# Patient Record
Sex: FEMALE | Race: WHITE | NOT HISPANIC OR LATINO | Employment: UNEMPLOYED | URBAN - METROPOLITAN AREA
[De-identification: names, ages, dates, MRNs, and addresses within clinical notes are randomized per-mention and may not be internally consistent; named-entity substitution may affect disease eponyms.]

---

## 2024-05-22 ENCOUNTER — APPOINTMENT (OUTPATIENT)
Dept: GENERAL RADIOLOGY | Facility: HOSPITAL | Age: 54
End: 2024-05-22
Payer: COMMERCIAL

## 2024-05-22 ENCOUNTER — APPOINTMENT (OUTPATIENT)
Dept: CT IMAGING | Facility: HOSPITAL | Age: 54
End: 2024-05-22
Payer: COMMERCIAL

## 2024-05-22 ENCOUNTER — HOSPITAL ENCOUNTER (INPATIENT)
Facility: HOSPITAL | Age: 54
LOS: 2 days | Discharge: HOME | End: 2024-05-24
Attending: STUDENT IN AN ORGANIZED HEALTH CARE EDUCATION/TRAINING PROGRAM | Admitting: INTERNAL MEDICINE
Payer: COMMERCIAL

## 2024-05-22 ENCOUNTER — APPOINTMENT (OUTPATIENT)
Dept: MRI IMAGING | Facility: HOSPITAL | Age: 54
End: 2024-05-22
Payer: COMMERCIAL

## 2024-05-22 ENCOUNTER — APPOINTMENT (OUTPATIENT)
Dept: CARDIOLOGY | Facility: HOSPITAL | Age: 54
End: 2024-05-22
Payer: COMMERCIAL

## 2024-05-22 DIAGNOSIS — R29.90 STROKE-LIKE SYMPTOMS: Primary | ICD-10-CM

## 2024-05-22 PROBLEM — G45.9 TIA (TRANSIENT ISCHEMIC ATTACK): Status: RESOLVED | Noted: 2024-05-22 | Resolved: 2024-05-22

## 2024-05-22 PROBLEM — I63.9 ACUTE CVA (CEREBROVASCULAR ACCIDENT): Status: ACTIVE | Noted: 2024-05-22

## 2024-05-22 PROBLEM — G45.9 TIA (TRANSIENT ISCHEMIC ATTACK): Status: ACTIVE | Noted: 2024-05-22

## 2024-05-22 PROBLEM — I63.9 CVA (CEREBRAL VASCULAR ACCIDENT): Status: ACTIVE | Noted: 2024-05-22

## 2024-05-22 LAB
ABO GROUP BLD: NORMAL
ALBUMIN SERPL-MCNC: 4.8 G/DL (ref 3.5–5.2)
ALBUMIN/GLOB SERPL: 1.5 G/DL
ALP SERPL-CCNC: 97 U/L (ref 39–117)
ALT SERPL W P-5'-P-CCNC: 27 U/L (ref 1–33)
ANION GAP SERPL CALCULATED.3IONS-SCNC: 13.1 MMOL/L (ref 5–15)
AORTIC DIMENSIONLESS INDEX: 0.8 (DI)
APTT PPP: 31.2 SECONDS (ref 22.7–35.4)
ASCENDING AORTA: 2.7 CM
AST SERPL-CCNC: 22 U/L (ref 1–32)
BASOPHILS # BLD AUTO: 0.04 10*3/MM3 (ref 0–0.2)
BASOPHILS NFR BLD AUTO: 0.6 % (ref 0–1.5)
BH CV ECHO MEAS - ACS: 2.08 CM
BH CV ECHO MEAS - AO MAX PG: 10 MMHG
BH CV ECHO MEAS - AO MEAN PG: 4.5 MMHG
BH CV ECHO MEAS - AO ROOT DIAM: 3.2 CM
BH CV ECHO MEAS - AO V2 MAX: 158 CM/SEC
BH CV ECHO MEAS - AO V2 VTI: 27.4 CM
BH CV ECHO MEAS - AVA(I,D): 2 CM2
BH CV ECHO MEAS - EDV(CUBED): 108.8 ML
BH CV ECHO MEAS - EDV(MOD-SP2): 58 ML
BH CV ECHO MEAS - EDV(MOD-SP4): 57 ML
BH CV ECHO MEAS - EF(MOD-BP): 67.7 %
BH CV ECHO MEAS - EF(MOD-SP2): 62.1 %
BH CV ECHO MEAS - EF(MOD-SP4): 70.2 %
BH CV ECHO MEAS - ESV(CUBED): 26 ML
BH CV ECHO MEAS - ESV(MOD-SP2): 22 ML
BH CV ECHO MEAS - ESV(MOD-SP4): 17 ML
BH CV ECHO MEAS - FS: 38 %
BH CV ECHO MEAS - IVS/LVPW: 1.07 CM
BH CV ECHO MEAS - IVSD: 0.88 CM
BH CV ECHO MEAS - LAT PEAK E' VEL: 8.3 CM/SEC
BH CV ECHO MEAS - LV MASS(C)D: 136.9 GRAMS
BH CV ECHO MEAS - LV MAX PG: 6.9 MMHG
BH CV ECHO MEAS - LV MEAN PG: 3.4 MMHG
BH CV ECHO MEAS - LV V1 MAX: 131.2 CM/SEC
BH CV ECHO MEAS - LV V1 VTI: 22.8 CM
BH CV ECHO MEAS - LVIDD: 4.8 CM
BH CV ECHO MEAS - LVIDS: 3 CM
BH CV ECHO MEAS - LVOT AREA: 2.4 CM2
BH CV ECHO MEAS - LVOT DIAM: 1.75 CM
BH CV ECHO MEAS - LVPWD: 0.83 CM
BH CV ECHO MEAS - MED PEAK E' VEL: 7.6 CM/SEC
BH CV ECHO MEAS - MV A DUR: 0.1 SEC
BH CV ECHO MEAS - MV A MAX VEL: 97.3 CM/SEC
BH CV ECHO MEAS - MV DEC SLOPE: 322.2 CM/SEC2
BH CV ECHO MEAS - MV DEC TIME: 0.3 SEC
BH CV ECHO MEAS - MV E MAX VEL: 82.3 CM/SEC
BH CV ECHO MEAS - MV E/A: 0.85
BH CV ECHO MEAS - MV MAX PG: 4.4 MMHG
BH CV ECHO MEAS - MV MEAN PG: 1.91 MMHG
BH CV ECHO MEAS - MV P1/2T: 89.9 MSEC
BH CV ECHO MEAS - MV V2 VTI: 27.8 CM
BH CV ECHO MEAS - MVA(P1/2T): 2.45 CM2
BH CV ECHO MEAS - MVA(VTI): 1.97 CM2
BH CV ECHO MEAS - PA V2 MAX: 95 CM/SEC
BH CV ECHO MEAS - PULM A REVS DUR: 0.13 SEC
BH CV ECHO MEAS - PULM A REVS VEL: 29.4 CM/SEC
BH CV ECHO MEAS - PULM DIAS VEL: 41.8 CM/SEC
BH CV ECHO MEAS - PULM S/D: 1
BH CV ECHO MEAS - PULM SYS VEL: 41.8 CM/SEC
BH CV ECHO MEAS - RAP SYSTOLE: 3 MMHG
BH CV ECHO MEAS - RVSP: 17 MMHG
BH CV ECHO MEAS - SV(LVOT): 54.7 ML
BH CV ECHO MEAS - SV(MOD-SP2): 36 ML
BH CV ECHO MEAS - SV(MOD-SP4): 40 ML
BH CV ECHO MEAS - TAPSE (>1.6): 2.7 CM
BH CV ECHO MEAS - TR MAX PG: 14.2 MMHG
BH CV ECHO MEAS - TR MAX VEL: 188.7 CM/SEC
BH CV ECHO MEASUREMENTS AVERAGE E/E' RATIO: 10.35
BH CV XLRA - RV BASE: 2.5 CM
BH CV XLRA - RV LENGTH: 7.1 CM
BH CV XLRA - RV MID: 2.6 CM
BH CV XLRA - TDI S': 13.8 CM/SEC
BILIRUB SERPL-MCNC: 0.2 MG/DL (ref 0–1.2)
BLD GP AB SCN SERPL QL: NEGATIVE
BUN SERPL-MCNC: 19 MG/DL (ref 6–20)
BUN/CREAT SERPL: 21.3 (ref 7–25)
CALCIUM SPEC-SCNC: 9.5 MG/DL (ref 8.6–10.5)
CHLORIDE SERPL-SCNC: 103 MMOL/L (ref 98–107)
CHOLEST SERPL-MCNC: 250 MG/DL (ref 0–200)
CO2 SERPL-SCNC: 22.9 MMOL/L (ref 22–29)
CREAT SERPL-MCNC: 0.89 MG/DL (ref 0.57–1)
DEPRECATED RDW RBC AUTO: 40.4 FL (ref 37–54)
EGFRCR SERPLBLD CKD-EPI 2021: 77.6 ML/MIN/1.73
EOSINOPHIL # BLD AUTO: 0.1 10*3/MM3 (ref 0–0.4)
EOSINOPHIL NFR BLD AUTO: 1.5 % (ref 0.3–6.2)
ERYTHROCYTE [DISTWIDTH] IN BLOOD BY AUTOMATED COUNT: 13.2 % (ref 12.3–15.4)
FOLATE SERPL-MCNC: 7.63 NG/ML (ref 4.78–24.2)
GLOBULIN UR ELPH-MCNC: 3.2 GM/DL
GLUCOSE SERPL-MCNC: 101 MG/DL (ref 65–99)
HBA1C MFR BLD: 5.8 % (ref 4.8–5.6)
HCT VFR BLD AUTO: 40.5 % (ref 34–46.6)
HDLC SERPL-MCNC: 60 MG/DL (ref 40–60)
HGB BLD-MCNC: 13.5 G/DL (ref 12–15.9)
HOLD SPECIMEN: NORMAL
HOLD SPECIMEN: NORMAL
IMM GRANULOCYTES # BLD AUTO: 0.02 10*3/MM3 (ref 0–0.05)
IMM GRANULOCYTES NFR BLD AUTO: 0.3 % (ref 0–0.5)
INR PPP: 0.88 (ref 0.9–1.1)
LDLC SERPL CALC-MCNC: 177 MG/DL (ref 0–100)
LDLC/HDLC SERPL: 2.9 {RATIO}
LEFT ATRIUM VOLUME INDEX: 9.2 ML/M2
LYMPHOCYTES # BLD AUTO: 1.79 10*3/MM3 (ref 0.7–3.1)
LYMPHOCYTES NFR BLD AUTO: 26.3 % (ref 19.6–45.3)
MCH RBC QN AUTO: 27.8 PG (ref 26.6–33)
MCHC RBC AUTO-ENTMCNC: 33.3 G/DL (ref 31.5–35.7)
MCV RBC AUTO: 83.5 FL (ref 79–97)
MONOCYTES # BLD AUTO: 0.55 10*3/MM3 (ref 0.1–0.9)
MONOCYTES NFR BLD AUTO: 8.1 % (ref 5–12)
NEUTROPHILS NFR BLD AUTO: 4.3 10*3/MM3 (ref 1.7–7)
NEUTROPHILS NFR BLD AUTO: 63.2 % (ref 42.7–76)
NRBC BLD AUTO-RTO: 0 /100 WBC (ref 0–0.2)
PLATELET # BLD AUTO: 300 10*3/MM3 (ref 140–450)
PMV BLD AUTO: 9.2 FL (ref 6–12)
POTASSIUM SERPL-SCNC: 4.2 MMOL/L (ref 3.5–5.2)
PROT SERPL-MCNC: 8 G/DL (ref 6–8.5)
PROTHROMBIN TIME: 12.2 SECONDS (ref 11.7–14.2)
QT INTERVAL: 388 MS
QTC INTERVAL: 419 MS
RBC # BLD AUTO: 4.85 10*6/MM3 (ref 3.77–5.28)
RH BLD: POSITIVE
SINUS: 3 CM
SODIUM SERPL-SCNC: 139 MMOL/L (ref 136–145)
STJ: 2.7 CM
T&S EXPIRATION DATE: NORMAL
TRIGL SERPL-MCNC: 79 MG/DL (ref 0–150)
TROPONIN T SERPL HS-MCNC: <6 NG/L
TSH SERPL DL<=0.05 MIU/L-ACNC: 4.1 UIU/ML (ref 0.27–4.2)
VIT B12 BLD-MCNC: 445 PG/ML (ref 211–946)
VLDLC SERPL-MCNC: 13 MG/DL (ref 5–40)
WBC NRBC COR # BLD AUTO: 6.8 10*3/MM3 (ref 3.4–10.8)
WHOLE BLOOD HOLD COAG: NORMAL
WHOLE BLOOD HOLD SPECIMEN: NORMAL

## 2024-05-22 PROCEDURE — 99222 1ST HOSP IP/OBS MODERATE 55: CPT

## 2024-05-22 PROCEDURE — 71045 X-RAY EXAM CHEST 1 VIEW: CPT

## 2024-05-22 PROCEDURE — 70553 MRI BRAIN STEM W/O & W/DYE: CPT

## 2024-05-22 PROCEDURE — 93306 TTE W/DOPPLER COMPLETE: CPT | Performed by: INTERNAL MEDICINE

## 2024-05-22 PROCEDURE — 85730 THROMBOPLASTIN TIME PARTIAL: CPT | Performed by: STUDENT IN AN ORGANIZED HEALTH CARE EDUCATION/TRAINING PROGRAM

## 2024-05-22 PROCEDURE — 25010000002 MIDAZOLAM PER 1 MG: Performed by: STUDENT IN AN ORGANIZED HEALTH CARE EDUCATION/TRAINING PROGRAM

## 2024-05-22 PROCEDURE — 83036 HEMOGLOBIN GLYCOSYLATED A1C: CPT | Performed by: EMERGENCY MEDICINE

## 2024-05-22 PROCEDURE — 0 GADOBENATE DIMEGLUMINE 529 MG/ML SOLUTION: Performed by: EMERGENCY MEDICINE

## 2024-05-22 PROCEDURE — 25510000001 IOPAMIDOL PER 1 ML: Performed by: EMERGENCY MEDICINE

## 2024-05-22 PROCEDURE — A9577 INJ MULTIHANCE: HCPCS | Performed by: EMERGENCY MEDICINE

## 2024-05-22 PROCEDURE — 93306 TTE W/DOPPLER COMPLETE: CPT

## 2024-05-22 PROCEDURE — 85610 PROTHROMBIN TIME: CPT | Performed by: STUDENT IN AN ORGANIZED HEALTH CARE EDUCATION/TRAINING PROGRAM

## 2024-05-22 PROCEDURE — G0378 HOSPITAL OBSERVATION PER HR: HCPCS

## 2024-05-22 PROCEDURE — 70496 CT ANGIOGRAPHY HEAD: CPT

## 2024-05-22 PROCEDURE — 99291 CRITICAL CARE FIRST HOUR: CPT

## 2024-05-22 PROCEDURE — 96374 THER/PROPH/DIAG INJ IV PUSH: CPT

## 2024-05-22 PROCEDURE — 86900 BLOOD TYPING SEROLOGIC ABO: CPT | Performed by: STUDENT IN AN ORGANIZED HEALTH CARE EDUCATION/TRAINING PROGRAM

## 2024-05-22 PROCEDURE — 84484 ASSAY OF TROPONIN QUANT: CPT | Performed by: STUDENT IN AN ORGANIZED HEALTH CARE EDUCATION/TRAINING PROGRAM

## 2024-05-22 PROCEDURE — 80061 LIPID PANEL: CPT | Performed by: EMERGENCY MEDICINE

## 2024-05-22 PROCEDURE — 72156 MRI NECK SPINE W/O & W/DYE: CPT

## 2024-05-22 PROCEDURE — 80050 GENERAL HEALTH PANEL: CPT | Performed by: STUDENT IN AN ORGANIZED HEALTH CARE EDUCATION/TRAINING PROGRAM

## 2024-05-22 PROCEDURE — 86850 RBC ANTIBODY SCREEN: CPT | Performed by: STUDENT IN AN ORGANIZED HEALTH CARE EDUCATION/TRAINING PROGRAM

## 2024-05-22 PROCEDURE — 70498 CT ANGIOGRAPHY NECK: CPT

## 2024-05-22 PROCEDURE — 93010 ELECTROCARDIOGRAM REPORT: CPT | Performed by: INTERNAL MEDICINE

## 2024-05-22 PROCEDURE — 93005 ELECTROCARDIOGRAM TRACING: CPT | Performed by: STUDENT IN AN ORGANIZED HEALTH CARE EDUCATION/TRAINING PROGRAM

## 2024-05-22 PROCEDURE — 70450 CT HEAD/BRAIN W/O DYE: CPT

## 2024-05-22 PROCEDURE — 82746 ASSAY OF FOLIC ACID SERUM: CPT

## 2024-05-22 PROCEDURE — 82607 VITAMIN B-12: CPT

## 2024-05-22 PROCEDURE — 86901 BLOOD TYPING SEROLOGIC RH(D): CPT | Performed by: STUDENT IN AN ORGANIZED HEALTH CARE EDUCATION/TRAINING PROGRAM

## 2024-05-22 RX ORDER — ASPIRIN 300 MG/1
300 SUPPOSITORY RECTAL DAILY
Status: DISCONTINUED | OUTPATIENT
Start: 2024-05-22 | End: 2024-05-24

## 2024-05-22 RX ORDER — SODIUM CHLORIDE 0.9 % (FLUSH) 0.9 %
10 SYRINGE (ML) INJECTION AS NEEDED
Status: DISCONTINUED | OUTPATIENT
Start: 2024-05-22 | End: 2024-05-24 | Stop reason: HOSPADM

## 2024-05-22 RX ORDER — ASPIRIN 325 MG
325 TABLET ORAL DAILY
Status: DISCONTINUED | OUTPATIENT
Start: 2024-05-22 | End: 2024-05-24

## 2024-05-22 RX ORDER — MIDAZOLAM HYDROCHLORIDE 1 MG/ML
1 INJECTION INTRAMUSCULAR; INTRAVENOUS EVERY 4 HOURS PRN
Status: DISCONTINUED | OUTPATIENT
Start: 2024-05-22 | End: 2024-05-24 | Stop reason: HOSPADM

## 2024-05-22 RX ORDER — SODIUM CHLORIDE 9 MG/ML
40 INJECTION, SOLUTION INTRAVENOUS AS NEEDED
Status: DISCONTINUED | OUTPATIENT
Start: 2024-05-22 | End: 2024-05-24 | Stop reason: HOSPADM

## 2024-05-22 RX ORDER — ATORVASTATIN CALCIUM 80 MG/1
80 TABLET, FILM COATED ORAL NIGHTLY
Status: DISCONTINUED | OUTPATIENT
Start: 2024-05-22 | End: 2024-05-24 | Stop reason: HOSPADM

## 2024-05-22 RX ORDER — ONDANSETRON 2 MG/ML
4 INJECTION INTRAMUSCULAR; INTRAVENOUS EVERY 6 HOURS PRN
Status: DISCONTINUED | OUTPATIENT
Start: 2024-05-22 | End: 2024-05-24 | Stop reason: HOSPADM

## 2024-05-22 RX ORDER — SODIUM CHLORIDE 0.9 % (FLUSH) 0.9 %
10 SYRINGE (ML) INJECTION EVERY 12 HOURS SCHEDULED
Status: DISCONTINUED | OUTPATIENT
Start: 2024-05-22 | End: 2024-05-24 | Stop reason: HOSPADM

## 2024-05-22 RX ORDER — ATORVASTATIN CALCIUM 20 MG/1
40 TABLET, FILM COATED ORAL NIGHTLY
Status: DISCONTINUED | OUTPATIENT
Start: 2024-05-22 | End: 2024-05-22

## 2024-05-22 RX ADMIN — IOPAMIDOL 95 ML: 755 INJECTION, SOLUTION INTRAVENOUS at 16:45

## 2024-05-22 RX ADMIN — MIDAZOLAM 1 MG: 1 INJECTION INTRAMUSCULAR; INTRAVENOUS at 10:18

## 2024-05-22 RX ADMIN — Medication 10 ML: at 16:51

## 2024-05-22 RX ADMIN — ASPIRIN 325 MG: 325 TABLET ORAL at 16:50

## 2024-05-22 RX ADMIN — Medication 10 ML: at 20:40

## 2024-05-22 RX ADMIN — ATORVASTATIN CALCIUM 80 MG: 80 TABLET, FILM COATED ORAL at 20:40

## 2024-05-22 RX ADMIN — GADOBENATE DIMEGLUMINE 20 ML: 529 INJECTION, SOLUTION INTRAVENOUS at 11:36

## 2024-05-22 NOTE — CONSULTS
Neurology Consult Note    Consult Date: 5/22/2024    Referring MD: Lex Garcia MD    Reason for Consult I have been asked to see the patient in neurological consultation to render advice and opinion regarding right hand and right foot weakness.    Linda Castano is a 53 y.o. female with no significant PMH and family history of father with stroke presents to the ED with right hand and right foot weakness since 6 am this morning. Patient states she was up washing her hands and she tried to step with her right foot towards the shower and said if felt heavy. She also noticed after showering that when she brushed her hair her right hand also felt heavy. Patient states that symptoms are gradually improving more so in her foot than her hand. She denies issues walking or imbalance. She did not appreciate any more proximal weakness. She denies numbness/tingling, speech or visual deficit. She denies dizziness. Patient admits to neck pain and states she has been told she has degenerative changes in her neck before. She also states she has intermittent numbness and tingling in both hands in more noticeable in her first 3 fingers. She does not have any shooting pain or numbness when turning head, coughing, sneezing or bearing down. She does not complain of urinary/bowel incontinence or saddle anesthesia. She denies recent head trauma or illness, or previous history of stroke or heart attack, or heart arrhythmia. BP on arrival was 175/93 mmHg and pulse was 78. Blood glucose was 101. LDL is 177. She is on no medications on arrival and states she has not seen a PCP since 2020.    Past Medical/Surgical Hx:  History reviewed. No pertinent past medical history.  History reviewed. No pertinent surgical history.    Medications On Admission  (Not in a hospital admission)      Allergies:  Allergies   Allergen Reactions    Sulfa Antibiotics Hives       Social Hx:  Social History     Socioeconomic History    Marital status:   "  Tobacco Use    Smoking status: Never    Smokeless tobacco: Never   Vaping Use    Vaping status: Never Used   Substance and Sexual Activity    Alcohol use: Yes     Comment: occasionally, 1x a month    Drug use: Not Currently    Sexual activity: Defer       Family Hx:  Family History   Problem Relation Age of Onset    Hypertension Mother     Stroke Father     Hypertension Father     Hyperlipidemia Father        Exam    BP (!) 157/111   Pulse 66   Temp 97.4 °F (36.3 °C)   Resp 18   Ht 157.5 cm (62\")   Wt 99.2 kg (218 lb 9.6 oz)   SpO2 99%   BMI 39.98 kg/m²   gen: NAD, vitals reviewed  MS: oriented x3, recent/remote memory intact, normal attention/concentration, language intact, no neglect, normal fund of knowledge  CN: visual acuity grossly normal, visual fields full, PERRL, EOMI, facial sensation equal, no facial droop, hearing symmetric, palate elevates symmetrically, shoulder shrug equal, tongue midline  Motor: 5/5 throughout upper and lower extremities, subjective weakness in right hand and foot, normal tone  Sensation: intact to cold temperature and vibration throughout, negative tinel's, positive phalen's on the right   Reflexes: 2+ bilateral brachioradialis, biceps, 0 triceps, 1+ patellar, 1+ ankle, plantars flat in right and downgoing in left, child's slightly positive, no clonus  Coordination: no dysmetria with finger to nose bilaterally  Gait: no ataxia, normal station    DATA:    Lab Results   Component Value Date    GLUCOSE 101 (H) 05/22/2024    CALCIUM 9.5 05/22/2024     05/22/2024    K 4.2 05/22/2024    CO2 22.9 05/22/2024     05/22/2024    BUN 19 05/22/2024    CREATININE 0.89 05/22/2024    EGFRIFAFRI >60 12/15/2020    BCR 21.3 05/22/2024    ANIONGAP 13.1 05/22/2024     Lab Results   Component Value Date    WBC 6.80 05/22/2024    HGB 13.5 05/22/2024    HCT 40.5 05/22/2024    MCV 83.5 05/22/2024     05/22/2024     Lab Results   Component Value Date     (H) 05/22/2024 " "    No results found for: \"HGBA1C\"  Lab Results   Component Value Date    INR 0.88 (L) 05/22/2024    INR 1.0 02/08/2015    PROTIME 12.2 05/22/2024    PROTIME 11.4 02/08/2015       Lab review:   CMP: Glucose 101  Lipid panel: Total cholesterol 250, LDL: 177  CBC with diff: WNL    Imaging review:   CT head without contrast:  FINDINGS: There is a discrete ovoid area of low density extending from  the anterior body of the right caudate nucleus into the anterior limb of  the right internal capsule and measures 15 x 6 x 9 mm in anterior  posterior, medial lateral and craniocaudal dimension and is consistent  with a subacute or chronic lacunar infarct. The remainder of the brain  parenchyma is normal in attenuation. The ventricles are normal in size.  I see no focal mass effect. There is no midline shift. No extra-axial  fluid collections are identified. There is no evidence of acute  intracranial hemorrhage. The calvarium and skull base are normal in  appearance. The paranasal sinuses and the mastoid air cells and middle  ear cavities are clear. The orbits are normal in appearance.   IMPRESSION:  1. There is a discrete 15 x 6 x 9 mm ovoid area of low density extending  from the anterior body of the right caudate nucleus into the anterior  limb of the right internal capsule that most probably represents either  a late subacute or old lacunar infarct, I think it is unlikely to be  demyelinating in origin. The remainder of the head CT is within normal  limits. The etiology of the patient's acute onset right-sided weakness  and dizziness is not established on this exam. The results were  communicated to CLARISSE Hewitt, the Stroke Neurologist, while the patient  was on our scanner on 05/22/2024 at 9:15 a.m. He informed me that the  patient is going to get an MRI of the brain and will get a follow-up CT  angiogram of the head and neck. I also communicated the results to Dr. Garcia from the ER by telephone on 05/22/2024 at 9:30 " a.m.    CXR:  No focal consolidation. No pleural effusion or pneumothorax.   Normal size cardiomediastinal silhouette.  No focal osseous  abnormality.      EKG:  Sinus rhythm  Probable left atrial enlargement  Low voltage, precordial leads    Diagnoses:  Right hand and right foot weakness: CT head demonstrates a discrete ovoid area in right caudate nucleus extending in to the right internal capsule most likely a chronic to subacute infarct but would not account for patient's clinical symptoms. Demyelinating process felt to be unlikely. However, Brain with and without contrast ordered as well as cervical MRI with and without.   Intermittent numbness and tingling in bilateral hands more notable in first 3 fingers with positive phalen on right  Chronic neck pain: with no recent head or neck injury, no red flag signs  Elevated LDL: 177    NIHSS: 0  Pre-stroke mRS: 0    PLAN:   Brain MRI with and without  Cervical MRI with and without  TTE  Pt was not a candidate for tPA given due to mild non-disabling symptoms. Discussed with patient and she is in agreement.   Give aspirin now; if cannot swallow give IA  Lipitor 80 mg  Maintain permissive HTN for 24-48hrs, do not treat unless BP > 220/120 if no contraindication  Perform bedside swallow test  Telemetry to monitor for arrhythmia  VTE prophylaxis  Neuro checks, if change in exam call neuro oncall  PT/OT when appropriate   TSH, B12, folate    Recommendations discussed with Dr. Hewitt and he is in agreement with plan.

## 2024-05-22 NOTE — PLAN OF CARE
"Goal Outcome Evaluation:     Patient is alert and oriented x's 4, still has some residual decreased sensation and \"heaviness\" in the right foot and hand. After admission to obs unit, she was found to have an acute infract on MRI and will be changed to an inpatient admission. These are the only symptoms noted.  at bedside, awaiting bed and inpatient consult. Head CTA performed and pending.                                          "

## 2024-05-22 NOTE — ED PROVIDER NOTES
EMERGENCY DEPARTMENT ENCOUNTER  Room Number:  03/03  PCP: Provider, No Known  Independent Historians: Patient and Family      HPI:  Chief Complaint: had concerns including Extremity Weakness.     Context: Linda Castano is a 53 y.o. female with a medical history of cervical radiculopathy who presents to the ED c/o acute extremity weakness.  Patient states around 6:00 this morning she began noticing heaviness and weakness of her right foot.  Patient additionally tried to brush her hair and noticed her right hand did not seem to be working the way she expected.  Patient additionally reports some tingling in the right upper extremity.  Symptoms have gradually improved with only minimal weakness in the right lower extremity and minimal tingling sensation in the right upper extremity.  Patient with no previous CVA.  Patient has been diagnosed with cervical radiculopathy in the past.    Review of prior external notes (non-ED) -and- Review of prior external test results outside of this encounter: Extensive review of the EPIC system as well as [facility] reveals no prior visit notes and no prior diagnostic studies available for review.    PAST MEDICAL HISTORY  Active Ambulatory Problems     Diagnosis Date Noted    No Active Ambulatory Problems     Resolved Ambulatory Problems     Diagnosis Date Noted    No Resolved Ambulatory Problems     No Additional Past Medical History         PAST SURGICAL HISTORY  History reviewed. No pertinent surgical history.      FAMILY HISTORY  Family History   Problem Relation Age of Onset    Hypertension Mother     Stroke Father     Hypertension Father     Hyperlipidemia Father          SOCIAL HISTORY  Social History     Socioeconomic History    Marital status:    Tobacco Use    Smoking status: Never    Smokeless tobacco: Never   Vaping Use    Vaping status: Never Used   Substance and Sexual Activity    Alcohol use: Yes     Comment: occasionally, 1x a month    Drug use: Not  Currently    Sexual activity: Defer         ALLERGIES  Sulfa antibiotics      REVIEW OF SYSTEMS  Review of Systems  Included in HPI  All systems reviewed and negative except for those discussed in HPI.      PHYSICAL EXAM    I have reviewed the triage vital signs and nursing notes.    ED Triage Vitals [05/22/24 0800]   Temp Heart Rate Resp BP SpO2   97.4 °F (36.3 °C) 78 18 -- 97 %      Temp src Heart Rate Source Patient Position BP Location FiO2 (%)   -- -- -- -- --       Physical Exam  GENERAL: alert, no acute distress  SKIN: Warm, dry  HENT: Normocephalic, atraumatic  EYES: no scleral icterus  CV: regular rhythm, regular rate  RESPIRATORY: normal effort, lungs clear  ABDOMEN: soft, nontender, nondistended  MUSCULOSKELETAL: no deformity  NEURO: alert, moves all extremities, follows commands.  Mild weakness of the right lower extremity.  Mild sensory deficit of the right upper extremity.  No gross motor deficits.  No aphasia, no facial droop, no ataxia            LAB RESULTS  Recent Results (from the past 24 hour(s))   Green Top (Gel)    Collection Time: 05/22/24  9:05 AM   Result Value Ref Range    Extra Tube Hold for add-ons.    Lavender Top    Collection Time: 05/22/24  9:05 AM   Result Value Ref Range    Extra Tube hold for add-on    Gold Top - SST    Collection Time: 05/22/24  9:05 AM   Result Value Ref Range    Extra Tube Hold for add-ons.    Light Blue Top    Collection Time: 05/22/24  9:05 AM   Result Value Ref Range    Extra Tube Hold for add-ons.    aPTT    Collection Time: 05/22/24  9:05 AM    Specimen: Blood   Result Value Ref Range    PTT 31.2 22.7 - 35.4 seconds   Protime-INR    Collection Time: 05/22/24  9:05 AM    Specimen: Blood   Result Value Ref Range    Protime 12.2 11.7 - 14.2 Seconds    INR 0.88 (L) 0.90 - 1.10   CBC Auto Differential    Collection Time: 05/22/24  9:05 AM    Specimen: Blood   Result Value Ref Range    WBC 6.80 3.40 - 10.80 10*3/mm3    RBC 4.85 3.77 - 5.28 10*6/mm3    Hemoglobin  13.5 12.0 - 15.9 g/dL    Hematocrit 40.5 34.0 - 46.6 %    MCV 83.5 79.0 - 97.0 fL    MCH 27.8 26.6 - 33.0 pg    MCHC 33.3 31.5 - 35.7 g/dL    RDW 13.2 12.3 - 15.4 %    RDW-SD 40.4 37.0 - 54.0 fl    MPV 9.2 6.0 - 12.0 fL    Platelets 300 140 - 450 10*3/mm3    Neutrophil % 63.2 42.7 - 76.0 %    Lymphocyte % 26.3 19.6 - 45.3 %    Monocyte % 8.1 5.0 - 12.0 %    Eosinophil % 1.5 0.3 - 6.2 %    Basophil % 0.6 0.0 - 1.5 %    Immature Grans % 0.3 0.0 - 0.5 %    Neutrophils, Absolute 4.30 1.70 - 7.00 10*3/mm3    Lymphocytes, Absolute 1.79 0.70 - 3.10 10*3/mm3    Monocytes, Absolute 0.55 0.10 - 0.90 10*3/mm3    Eosinophils, Absolute 0.10 0.00 - 0.40 10*3/mm3    Basophils, Absolute 0.04 0.00 - 0.20 10*3/mm3    Immature Grans, Absolute 0.02 0.00 - 0.05 10*3/mm3    nRBC 0.0 0.0 - 0.2 /100 WBC   Type & Screen    Collection Time: 05/22/24  9:06 AM    Specimen: Blood   Result Value Ref Range    ABO Type O     RH type Positive     Antibody Screen Negative     T&S Expiration Date 5/25/2024 11:59:59 PM    ECG 12 Lead Stroke Evaluation    Collection Time: 05/22/24  9:20 AM   Result Value Ref Range    QT Interval 388 ms    QTC Interval 419 ms         RADIOLOGY  CT Head Without Contrast Stroke Protocol    Result Date: 5/22/2024  EMERGENCY CT SCAN OF THE HEAD WITHOUT CONTRAST ON 05/22/2024  CLINICAL HISTORY: Acute stroke suspected, patient has right-sided weakness and dizziness.  TECHNIQUE: Spiral CT images were obtained from the base of the skull to the vertex without intravenous contrast. The images were reformatted and are submitted in 3 mm thick axial, sagittal and coronal CT sections with brain algorithm.  COMPARISON: There are no prior CTs for comparison.  FINDINGS: There is a discrete ovoid area of low density extending from the anterior body of the right caudate nucleus into the anterior limb of the right internal capsule and measures 15 x 6 x 9 mm in anterior posterior, medial lateral and craniocaudal dimension and is consistent  with a subacute or chronic lacunar infarct. The remainder of the brain parenchyma is normal in attenuation. The ventricles are normal in size. I see no focal mass effect. There is no midline shift. No extra-axial fluid collections are identified. There is no evidence of acute intracranial hemorrhage. The calvarium and skull base are normal in appearance. The paranasal sinuses and the mastoid air cells and middle ear cavities are clear. The orbits are normal in appearance.      1. There is a discrete 15 x 6 x 9 mm ovoid area of low density extending from the anterior body of the right caudate nucleus into the anterior limb of the right internal capsule that most probably represents either a late subacute or old lacunar infarct, I think it is unlikely to be demyelinating in origin. The remainder of the head CT is within normal limits. The etiology of the patient's acute onset right-sided weakness and dizziness is not established on this exam. The results were communicated to CLARISSE Hewitt, the Stroke Neurologist, while the patient was on our scanner on 05/22/2024 at 9:15 a.m. He informed me that the patient is going to get an MRI of the brain and will get a follow-up CT angiogram of the head and neck. I also communicated the results to Dr. Garcia from the ER by telephone on 05/22/2024 at 9:30 a.m.  Radiation dose reduction techniques were utilized, including automated exposure control and exposure modulation based on body size.          MEDICATIONS GIVEN IN ER  Medications   sodium chloride 0.9 % flush 10 mL (has no administration in time range)   midazolam (VERSED) injection 1 mg (1 mg Intravenous Given 5/22/24 1018)   sodium chloride 0.9 % flush 10 mL (has no administration in time range)   sodium chloride 0.9 % flush 10 mL (has no administration in time range)   sodium chloride 0.9 % infusion 40 mL (has no administration in time range)   atorvastatin (LIPITOR) tablet 40 mg (has no administration in time range)    ondansetron (ZOFRAN) injection 4 mg (has no administration in time range)   aspirin tablet 325 mg (has no administration in time range)     Or   aspirin suppository 300 mg (has no administration in time range)         ORDERS PLACED DURING THIS VISIT:  Orders Placed This Encounter   Procedures    CT Head Without Contrast Stroke Protocol    XR Chest 1 View    MRI Brain With & Without Contrast    MRI Cervical Spine With & Without Contrast    [place] Draw    aPTT    Comprehensive Metabolic Panel    Protime-INR    Single High Sensitivity Troponin T    CBC Auto Differential    Hemoglobin A1c    Lipid Panel    aPTT    Protime-INR    CBC (No Diff)    Comprehensive Metabolic Panel    NPO Diet NPO Type: Strict NPO    Initiate Department's Acute Stroke Process (Team D, Code 19, etc.)    Perform NIH Stroke Scale    Measure Actual Weight    Notify Provider    Notify Provider for SBP Greater Than 140 for Hemorrhagic Stroke Patient    Head of Bed 30 Degrees or Less    Undress and Gown    Vital Signs    Neuro Checks    No Hypotonic Fluids    Nursing Dysphagia Screening (Complete Prior to Giving anything PO)    RN to Place Order SLP Consult (IF swallow screen failed) - Eval & Treat Choosing Reason of RN Dysphagia Screen Failed    Vital Signs    Continuous Pulse Oximetry    Telemetry - Place Orders & Notify Provider of Results When Patient Experiences Acute Chest Pain, Dysrhythmia or Respiratory Distress    Notify Provider    Nursing Dysphagia Screening (Complete Prior to Giving Anything By Mouth)    RN to Place Order SLP Consult - Eval & Treat Choosing Reason of RN Dysphagia Screen Failed    Notify Provider if Patient Fails Dysphagia Screening    Intake and Output    Neuro Checks    NIHSS Assessment    Order CT Head Without Contrast for Neurological Decline    Provide Stroke Education Material    Saline Lock & Maintain IV Access    Activity As Tolerated    Place Sequential Compression Device    Maintain Sequential Compression  Device    Code Status and Medical Interventions:    Inpatient Neurology Consult Stroke    Inpatient Neurology Consult Stroke    Inpatient Neurology Consult Stroke    OT Consult: Eval & Treat    PT Consult: Eval & Treat As Tolerated    Oxygen Therapy- Nasal Cannula; 2 LPM    POC Glucose Once    POC Glucose Q6H    ECG 12 Lead Stroke Evaluation    Adult Transthoracic Echo Complete W/ Cont if Necessary Per Protocol (With Agitated Saline)    Type & Screen    Insert Large-Bore Peripheral IV - RIGHT AC Preferred    Insert Peripheral IV    Initiate ED Observation Status    Fall Precautions    Green Top (Gel)    Lavender Top    Gold Top - SST    Light Blue Top    CBC & Differential         OUTPATIENT MEDICATION MANAGEMENT:  Current Facility-Administered Medications Ordered in Epic   Medication Dose Route Frequency Provider Last Rate Last Admin    aspirin tablet 325 mg  325 mg Oral Daily Jr Mason MD        Or    aspirin suppository 300 mg  300 mg Rectal Daily Jr Mason MD        atorvastatin (LIPITOR) tablet 40 mg  40 mg Oral Nightly Jr Mason MD        midazolam (VERSED) injection 1 mg  1 mg Intravenous Q4H PRN Lex Garcia MD   1 mg at 05/22/24 1018    ondansetron (ZOFRAN) injection 4 mg  4 mg Intravenous Q6H PRN Jr Mason MD        sodium chloride 0.9 % flush 10 mL  10 mL Intravenous PRN Lex Garcia MD        sodium chloride 0.9 % flush 10 mL  10 mL Intravenous Q12H Jr Mason MD        sodium chloride 0.9 % flush 10 mL  10 mL Intravenous PRN Jr Mason MD        sodium chloride 0.9 % infusion 40 mL  40 mL Intravenous PRN Jr Mason MD         No current [facility]-ordered outpatient medications on file.         PROCEDURES  Procedures      Critical care provider statement:    Critical care time (minutes): 33.   Critical care time was exclusive of:  Separately billable procedures and treating other patients   Critical care was necessary to treat or prevent imminent or  life-threatening deterioration of the following conditions:  CNS Failure   Critical care was time spent personally by me on the following activities:  Development of treatment plan with patient or surrogate, discussions with consultants, evaluation of patient's response to treatment, examination of patient, obtaining history from patient or surrogate, ordering and performing treatments and interventions, ordering and review of laboratory studies, ordering and review of radiographic studies, pulse oximetry, re-evaluation of patient's condition and review of old charts. Critical Care indicators: Stroke       PROGRESS, DATA ANALYSIS, CONSULTS, AND MEDICAL DECISION MAKING  All labs have been independently interpreted by me.  All radiology studies have been reviewed by me. All EKG's have been independently viewed and interpreted by me.  Discussion below represents my analysis of pertinent findings related to patient's condition, differential diagnosis, treatment plan and final disposition.    Differential diagnosis includes but is not limited to CVA, TIA, MS, electrolyte abnormalities.    Clinical Scores:            Total (NIH Stroke Scale): 1      ED Course as of 05/22/24 1022   Wed May 22, 2024   0906 Discussed with Dr. Rodney who recommends plain head CT and stat MRI brain.  He states no need for team D evaluation. [MW]   0906 Last known well of 0600, initial NIH of 1 [MW]   0927 CT head interpreted by me demonstrates no evidence of intracranial hemorrhage [MW]   0942 EKG interpreted by me demonstrates sinus rhythm, rate of 70, no MS/QT prolongation, no ST elevation [MW]   1018 Laboratory evaluation is overall unremarkable.  Radiological evaluation does demonstrate no evidence of acute intracranial abnormality however there is a small area of irregularity on the CT head that could correlate to a subacute versus chronic appearing infarct versus demyelinating process.  Will further characterize with MRI brain with and  without contrast.  Will proceed with admission to observation unit for further evaluation and management. [MW]   1019 Patient was not a TNK candidate due to nondisabling symptoms and unclear etiology of symptoms. [MW]   1019 Discussed with Jany LUIS with [facility] who agrees to admit [MW]      ED Course User Index  [MW] Lex Garcia MD             AS OF 10:22 EDT VITALS:    BP - (!) 157/111  HR - 66  TEMP - 97.4 °F (36.3 °C)  O2 SATS - 99%    COMPLEXITY OF CARE  The patient requires admission.      DIAGNOSIS  Final diagnoses:   Stroke-like symptoms         DISPOSITION  ED Disposition       ED Disposition   Decision to Admit    Condition   --    Comment   --                Please note that portions of this document were completed with a voice recognition program.    Note Disclaimer: At [facility], we believe that sharing information builds trust and better relationships. You are receiving this note because you recently visited [facility]. It is possible you will see health information before a provider has talked with you about it. This kind of information can be easy to misunderstand. To help you fully understand what it means for your health, we urge you to discuss this note with your provider.         Lex Garcia MD  05/22/24 1022

## 2024-05-22 NOTE — ED TRIAGE NOTES
Patient to ed from home via PV with complaint of right hand and right foot weakness that started around 6am. States some improvement in weakness

## 2024-05-22 NOTE — H&P
[facility]   HISTORY AND PHYSICAL    Patient Name: Linda Castano  : 1970  MRN: [id number]  Primary Care Physician:  Provider, No Known  Date of admission: 2024    Subjective   Subjective     Chief Complaint:   Chief Complaint   Patient presents with    Extremity Weakness         HPI:    Linda Castano is a 53 y.o. female with no significant past medical history who is being admitted for acute CVA.  Patient reports that at she woke up around 620 this morning and walked to the bathroom while in the bathroom she noticed that her right foot seemed heavier and like it was dropping and then she went to brush her hair and noticed that her right hand she did not have as much control and it felt heavier and she felt like this started at 625.  Patient denies any difficulty speaking, slurred speech, facial droop, chest pain, lightheadedness, headache, or dyspnea.  Patient denies ever having symptoms like this prior.  Patient states that her right hand and right foot feel mildly better but still not at baseline.  Patient denies any recent trauma.  Patient states that she was treated for hypertension in the past but she lost a bunch of weight and she did not need blood pressure medicine anymore and her primary care took her off of it, but states that that was about 10 years ago and she has not really followed up with anybody since.    Review of Systems   All systems were reviewed and negative except for: All pertinent positives are listed in the above HPI    Personal History     History reviewed. No pertinent past medical history.    History reviewed. No pertinent surgical history.    Family History: family history includes Hyperlipidemia in her father; Hypertension in her father and mother; Stroke in her father. Otherwise pertinent FHx was reviewed and not pertinent to current issue.    Social History:  reports that she has never smoked. She has never used smokeless tobacco. She reports current alcohol  use. She reports that she does not use drugs.    Home Medications:       Allergies:  Allergies   Allergen Reactions    Sulfa Antibiotics Hives       Objective   Objective     Vitals:   Temp:  [97.4 °F (36.3 °C)-98.1 °F (36.7 °C)] 98.1 °F (36.7 °C)  Heart Rate:  [66-80] 75  Resp:  [18] 18  BP: (140-175)/() 140/75  Physical Exam    Constitutional: Awake, alert, well-groomed healthy appearing female   Eyes: PERRL, sclerae anicteric, no conjunctival injection, EOMI   HENT: NCAT, mucous membranes moist, normal hearing   Neck: Supple, nontender, trachea midline   Respiratory: Clear to auscultation bilaterally, nonlabored respirations on room air   Cardiovascular: RRR, no murmurs, rubs, or gallops, palpable pedal pulses bilaterally   Gastrointestinal: Positive bowel sounds, soft, nontender, nondistended   Musculoskeletal: No bilateral ankle edema, no clubbing or cyanosis to extremities   Psychiatric: Appropriate affect, cooperative   Neurologic: Oriented x 3, strength symmetric in all extremities, Cranial Nerves grossly intact to confrontation, speech clear; NIH 2 for very mild drift of right hand and mild paresthesias of right arm   Skin: No rashes     Result Review    Result Review:  I have personally reviewed the results from the time of this admission to 5/22/2024 15:08 EDT and agree with these findings:  [x]  Laboratory list / accordion  []  Microbiology  [x]  Radiology  [x]  EKG/Telemetry   []  Cardiology/Vascular   []  Pathology  []  Old records  []  Other:  Most notable findings include: Potassium within normal limits, serum creatinine within normal limits, A1c 5.8, THC within normal limits, vitamin B12 and folate within normal limits, total cholesterol elevated at 250 and LDL elevated at 177, INR 0.88 and normal WBC.  CT head without showed a discrete 15 x 6 x 9 mm ovoid area of low density extending from the anterior body of the right caudate nucleus into the anterior limb of the right internal capsule that  most probably represents either a late subacute or old lacunar infarct; remainder of the head CT within normal limits.  MRI brain with and without obtained and shows a 15 x 8 x 8 mm old lacunar infarct extending from the body of the right caudate nucleus into the mid right corona radiata region and superior aspect of the genu of the right internal capsule, there is a triangular area of acute infarction measuring 10 x 11 mm in size extending from the superior left frontal parietal subcortical white matter into the posterior left corona radiata region.  MRI of the cervical spine shows mild cervical spondylosis, some disc space narrowing and degenerative endplate changes from C3-C7 and posterior spurs minimally narrows the canal at C3-4.  Posterior spurs indent the ventral thecal sac and abuts the ventral cord mildly narrowing the canal at C4-5 and posterior spurs minimally indents the ventral thecal sac resulting in minimal if any canal narrowing at C5-6 and C6-7, no cervical disc herniation is identified and no additional cervical canal narrowing is identified, there is some uncovertebral joint spurring into the neural foramen on resulting in minimal foraminal narrowing at C3-4 up to mild to moderate bilateral bony foraminal narrowing at C4-5 and mild right and mild to moderate left bony foraminal narrowing at C5-6.  EKG shows sinus rhythm without any acute ST changes.      Assessment & Plan   Assessment / Plan     Brief Patient Summary:  Linda Castano is a 53 y.o. female who is being admitted for acute CVA    Active Hospital Problems:  Active Hospital Problems    Diagnosis     **Acute CVA (cerebrovascular accident)      Plan:   Acute CVA:  -  CT head without showed a discrete 15 x 6 x 9 mm ovoid area of low density extending from the anterior body of the right caudate nucleus into the anterior limb of the right internal capsule that most probably represents either a late subacute or old lacunar infarct; remainder  of the head CT within normal limits.  -  MRI brain with and without obtained and shows a 15 x 8 x 8 mm old lacunar infarct extending from the body of the right caudate nucleus into the mid right corona radiata region and superior aspect of the genu of the right internal capsule, there is a triangular area of acute infarction measuring 10 x 11 mm in size extending from the superior left frontal parietal subcortical white matter into the posterior left corona radiata region.  -Aspirin and statin started  -Neurology saw and evaluated the patient in ER and recommends permissive hypertension for 24 to 48 hours, do not treat unless BP is greater than 220/120  -Continuous cardiac monitor  -Neurochecks  -Echo pending    Obesity:  -BMI 40.13, diet and exercise encouraged      DVT prophylaxis:  Mechanical DVT prophylaxis orders are present.        CODE STATUS:    Level Of Support Discussed With: Patient  Code Status (Patient has no pulse and is not breathing): CPR (Attempt to Resuscitate)  Medical Interventions (Patient has pulse or is breathing): Full Support    Admission Status:  I believe this patient meets observation status.    Electronically signed by Jany Rodriguez PA-C, 05/22/24, 3:08 PM EDT.        75 minutes has been spent by [facility] providers in the care of this patient while under observation status      I have worn appropriate PPE during this patient encounter, sanitized my hands both with entering and exiting patient's room.

## 2024-05-23 LAB
ALBUMIN SERPL-MCNC: 4 G/DL (ref 3.5–5.2)
ALBUMIN/GLOB SERPL: 1.6 G/DL
ALP SERPL-CCNC: 86 U/L (ref 39–117)
ALT SERPL W P-5'-P-CCNC: 25 U/L (ref 1–33)
ANION GAP SERPL CALCULATED.3IONS-SCNC: 8.2 MMOL/L (ref 5–15)
AST SERPL-CCNC: 17 U/L (ref 1–32)
BILIRUB SERPL-MCNC: <0.2 MG/DL (ref 0–1.2)
BUN SERPL-MCNC: 18 MG/DL (ref 6–20)
BUN/CREAT SERPL: 22 (ref 7–25)
CALCIUM SPEC-SCNC: 9.2 MG/DL (ref 8.6–10.5)
CHLORIDE SERPL-SCNC: 106 MMOL/L (ref 98–107)
CO2 SERPL-SCNC: 23.8 MMOL/L (ref 22–29)
CREAT SERPL-MCNC: 0.82 MG/DL (ref 0.57–1)
DEPRECATED RDW RBC AUTO: 41.8 FL (ref 37–54)
EGFRCR SERPLBLD CKD-EPI 2021: 85.7 ML/MIN/1.73
ERYTHROCYTE [DISTWIDTH] IN BLOOD BY AUTOMATED COUNT: 13.6 % (ref 12.3–15.4)
GLOBULIN UR ELPH-MCNC: 2.5 GM/DL
GLUCOSE BLDC GLUCOMTR-MCNC: 100 MG/DL (ref 70–130)
GLUCOSE SERPL-MCNC: 97 MG/DL (ref 65–99)
HCT VFR BLD AUTO: 36.3 % (ref 34–46.6)
HGB BLD-MCNC: 11.8 G/DL (ref 12–15.9)
MCH RBC QN AUTO: 27.6 PG (ref 26.6–33)
MCHC RBC AUTO-ENTMCNC: 32.5 G/DL (ref 31.5–35.7)
MCV RBC AUTO: 84.8 FL (ref 79–97)
PLATELET # BLD AUTO: 275 10*3/MM3 (ref 140–450)
PMV BLD AUTO: 9.5 FL (ref 6–12)
POTASSIUM SERPL-SCNC: 4.1 MMOL/L (ref 3.5–5.2)
PROT SERPL-MCNC: 6.5 G/DL (ref 6–8.5)
RBC # BLD AUTO: 4.28 10*6/MM3 (ref 3.77–5.28)
SODIUM SERPL-SCNC: 138 MMOL/L (ref 136–145)
WBC NRBC COR # BLD AUTO: 7.15 10*3/MM3 (ref 3.4–10.8)

## 2024-05-23 PROCEDURE — 80053 COMPREHEN METABOLIC PANEL: CPT | Performed by: EMERGENCY MEDICINE

## 2024-05-23 PROCEDURE — 97165 OT EVAL LOW COMPLEX 30 MIN: CPT

## 2024-05-23 PROCEDURE — 97116 GAIT TRAINING THERAPY: CPT

## 2024-05-23 PROCEDURE — 82948 REAGENT STRIP/BLOOD GLUCOSE: CPT

## 2024-05-23 PROCEDURE — G0378 HOSPITAL OBSERVATION PER HR: HCPCS

## 2024-05-23 PROCEDURE — 97162 PT EVAL MOD COMPLEX 30 MIN: CPT

## 2024-05-23 PROCEDURE — 99204 OFFICE O/P NEW MOD 45 MIN: CPT | Performed by: INTERNAL MEDICINE

## 2024-05-23 PROCEDURE — 99233 SBSQ HOSP IP/OBS HIGH 50: CPT | Performed by: NURSE PRACTITIONER

## 2024-05-23 PROCEDURE — 36415 COLL VENOUS BLD VENIPUNCTURE: CPT | Performed by: EMERGENCY MEDICINE

## 2024-05-23 PROCEDURE — 85027 COMPLETE CBC AUTOMATED: CPT | Performed by: EMERGENCY MEDICINE

## 2024-05-23 RX ADMIN — ASPIRIN 325 MG: 325 TABLET ORAL at 08:47

## 2024-05-23 RX ADMIN — Medication 10 ML: at 20:26

## 2024-05-23 RX ADMIN — Medication 10 ML: at 08:47

## 2024-05-23 RX ADMIN — ATORVASTATIN CALCIUM 80 MG: 80 TABLET, FILM COATED ORAL at 20:26

## 2024-05-23 NOTE — PLAN OF CARE
Problem: Adult Inpatient Plan of Care  Goal: Plan of Care Review  Outcome: Ongoing, Progressing  Goal: Patient-Specific Goal (Individualized)  Outcome: Ongoing, Progressing  Goal: Absence of Hospital-Acquired Illness or Injury  Outcome: Ongoing, Progressing  Intervention: Identify and Manage Fall Risk  Recent Flowsheet Documentation  Taken 5/23/2024 1752 by Zena Hood RN  Safety Promotion/Fall Prevention: activity supervised  Taken 5/23/2024 1614 by Zena Hood RN  Safety Promotion/Fall Prevention: activity supervised  Intervention: Prevent and Manage VTE (Venous Thromboembolism) Risk  Recent Flowsheet Documentation  Taken 5/23/2024 1752 by Zena Hood RN  Activity Management: up in chair  Taken 5/23/2024 1614 by Zena Hood RN  Activity Management: up ad taylor  Intervention: Prevent Infection  Recent Flowsheet Documentation  Taken 5/23/2024 1752 by Zena Hood RN  Infection Prevention:   environmental surveillance performed   cohorting utilized  Taken 5/23/2024 1614 by Zena Hood RN  Infection Prevention:   environmental surveillance performed   cohorting utilized  Goal: Optimal Comfort and Wellbeing  Outcome: Ongoing, Progressing  Goal: Readiness for Transition of Care  Outcome: Ongoing, Progressing   Goal Outcome Evaluation:

## 2024-05-23 NOTE — PLAN OF CARE
Goal Outcome Evaluation:            Pt. Came to [facility] from Observation unit aprox. 2130. NIHSS score is a 1 due to mild decreased sensation to right arm and right leg. Otherwise no deficits noted. Educated on plan of care, risk factors for stroke, stroke book given. Patient passed dysphagia screening and was given water.  to stay at bedside. No changes throughout night. Patients risk factors include HTN, High Cholesterol.

## 2024-05-23 NOTE — PLAN OF CARE
Goal Outcome Evaluation:  Plan of Care Reviewed With: (P) patient, spouse        Progress: (P) no change  Outcome Evaluation: (P) Pt is a 53 year old F admitted with acute R extremity weakness but since has resolved. Pt lives at home with her  and reports prior level of function to be (I) in all ADLs and functional mobility. Pt seen this date for OT treatment. Pt agreeable to treatment and eager to return home. Pt reports no pain or any outstanding symptoms. Pt demo'd (I) and safety in bed mobility, all transfers, balance, and functional mobility. Pt has no concerns and all needs have been met at this time. Pt is safe to return home. OT will sign off      Anticipated Discharge Disposition (OT): (P) home

## 2024-05-23 NOTE — THERAPY EVALUATION
Patient Name: Linda Castano  : 1970    MRN: [id number]                              Today's Date: 2024       Admit Date: 2024    Visit Dx:     ICD-10-CM ICD-9-CM   1. Stroke-like symptoms  R29.90 781.99     Patient Active Problem List   Diagnosis    Acute CVA (cerebrovascular accident)    CVA (cerebral vascular accident)     History reviewed. No pertinent past medical history.  History reviewed. No pertinent surgical history.   General Information       Row Name 24          OT Time and Intention    Document Type evaluation (P)   -     Mode of Treatment individual therapy;occupational therapy (P)   -       Row Name 24          General Information    Patient Profile Reviewed yes (P)   -MF     Prior Level of Function independent:;ADL's (P)   -     Existing Precautions/Restrictions no known precautions/restrictions (P)   -     Barriers to Rehab none identified (P)   -       Row Name 24          Living Environment    People in Home spouse (P)   -     Name(s) of People in Home  (P)   -       Row Name 24          Cognition    Orientation Status (Cognition) oriented x 4 (P)   -               User Key  (r) = Recorded By, (t) = Taken By, (c) = Cosigned By      Initials Name Provider Type    Hellen Boggs OT Student OT Student                     Mobility/ADL's       Row Name 24          Bed Mobility    Bed Mobility supine-sit;sit-supine (P)   -     Supine-Sit [place] (Bed Mobility) independent (P)   -MF     Sit-Supine [place] (Bed Mobility) independent (P)   -     Assistive Device (Bed Mobility) bed rails;head of bed elevated (P)   -       Row Name 24          Transfers    Transfers sit-stand transfer;stand-sit transfer (P)   -       Row Name 24          Sit-Stand Transfer    Sit-Stand [place] (Transfers) independent (P)   -       Row Name 24          Stand-Sit  Transfer    Stand-Sit [place] (Transfers) independent (P)   -MF       Row Name 05/23/24 0922          Functional Mobility    Functional Mobility- Ind. Level independent (P)   -     Patient was able to Ambulate yes (P)   -MF       Row Name 05/23/24 0922          Activities of Daily Living    BADL Assessment/Intervention lower body dressing (P)   -MF       Row Name 05/23/24 0922          Lower Body Dressing Assessment/Training    [place] Level (Lower Body Dressing) lower body dressing skills;don;doff;shoes/slippers (P)   -     Position (Lower Body Dressing) edge of bed sitting (P)   -               User Key  (r) = Recorded By, (t) = Taken By, (c) = Cosigned By      Initials Name Provider Type    Hellen Boggs OT Student OT Student                   Obj/Interventions       Row Name 05/23/24 0923          Sensory Assessment (Somatosensory)    Sensory Assessment (Somatosensory) sensation intact (P)   -MF       Row Name 05/23/24 0923          Vision Assessment/Intervention    Visual Impairment/Limitations WNL (P)   -MF       Row Name 05/23/24 0923          Range of Motion Comprehensive    General Range of Motion no range of motion deficits identified (P)   -MF       Row Name 05/23/24 0923          Strength Comprehensive (MMT)    General Manual Muscle Testing (MMT) Assessment no strength deficits identified (P)   -MF       Row Name 05/23/24 0923          Motor Skills    Motor Skills coordination;functional endurance (P)   -     Coordination WNL (P)   -     Functional Endurance good (P)   -MF       Row Name 05/23/24 0923          Balance    Balance Assessment sitting static balance;sitting dynamic balance;sit to stand dynamic balance;standing static balance;standing dynamic balance (P)   -     Static Sitting Balance independent (P)   -     Dynamic Sitting Balance independent (P)   -     Position, Sitting Balance unsupported (P)   -     Sit to Stand Dynamic Balance independent (P)   -      Static Standing Balance independent (P)   -MF     Dynamic Standing Balance independent (P)   -MF     Position/Device Used, Standing Balance unsupported (P)   -MF     Balance Interventions sitting;standing;sit to stand;supported;static;dynamic;minimal challenge (P)   -MF               User Key  (r) = Recorded By, (t) = Taken By, (c) = Cosigned By      Initials Name Provider Type    JERE HalllakeshaHellen george OT Student OT Student                   Goals/Plan       Row Name 05/23/24 0929          Transfer Goal 1 (OT)    Activity/Assistive Device (Transfer Goal 1, OT) transfers, all (P)   -MF     [place] Level/Cues Needed (Transfer Goal 1, OT) independent (P)   -MF     Time Frame (Transfer Goal 1, OT) short term goal (STG);1 day (P)   -MF     Progress/Outcome (Transfer Goal 1, OT) goal met (P)   -MF               User Key  (r) = Recorded By, (t) = Taken By, (c) = Cosigned By      Initials Name Provider Type    JERE RicharddesireHellen miles OT Student OT Student                   Clinical Impression       Row Name 05/23/24 0924          Pain Assessment    Pretreatment Pain Rating 0/10 - no pain (P)   -MF     Posttreatment Pain Rating 0/10 - no pain (P)   -MF       Row Name 05/23/24 0924          Plan of Care Review    Plan of Care Reviewed With patient;spouse (P)   -MF     Progress no change (P)   -MF     Outcome Evaluation Pt is a 53 year old F admitted with acute R extremity weakness but since has resolved. Pt lives at home with her  and reports prior level of function to be (I) in all ADLs and functional mobility. Pt seen this date for OT treatment. Pt agreeable to treatment and eager to return home. Pt reports no pain or any outstanding symptoms. Pt demo'd (I) and safety in bed mobility, all transfers, balance, and functional mobility. Pt has no concerns and all needs have been met at this time. Pt is safe to return home. OT will sign off (P)   -MF       Row Name 05/23/24 0924          Therapy  Assessment/Plan (OT)    Rehab Potential (OT) other (see comments) (P)   -     Criteria for Skilled Therapeutic Interventions Met (OT) no (P)   -MF     Therapy Frequency (OT) evaluation only (P)   -       Row Name 05/23/24 0924          Therapy Plan Review/Discharge Plan (OT)    Anticipated Discharge Disposition (OT) home (P)   -       Row Name 05/23/24 0924          Vital Signs    O2 Delivery Pre Treatment room air (P)   -MF     O2 Delivery Intra Treatment room air (P)   -MF     O2 Delivery Post Treatment room air (P)   -       Row Name 05/23/24 0924          Positioning and Restraints    Pre-Treatment Position in bed (P)   -MF     Post Treatment Position bed (P)   -MF     In Bed notified nsg;fowlers;call light within reach;exit alarm on;with family/caregiver (P)   -               User Key  (r) = Recorded By, (t) = Taken By, (c) = Cosigned By      Initials Name Provider Type    Hellen Boggs, OT Student OT Student                   Outcome Measures       Row Name 05/23/24 0929          How much help from another is currently needed...    Putting on and taking off regular lower body clothing? 4 (P)   -MF     Bathing (including washing, rinsing, and drying) 4 (P)   -MF     Toileting (which includes using toilet bed pan or urinal) 4 (P)   -MF     Putting on and taking off regular upper body clothing 4 (P)   -MF     Taking care of personal grooming (such as brushing teeth) 4 (P)   -MF     Eating meals 4 (P)   -     AM-PAC 6 Clicks Score (OT) 24 (P)   -       Row Name 05/23/24 1041 05/23/24 0847       How much help from another person do you currently need...    Turning from your back to your side while in flat bed without using bedrails? 4  -DJ 4  -LP    Moving from lying on back to sitting on the side of a flat bed without bedrails? 4  -DJ 4  -LP    Moving to and from a bed to a chair (including a wheelchair)? 4  -DJ 4  -LP    Standing up from a chair using your arms (e.g., wheelchair, bedside  chair)? 4  -DJ 4  -LP    Climbing 3-5 steps with a railing? 4  -DJ 4  -LP    To walk in hospital room? 4  -DJ 4  -LP    AM-PAC 6 Clicks Score (PT) 24  -DJ 24  -LP    Highest Level of Mobility Goal 8 --> Walked 250 feet or more  -DJ 8 --> Walked 250 feet or more  -LP      Row Name 05/23/24 1042 05/23/24 0929       Modified [place] Scale    Modified [place] Scale 0 - No Symptoms at all.  -DJ 0 - No Symptoms at all. (P)   -      Row Name 05/23/24 1042 05/23/24 1041       Functional Assessment    Outcome Measure Options Modified [place]  -DJ AM-PAC 6 Clicks Basic Mobility (PT)  -      Row Name 05/23/24 0929          Functional Assessment    Outcome Measure Options AM-PAC 6 Clicks Daily Activity (OT);Modified [place] (P)   -               User Key  (r) = Recorded By, (t) = Taken By, (c) = Cosigned By      Initials Name Provider Type     Arabella Rees, RN Registered Nurse    Elma Ingram, PT Physical Therapist    Hellen Boggs, OT Student OT Student                    Occupational Therapy Education       Title: PT OT SLP Therapies (In Progress)       Topic: Occupational Therapy (Done)       Point: ADL training (Done)       Description:   Instruct learner(s) on proper safety adaptation and remediation techniques during self care or transfers.   Instruct in proper use of assistive devices.                  Learning Progress Summary             Patient REE Ryan DU by  at 5/23/2024 0930   Significant Other REE Ryan DU by  at 5/23/2024 0930                         Point: Home exercise program (Done)       Description:   Instruct learner(s) on appropriate technique for monitoring, assisting and/or progressing therapeutic exercises/activities.                  Learning Progress Summary             Patient REE Ryan DU by  at 5/23/2024 0930   Significant Other REE Ryan DU by  at 5/23/2024 0930                         Point: Precautions (Done)       Description:   Instruct learner(s) on prescribed  precautions during self-care and functional transfers.                  Learning Progress Summary             Patient REE Ryan, DU by  at 5/23/2024 0930   Significant Other REE Ryan, REBEKAH by  at 5/23/2024 0930                         Point: Body mechanics (Done)       Description:   Instruct learner(s) on proper positioning and spine alignment during self-care, functional mobility activities and/or exercises.                  Learning Progress Summary             Patient REE Ryan, DU by  at 5/23/2024 0930   Significant Other REE Ryan, DU by  at 5/23/2024 0930                                         User Key       Initials Effective Dates Name Provider Type Discipline     04/30/24 -  Hellen Macdonald, OT Student OT Student OT                  OT Recommendation and Plan  Therapy Frequency (OT): (P) evaluation only  Plan of Care Review  Plan of Care Reviewed With: (P) patient, spouse  Progress: (P) no change  Outcome Evaluation: (P) Pt is a 53 year old F admitted with acute R extremity weakness but since has resolved. Pt lives at home with her  and reports prior level of function to be (I) in all ADLs and functional mobility. Pt seen this date for OT treatment. Pt agreeable to treatment and eager to return home. Pt reports no pain or any outstanding symptoms. Pt demo'd (I) and safety in bed mobility, all transfers, balance, and functional mobility. Pt has no concerns and all needs have been met at this time. Pt is safe to return home. OT will sign off     Time Calculation:   Evaluation Complexity (OT)  Review Occupational Profile/Medical/Therapy History Complexity: (P) brief/low complexity  Assessment, Occupational Performance/Identification of Deficit Complexity: (P) 1-3 performance deficits  Clinical Decision Making Complexity (OT): (P) problem focused assessment/low complexity  Overall Complexity of Evaluation (OT): (P) low complexity     Time Calculation- OT       Row Name 05/23/24 0930              Time Calculation- OT    OT Start Time 0846 (P)   -      OT Stop Time 0859 (P)   -      OT Time Calculation (min) 13 min (P)   -      OT Received On 05/23/24 (P)   -         Untimed Charges    OT Eval/Re-eval Minutes 13 (P)   -         Total Minutes    Untimed Charges Total Minutes 13 (P)   -       Total Minutes 13 (P)   -                User Key  (r) = Recorded By, (t) = Taken By, (c) = Cosigned By      Initials Name Provider Type     Hellen Macdonald OT Student OT Student                  Therapy Charges for Today       Code Description Service Date Service Provider Modifiers Qty    [id number] HC OT EVAL LOW COMPLEXITY 2 5/23/2024 Hellen Macdonald OT Student GO 1                 GASTON Hammond  5/23/2024

## 2024-05-23 NOTE — PLAN OF CARE
Goal Outcome Evaluation:  Plan of Care Reviewed With: patient        Progress: improving  Outcome Evaluation: A&OX4, UP AD MITA, WORKED C PT AND OT, CARDIO CONSULT FOR POSSIBLE [place], R HAND AND R FOOT SENSATION HAS IMPROVED THIS SHIFT PER PT, VSS, ADEQUATE PO INTAKE, NO C/O PAIN

## 2024-05-23 NOTE — CONSULTS
CONSULT NOTE    INTERNAL MEDICINE   [facility]       Patient Identification:  Name: Linda Castano  Age: 53 y.o.  Sex: female  :  1970  MRN: [id number]             Date of Consultation:  24          Primary Care Physician: Provider, No Known                               Requesting Physician: dr thakkar  Reason for Consultation:assuming care, admission    Chief Complaint:  53 year old female presented to the emergency room with weakness of her right hand and foot which started earlier today; she said her hand and foot felt heavy; she denies change in speech or vision; no focal numbness or tingling; she has a past history of cervical radiculopathy; no fever or chills; she was sent to the observation unit and imaging was positive for cva and admission was requested;     History of Present Illness:   As above      Past Medical History:  Htn  hyperglycemia  Past Surgical History:  History reviewed. No pertinent surgical history.   Home Meds:  No medications prior to admission.     Current Meds:     Current Facility-Administered Medications:     aspirin tablet 325 mg, 325 mg, Oral, Daily, 325 mg at 24 1650 **OR** aspirin suppository 300 mg, 300 mg, Rectal, Daily, Jr Mason MD    atorvastatin (LIPITOR) tablet 80 mg, 80 mg, Oral, Nightly, Jr Mason MD, 80 mg at 24 2040    midazolam (VERSED) injection 1 mg, 1 mg, Intravenous, Q4H PRN, Jr Mason MD, 1 mg at 24 1018    ondansetron (ZOFRAN) injection 4 mg, 4 mg, Intravenous, Q6H PRN, Jr Mason MD    sodium chloride 0.9 % flush 10 mL, 10 mL, Intravenous, PRN, Jr Mason MD    sodium chloride 0.9 % flush 10 mL, 10 mL, Intravenous, Q12H, Jr Mason MD, 10 mL at 24 2040    sodium chloride 0.9 % flush 10 mL, 10 mL, Intravenous, PRN, Jr Mason MD    sodium chloride 0.9 % infusion 40 mL, 40 mL, Intravenous, PRN, Jr Mason MD  Allergies:  Allergies   Allergen Reactions    Sulfa  "Antibiotics Hives     Social History:   Social History     Socioeconomic History    Marital status:    Tobacco Use    Smoking status: Never    Smokeless tobacco: Never   Vaping Use    Vaping status: Never Used   Substance and Sexual Activity    Alcohol use: Yes     Comment: occasionally, 1x a month    Drug use: Never    Sexual activity: Defer     Family History:  Family History   Problem Relation Age of Onset    Hypertension Mother     Stroke Father     Hypertension Father     Hyperlipidemia Father           Review of Systems  See history of present illness and past medical history.  Patient denies headache, dizziness, syncope, falls, trauma, change in vision, change in hearing, change in taste, changes in weight, changes in appetite; no, epistaxis, hemoptysis, nausea, vomiting,hematemesis, diarrhea, constipation or hematochezia. Denies cold or heat intolerance, polydipsia, polyuria, polyphagia. Denies hematuria, pyuria, dysuria, hesitancy, frequency or urgency. Denies consumption of raw and under cooked meats foods or change in water source.  Denies fever, chills, sweats, night sweats.  Denies missing any routine medications. Remainder of ROS is negative.      Vitals:   /86 (BP Location: Right arm, Patient Position: Lying)   Pulse 70   Temp 98.6 °F (37 °C) (Oral)   Resp 16   Ht 157.5 cm (62\")   Wt 99.5 kg (219 lb 6.4 oz)   SpO2 97%   BMI 40.13 kg/m²   I/O:   Intake/Output Summary (Last 24 hours) at 5/22/2024 2304  Last data filed at 5/22/2024 1426  Gross per 24 hour   Intake 240 ml   Output --   Net 240 ml     Exam:  General Appearance:    Alert, cooperative, no distress, appears stated age   Head:    Normocephalic, without obvious abnormality, atraumatic   Eyes:    PERRL, conjunctivae/corneas clear, EOM's intact, both eyes   Ears:    Normal external ear canals, both ears   Nose:   Nares normal, septum midline, mucosa normal, no drainage    or sinus tenderness   Throat:   Lips, tongue, gums " normal; oral mucosa pink and moist   Neck:   Supple, symmetrical, trachea midline, no adenopathy;     thyroid:  no enlargement/tenderness/nodules; no carotid    bruit or JVD   Back:     Symmetric, no curvature, ROM normal, no CVA tenderness   Lungs:     Clear to auscultation bilaterally, respirations unlabored   Chest Wall:    No tenderness or deformity    Heart:    Regular rate and rhythm, S1 and S2 normal, no murmur, rub   or gallop   Abdomen:     Soft, nontender, bowel sounds active all four quadrants,     no masses, no hepatomegaly, no splenomegaly   Extremities:   Extremities normal, atraumatic, no cyanosis or edema                Data Review:  Labs in chart were reviewed.  WBC   Date Value Ref Range Status   05/22/2024 6.80 3.40 - 10.80 10*3/mm3 Final     Hemoglobin   Date Value Ref Range Status   05/22/2024 13.5 12.0 - 15.9 g/dL Final     Hematocrit   Date Value Ref Range Status   05/22/2024 40.5 34.0 - 46.6 % Final     Platelets   Date Value Ref Range Status   05/22/2024 300 140 - 450 10*3/mm3 Final     Sodium   Date Value Ref Range Status   05/22/2024 139 136 - 145 mmol/L Final     Potassium   Date Value Ref Range Status   05/22/2024 4.2 3.5 - 5.2 mmol/L Final     Chloride   Date Value Ref Range Status   05/22/2024 103 98 - 107 mmol/L Final     CO2   Date Value Ref Range Status   05/22/2024 22.9 22.0 - 29.0 mmol/L Final     BUN   Date Value Ref Range Status   05/22/2024 19 6 - 20 mg/dL Final     Creatinine   Date Value Ref Range Status   05/22/2024 0.89 0.57 - 1.00 mg/dL Final     Glucose   Date Value Ref Range Status   05/22/2024 101 (H) 65 - 99 mg/dL Final     Calcium   Date Value Ref Range Status   05/22/2024 9.5 8.6 - 10.5 mg/dL Final     Results from last 7 days   Lab Units 05/22/24  0905   TSH uIU/mL 4.100     Results from last 7 days   Lab Units 05/22/24  0905   HEMOGLOBIN A1C % 5.80*       Imaging Results (Last 7 Days)       Procedure Component Value Units Date/Time    CT Head Without Contrast  Stroke Protocol [[id number]] Collected: 05/22/24 1013     Updated: 05/22/24 1715    Narrative:      EMERGENCY CT SCAN OF THE HEAD WITHOUT CONTRAST ON 05/22/2024     CLINICAL HISTORY: Acute stroke suspected, patient has right-sided  weakness and dizziness.     TECHNIQUE: Spiral CT images were obtained from the base of the skull to  the vertex without intravenous contrast. The images were reformatted and  are submitted in 3 mm thick axial, sagittal and coronal CT sections with  brain algorithm.     COMPARISON: There are no prior CTs for comparison.     FINDINGS: There is a discrete ovoid area of low density extending from  the anterior body of the right caudate nucleus into the anterior limb of  the right internal capsule and measures 15 x 6 x 9 mm in anterior  posterior, medial lateral and craniocaudal dimension and is consistent  with a subacute or chronic lacunar infarct. The remainder of the brain  parenchyma is normal in attenuation. The ventricles are normal in size.  I see no focal mass effect. There is no midline shift. No extra-axial  fluid collections are identified. There is no evidence of acute  intracranial hemorrhage. The calvarium and skull base are normal in  appearance. The paranasal sinuses and the mastoid air cells and middle  ear cavities are clear. The orbits are normal in appearance.        Impression:      1. There is a discrete 15 x 6 x 9 mm ovoid area of low density extending  from the anterior body of the right caudate nucleus into the anterior  limb of the right internal capsule that most probably represents either  a late subacute or an old lacunar infarct. The remainder of the head CT  is within normal limits. The etiology of the patient's acute onset  right-sided weakness and dizziness is not established on this exam. The  results were communicated to CLARISSE Hewitt, the Stroke Neurologist, while  the patient was on our scanner on 05/22/2024 at 9:15 a.m. He informed me  that the patient is  going to get an MRI of the brain and will get a  follow-up CT angiogram of the head and neck. I also communicated the  results to Dr. Garcia from the ER by telephone on 05/22/2024 at 9:30  a.m.     Radiation dose reduction techniques were utilized, including automated  exposure control and exposure modulation based on body size.        This report was finalized on 5/22/2024 5:11 PM by Dr. Carlos Starkey M.D  on Workstation: [id number]       CT Angiogram Head [[id number]] Resulted: 05/22/24 1645     Updated: 05/22/24 1646    CT Angiogram Neck [[id number]] Resulted: 05/22/24 1645     Updated: 05/22/24 1646    MRI Brain With & Without Contrast [[id number]] Collected: 05/22/24 1443     Updated: 05/22/24 1625    Narrative:      STAT MRI OF THE BRAIN WITH AND WITHOUT CONTRAST AND MRI OF THE CERVICAL  SPINE WITH AND WITHOUT CONTRAST ON 05/22/2024     CLINICAL HISTORY: Acute right hand and foot weakness that started around  6 a.m. this morning and has since resolved.     MRI OF THE BRAIN TECHNIQUE: Axial T1, FLAIR, fat-suppressed T2, axial  diffusion and gradient echo T2, sagittal T1 and postcontrast axial  fat-suppressed T1 and coronal T1-weighted images were obtained of the  entire head.     COMPARISON: There are no prior MRIs of the brain for comparison. This  study is correlated to a head CT earlier this morning on 05/22/2024 at  9:00 a.m.     FINDINGS: There is a discrete punched out area of encephalomalacia  extending from the body of the right caudate nucleus into the mid corona  radiata region and junction of the anterior limb and genu of the right  internal capsule compatible with an old lacunar infarct that maximally  measures 15 x 8 x 8 mm. There is a focal somewhat triangular-shaped area  of intense increased signal intensity on the diffusion weighted images  and low signal on the ADC maps within the superior left frontal parietal  subcortical white matter tracking inferiorly to near the posterior left  corona  radiata region that measures 11 x 10 mm in size and this is  consistent with an area of cytotoxic edema and a small acute infarct.  The remainder of the brain parenchyma is normal in signal intensity. The  ventricles are normal in size. I see no focal mass effect. There is no  midline shift. No extra-axial fluid collections are identified. No  abnormal areas of enhancement are seen in the head. The paranasal  sinuses and the mastoid air cells and the middle ear cavities are clear.  Good flow voids are demonstrated within the cerebral vessels and in the  dural venous sinuses. The calvarium and the skull base demonstrate  normal marrow signal intensity. The orbits are normal in appearance.       Impression:      1. There is a 15 x 8 x 8 mm old lacunar infarct extending from the body  of the right caudate nucleus into the mid right corona radiata region  and superior aspect of the genu of the right internal capsule.     2. There is a triangular area of acute infarction measuring 10 x 11 mm  in size extending from the superior left frontal parietal subcortical  white matter into the posterior left corona radiata region. The  remainder of the MRI of the brain is normal.     MRI OF THE CERVICAL SPINE TECHNIQUE: Sagittal T1, gradient echo T2 and  fat-suppressed fast spin-echo T2 and postcontrast sagittal  fat-suppressed T1-weighted images were obtained of the cervical spine.  In addition, axial gradient echo and fast spin echo T2 and postcontrast  axial T1-weighted images were obtained from the skull base to the T1  thoracic level.      FINDINGS: The craniocervical junction is normal in appearance. The  cervical cord and the upper thoracic spinal cord is normal in contour  and signal intensity.     At C2-3, the disc space, facets and uncovertebral joints are normal in  appearance with no canal or foraminal narrowing.     At C3-4, there is mild disc space narrowing, minimal degenerative  endplate changes, minimal posterior  spurring with minimal if any canal  narrowing. There is some uncovertebral joint hypertrophy, the facets are  normal and there is essentially no foraminal narrowing.     At C4-5, there is some disc space narrowing, degenerative endplate  changes, mild diffuse posterior disc osteophyte complex abuts the  ventral surface of the cord mildly narrowing the canal. The facets are  normal and there is some bilateral uncovertebral joint hypertrophy and  there is mild to moderate bilateral bony foraminal narrowing.     At C5-6, there is some disc space narrowing, degenerative endplate  changes, mild diffuse posterior disc osteophyte complex, minimal canal  narrowing. The facets are normal. There is some uncovertebral joint  hypertrophy and there is mild right and mild to moderate left bony  foraminal narrowing.     At C6-7, there is mild disc space narrowing, degenerative endplate  changes, minimal posterior spurring but no canal narrowing. The facets  are normal. There is mild uncovertebral joint hypertrophy, there is  essentially no foraminal narrowing.     At C7-T1, the posterior disc margin and facets are normal with no canal  or foraminal narrowing.     IMPRESSION:  1. There is mild cervical spondylosis as described. There is some disc  space narrowing and degenerative endplate changes from C3 to C7 and  posterior spurs minimally narrow the canal at C3-4. Posterior spurs  indent the ventral thecal sac and abut the ventral cord mildly narrowing  the canal at C4-5 and posterior spurs minimally indent the ventral  thecal sac resulting in minimal if any canal narrowing at C5-6 and C6-7.  No cervical disc herniation is identified and no additional cervical  canal narrowing is identified. There is some uncovertebral joint  spurring into the neural foramen on resulting in minimal foraminal  narrowing at C3-4 up to mild to moderate bilateral bony foraminal  narrowing at C4-5 and mild right and mild to moderate left  bony  foraminal narrowing at C5-6. The remainder of the cervical spine MRI is  unremarkable. The results of this study were communicated to Dr. Hewitt from Stroke Neurology by telephone on 05/22/2024 at 2:00 p.m.     This report was finalized on 5/22/2024 4:22 PM by Dr. Carlos Starkey M.D  on Workstation: [id number]       MRI Cervical Spine With & Without Contrast [[id number]] Collected: 05/22/24 1443     Updated: 05/22/24 1625    Narrative:      STAT MRI OF THE BRAIN WITH AND WITHOUT CONTRAST AND MRI OF THE CERVICAL  SPINE WITH AND WITHOUT CONTRAST ON 05/22/2024     CLINICAL HISTORY: Acute right hand and foot weakness that started around  6 a.m. this morning and has since resolved.     MRI OF THE BRAIN TECHNIQUE: Axial T1, FLAIR, fat-suppressed T2, axial  diffusion and gradient echo T2, sagittal T1 and postcontrast axial  fat-suppressed T1 and coronal T1-weighted images were obtained of the  entire head.     COMPARISON: There are no prior MRIs of the brain for comparison. This  study is correlated to a head CT earlier this morning on 05/22/2024 at  9:00 a.m.     FINDINGS: There is a discrete punched out area of encephalomalacia  extending from the body of the right caudate nucleus into the mid corona  radiata region and junction of the anterior limb and genu of the right  internal capsule compatible with an old lacunar infarct that maximally  measures 15 x 8 x 8 mm. There is a focal somewhat triangular-shaped area  of intense increased signal intensity on the diffusion weighted images  and low signal on the ADC maps within the superior left frontal parietal  subcortical white matter tracking inferiorly to near the posterior left  corona radiata region that measures 11 x 10 mm in size and this is  consistent with an area of cytotoxic edema and a small acute infarct.  The remainder of the brain parenchyma is normal in signal intensity. The  ventricles are normal in size. I see no focal mass effect. There is  no  midline shift. No extra-axial fluid collections are identified. No  abnormal areas of enhancement are seen in the head. The paranasal  sinuses and the mastoid air cells and the middle ear cavities are clear.  Good flow voids are demonstrated within the cerebral vessels and in the  dural venous sinuses. The calvarium and the skull base demonstrate  normal marrow signal intensity. The orbits are normal in appearance.       Impression:      1. There is a 15 x 8 x 8 mm old lacunar infarct extending from the body  of the right caudate nucleus into the mid right corona radiata region  and superior aspect of the genu of the right internal capsule.     2. There is a triangular area of acute infarction measuring 10 x 11 mm  in size extending from the superior left frontal parietal subcortical  white matter into the posterior left corona radiata region. The  remainder of the MRI of the brain is normal.     MRI OF THE CERVICAL SPINE TECHNIQUE: Sagittal T1, gradient echo T2 and  fat-suppressed fast spin-echo T2 and postcontrast sagittal  fat-suppressed T1-weighted images were obtained of the cervical spine.  In addition, axial gradient echo and fast spin echo T2 and postcontrast  axial T1-weighted images were obtained from the skull base to the T1  thoracic level.      FINDINGS: The craniocervical junction is normal in appearance. The  cervical cord and the upper thoracic spinal cord is normal in contour  and signal intensity.     At C2-3, the disc space, facets and uncovertebral joints are normal in  appearance with no canal or foraminal narrowing.     At C3-4, there is mild disc space narrowing, minimal degenerative  endplate changes, minimal posterior spurring with minimal if any canal  narrowing. There is some uncovertebral joint hypertrophy, the facets are  normal and there is essentially no foraminal narrowing.     At C4-5, there is some disc space narrowing, degenerative endplate  changes, mild diffuse posterior disc  osteophyte complex abuts the  ventral surface of the cord mildly narrowing the canal. The facets are  normal and there is some bilateral uncovertebral joint hypertrophy and  there is mild to moderate bilateral bony foraminal narrowing.     At C5-6, there is some disc space narrowing, degenerative endplate  changes, mild diffuse posterior disc osteophyte complex, minimal canal  narrowing. The facets are normal. There is some uncovertebral joint  hypertrophy and there is mild right and mild to moderate left bony  foraminal narrowing.     At C6-7, there is mild disc space narrowing, degenerative endplate  changes, minimal posterior spurring but no canal narrowing. The facets  are normal. There is mild uncovertebral joint hypertrophy, there is  essentially no foraminal narrowing.     At C7-T1, the posterior disc margin and facets are normal with no canal  or foraminal narrowing.     IMPRESSION:  1. There is mild cervical spondylosis as described. There is some disc  space narrowing and degenerative endplate changes from C3 to C7 and  posterior spurs minimally narrow the canal at C3-4. Posterior spurs  indent the ventral thecal sac and abut the ventral cord mildly narrowing  the canal at C4-5 and posterior spurs minimally indent the ventral  thecal sac resulting in minimal if any canal narrowing at C5-6 and C6-7.  No cervical disc herniation is identified and no additional cervical  canal narrowing is identified. There is some uncovertebral joint  spurring into the neural foramen on resulting in minimal foraminal  narrowing at C3-4 up to mild to moderate bilateral bony foraminal  narrowing at C4-5 and mild right and mild to moderate left bony  foraminal narrowing at C5-6. The remainder of the cervical spine MRI is  unremarkable. The results of this study were communicated to Dr. Hewitt from Stroke Neurology by telephone on 05/22/2024 at 2:00 p.m.     This report was finalized on 5/22/2024 4:22 PM by Dr. Carlos Starkey  M.D  on Workstation: [id number]       XR Chest 1 View [[id number]] Collected: 05/22/24 1031     Updated: 05/22/24 1036    Narrative:      XR CHEST 1 VW-     INDICATION: Acute stroke protocol     COMPARISON: 2/8/2015       Impression:      No focal consolidation. No pleural effusion or pneumothorax.   Normal size cardiomediastinal silhouette.  No focal osseous  abnormality.       This report was finalized on 5/22/2024 10:33 AM by Dr. Lex James M.D on Workstation: [id number]             History reviewed. No pertinent past medical history.    Assessment:  Active Hospital Problems    Diagnosis  POA    **Acute CVA (cerebrovascular accident) [I63.9]  Unknown    CVA (cerebral vascular accident) [I63.9]  Yes      Resolved Hospital Problems    Diagnosis Date Resolved POA    TIA (transient ischemic attack) [G45.9] 05/22/2024 Yes   Hypertension  hyperglycemia    Plan:  Will continue stroke workup  Trend labs  Monitor on telemetry  Dw patient and provider from the observation unit    Juliana Holden MD   5/22/2024  23:04 EDT

## 2024-05-23 NOTE — PAYOR COMM NOTE
"Linda Castano (53 y.o. Female)          INPATIENT REQUEST FOR [id number]    CONTACT KARI BRAVO  P# [phone]  F# [phone]         Date of Birth   1970    Social Security Number       Address   [address] [place] [address]    Home Phone   [phone]    MRN   [id number]       [employer]    Marital Status                               Admission Date   5/22/24    Admission Type   Emergency    Admitting Provider   Juliana Holden MD    Attending Provider   Carlos Humphrey MD    Department, Room/Bed   [address], P580/1       Discharge Date       Discharge Disposition       Discharge Destination                                 Attending Provider: Carlos Humphrey MD    Allergies: Sulfa Antibiotics    Isolation: None   Infection: None   Code Status: CPR    Ht: 157.5 cm (62\")   Wt: 99.5 kg (219 lb 6.4 oz)    Admission Cmt: None   Principal Problem: Acute CVA (cerebrovascular accident) [I63.9]                   Active Insurance as of 5/22/2024       Primary Coverage       Payor Plan Insurance Group Employer/Plan Group    [employer]HE[employer] BLUE CROSS AN[employer]M BLUE CROSS BLUE SHIELD PPO [id number]       Payor Plan Address Payor Plan Phone Number Payor Plan Fax Number Effective Dates    [phone]  12/1/2021 - None Entered    [place] [address]         Subscriber Name Subscriber Birth Date Member ID       ADRIAN CASTANO 1970 [id number]                     Emergency Contacts        (Rel.) Home Phone Work Phone Mobile Phone    dex Rider (Spouse) [phone] -- --                 History & Physical        Jany Rodriguez PA-C at 05/22/24 1508       Attestation signed by Jr Mason MD at 05/22/24 2116    I agree with the midlevel provider's findings and plan.  I reviewed the midlevel providers note.                   [facility]   HISTORY AND PHYSICAL    Patient Name: Linda TEIXEIRA" Eyad  : 1970  MRN: [id number]  Primary Care Physician:  Provider, No Known  Date of admission: 2024    Subjective  Subjective     Chief Complaint:   Chief Complaint   Patient presents with    Extremity Weakness         HPI:    Linda Castano is a 53 y.o. female with no significant past medical history who is being admitted for acute CVA.  Patient reports that at she woke up around 620 this morning and walked to the bathroom while in the bathroom she noticed that her right foot seemed heavier and like it was dropping and then she went to brush her hair and noticed that her right hand she did not have as much control and it felt heavier and she felt like this started at 625.  Patient denies any difficulty speaking, slurred speech, facial droop, chest pain, lightheadedness, headache, or dyspnea.  Patient denies ever having symptoms like this prior.  Patient states that her right hand and right foot feel mildly better but still not at baseline.  Patient denies any recent trauma.  Patient states that she was treated for hypertension in the past but she lost a bunch of weight and she did not need blood pressure medicine anymore and her primary care took her off of it, but states that that was about 10 years ago and she has not really followed up with anybody since.    Review of Systems   All systems were reviewed and negative except for: All pertinent positives are listed in the above HPI    Personal History     History reviewed. No pertinent past medical history.    History reviewed. No pertinent surgical history.    Family History: family history includes Hyperlipidemia in her father; Hypertension in her father and mother; Stroke in her father. Otherwise pertinent FHx was reviewed and not pertinent to current issue.    Social History:  reports that she has never smoked. She has never used smokeless tobacco. She reports current alcohol use. She reports that she does not use drugs.    Home  Medications:       Allergies:  Allergies   Allergen Reactions    Sulfa Antibiotics Hives       Objective  Objective     Vitals:   Temp:  [97.4 °F (36.3 °C)-98.1 °F (36.7 °C)] 98.1 °F (36.7 °C)  Heart Rate:  [66-80] 75  Resp:  [18] 18  BP: (140-175)/() 140/75  Physical Exam    Constitutional: Awake, alert, well-groomed healthy appearing female   Eyes: PERRL, sclerae anicteric, no conjunctival injection, EOMI   HENT: NCAT, mucous membranes moist, normal hearing   Neck: Supple, nontender, trachea midline   Respiratory: Clear to auscultation bilaterally, nonlabored respirations on room air   Cardiovascular: RRR, no murmurs, rubs, or gallops, palpable pedal pulses bilaterally   Gastrointestinal: Positive bowel sounds, soft, nontender, nondistended   Musculoskeletal: No bilateral ankle edema, no clubbing or cyanosis to extremities   Psychiatric: Appropriate affect, cooperative   Neurologic: Oriented x 3, strength symmetric in all extremities, Cranial Nerves grossly intact to confrontation, speech clear; NIH 2 for very mild drift of right hand and mild paresthesias of right arm   Skin: No rashes     Result Review   Result Review:  I have personally reviewed the results from the time of this admission to 5/22/2024 15:08 EDT and agree with these findings:  [x]  Laboratory list / accordion  []  Microbiology  [x]  Radiology  [x]  EKG/Telemetry   []  Cardiology/Vascular   []  Pathology  []  Old records  []  Other:  Most notable findings include: Potassium within normal limits, serum creatinine within normal limits, A1c 5.8, THC within normal limits, vitamin B12 and folate within normal limits, total cholesterol elevated at 250 and LDL elevated at 177, INR 0.88 and normal WBC.  CT head without showed a discrete 15 x 6 x 9 mm ovoid area of low density extending from the anterior body of the right caudate nucleus into the anterior limb of the right internal capsule that most probably represents either a late subacute or old  lacunar infarct; remainder of the head CT within normal limits.  MRI brain with and without obtained and shows a 15 x 8 x 8 mm old lacunar infarct extending from the body of the right caudate nucleus into the mid right corona radiata region and superior aspect of the genu of the right internal capsule, there is a triangular area of acute infarction measuring 10 x 11 mm in size extending from the superior left frontal parietal subcortical white matter into the posterior left corona radiata region.  MRI of the cervical spine shows mild cervical spondylosis, some disc space narrowing and degenerative endplate changes from C3-C7 and posterior spurs minimally narrows the canal at C3-4.  Posterior spurs indent the ventral thecal sac and abuts the ventral cord mildly narrowing the canal at C4-5 and posterior spurs minimally indents the ventral thecal sac resulting in minimal if any canal narrowing at C5-6 and C6-7, no cervical disc herniation is identified and no additional cervical canal narrowing is identified, there is some uncovertebral joint spurring into the neural foramen on resulting in minimal foraminal narrowing at C3-4 up to mild to moderate bilateral bony foraminal narrowing at C4-5 and mild right and mild to moderate left bony foraminal narrowing at C5-6.  EKG shows sinus rhythm without any acute ST changes.      Assessment & Plan  Assessment / Plan     Brief Patient Summary:  Linda Castano is a 53 y.o. female who is being admitted for acute CVA    Active Hospital Problems:  Active Hospital Problems    Diagnosis     **Acute CVA (cerebrovascular accident)      Plan:   Acute CVA:  -  CT head without showed a discrete 15 x 6 x 9 mm ovoid area of low density extending from the anterior body of the right caudate nucleus into the anterior limb of the right internal capsule that most probably represents either a late subacute or old lacunar infarct; remainder of the head CT within normal limits.  -  MRI brain with  and without obtained and shows a 15 x 8 x 8 mm old lacunar infarct extending from the body of the right caudate nucleus into the mid right corona radiata region and superior aspect of the genu of the right internal capsule, there is a triangular area of acute infarction measuring 10 x 11 mm in size extending from the superior left frontal parietal subcortical white matter into the posterior left corona radiata region.  -Aspirin and statin started  -Neurology saw and evaluated the patient in ER and recommends permissive hypertension for 24 to 48 hours, do not treat unless BP is greater than 220/120  -Continuous cardiac monitor  -Neurochecks  -Echo pending    Obesity:  -BMI 40.13, diet and exercise encouraged      DVT prophylaxis:  Mechanical DVT prophylaxis orders are present.        CODE STATUS:    Level Of Support Discussed With: Patient  Code Status (Patient has no pulse and is not breathing): CPR (Attempt to Resuscitate)  Medical Interventions (Patient has pulse or is breathing): Full Support    Admission Status:  I believe this patient meets observation status.    Electronically signed by Jany Rodriguez PA-C, 05/22/24, 3:08 PM EDT.        75 minutes has been spent by [facility] providers in the care of this patient while under observation status      I have worn appropriate PPE during this patient encounter, sanitized my hands both with entering and exiting patient's room.    Electronically signed by Jr Mason MD at 05/22/24 2116          Emergency Department Notes        Britta Albarran RN at 05/22/24 0857          Patient needed to use restroom before assessment. In her room @ 0900      Electronically signed by Britta Albarran RN at 05/22/24 0846       Lex Garcia MD at 05/22/24 0855           EMERGENCY DEPARTMENT ENCOUNTER  Room Number:  03/03  PCP: Provider, No Known  Independent Historians: Patient and Family      HPI:  Chief Complaint: had concerns including  Extremity Weakness.     Context: Linda Castano is a 53 y.o. female with a medical history of cervical radiculopathy who presents to the ED c/o acute extremity weakness.  Patient states around 6:00 this morning she began noticing heaviness and weakness of her right foot.  Patient additionally tried to brush her hair and noticed her right hand did not seem to be working the way she expected.  Patient additionally reports some tingling in the right upper extremity.  Symptoms have gradually improved with only minimal weakness in the right lower extremity and minimal tingling sensation in the right upper extremity.  Patient with no previous CVA.  Patient has been diagnosed with cervical radiculopathy in the past.    Review of prior external notes (non-ED) -and- Review of prior external test results outside of this encounter: [facility] EPIC system [facility] no [facility] visit [facility] and no prior diagnostic studies available for review.    PAST MEDICAL HISTORY  Active Ambulatory Problems     Diagnosis Date Noted    No Active Ambulatory Problems     Resolved Ambulatory Problems     Diagnosis Date Noted    No Resolved Ambulatory Problems     No Additional Past Medical History         PAST SURGICAL HISTORY  History reviewed. No pertinent surgical history.      FAMILY HISTORY  Family History   Problem Relation Age of Onset    Hypertension Mother     Stroke Father     Hypertension Father     Hyperlipidemia Father          SOCIAL HISTORY  Social History     Socioeconomic History    Marital status:    Tobacco Use    Smoking status: Never    Smokeless tobacco: Never   Vaping Use    Vaping status: Never Used   Substance and Sexual Activity    Alcohol use: Yes     Comment: occasionally, 1x a month    Drug use: Not Currently    Sexual activity: Defer         ALLERGIES  Sulfa antibiotics      REVIEW OF SYSTEMS  Review of Systems  Included in HPI  All systems reviewed and negative except for  those discussed in HPI.      PHYSICAL EXAM    I have reviewed the triage vital signs and nursing notes.    ED Triage Vitals [05/22/24 0800]   Temp Heart Rate Resp BP SpO2   97.4 °F (36.3 °C) 78 18 -- 97 %      Temp src Heart Rate Source Patient Position BP Location FiO2 (%)   -- -- -- -- --       Physical Exam  GENERAL: alert, no acute distress  SKIN: Warm, dry  HENT: Normocephalic, atraumatic  EYES: no scleral icterus  CV: regular rhythm, regular rate  RESPIRATORY: normal effort, lungs clear  ABDOMEN: soft, nontender, nondistended  MUSCULOSKELETAL: no deformity  NEURO: alert, moves all extremities, follows commands.  Mild weakness of the right lower extremity.  Mild sensory deficit of the right upper extremity.  No gross motor deficits.  No aphasia, no facial droop, no ataxia            LAB RESULTS  Recent Results (from the past 24 hour(s))   Green Top (Gel)    Collection Time: 05/22/24  9:05 AM   Result Value Ref Range    Extra Tube Hold for add-ons.    Lavender Top    Collection Time: 05/22/24  9:05 AM   Result Value Ref Range    Extra Tube hold for add-on    Gold Top - SST    Collection Time: 05/22/24  9:05 AM   Result Value Ref Range    Extra Tube Hold for add-ons.    Light Blue Top    Collection Time: 05/22/24  9:05 AM   Result Value Ref Range    Extra Tube Hold for add-ons.    aPTT    Collection Time: 05/22/24  9:05 AM    Specimen: Blood   Result Value Ref Range    PTT 31.2 22.7 - 35.4 seconds   Protime-INR    Collection Time: 05/22/24  9:05 AM    Specimen: Blood   Result Value Ref Range    Protime 12.2 11.7 - 14.2 Seconds    INR 0.88 (L) 0.90 - 1.10   CBC Auto Differential    Collection Time: 05/22/24  9:05 AM    Specimen: Blood   Result Value Ref Range    WBC 6.80 3.40 - 10.80 10*3/mm3    RBC 4.85 3.77 - 5.28 10*6/mm3    Hemoglobin 13.5 12.0 - 15.9 g/dL    Hematocrit 40.5 34.0 - 46.6 %    MCV 83.5 79.0 - 97.0 fL    MCH 27.8 26.6 - 33.0 pg    MCHC 33.3 31.5 - 35.7 g/dL    RDW 13.2 12.3 - 15.4 %    RDW-SD  40.4 37.0 - 54.0 fl    MPV 9.2 6.0 - 12.0 fL    Platelets 300 140 - 450 10*3/mm3    Neutrophil % 63.2 42.7 - 76.0 %    Lymphocyte % 26.3 19.6 - 45.3 %    Monocyte % 8.1 5.0 - 12.0 %    Eosinophil % 1.5 0.3 - 6.2 %    Basophil % 0.6 0.0 - 1.5 %    Immature Grans % 0.3 0.0 - 0.5 %    Neutrophils, Absolute 4.30 1.70 - 7.00 10*3/mm3    Lymphocytes, Absolute 1.79 0.70 - 3.10 10*3/mm3    Monocytes, Absolute 0.55 0.10 - 0.90 10*3/mm3    Eosinophils, Absolute 0.10 0.00 - 0.40 10*3/mm3    Basophils, Absolute 0.04 0.00 - 0.20 10*3/mm3    Immature Grans, Absolute 0.02 0.00 - 0.05 10*3/mm3    nRBC 0.0 0.0 - 0.2 /100 WBC   Type & Screen    Collection Time: 05/22/24  9:06 AM    Specimen: Blood   Result Value Ref Range    ABO Type O     RH type Positive     Antibody Screen Negative     T&S Expiration Date 5/25/2024 11:59:59 PM    ECG 12 Lead Stroke Evaluation    Collection Time: 05/22/24  9:20 AM   Result Value Ref Range    QT Interval 388 ms    QTC Interval 419 ms         RADIOLOGY  CT Head Without Contrast Stroke Protocol    Result Date: 5/22/2024  EMERGENCY CT SCAN OF THE HEAD WITHOUT CONTRAST ON 05/22/2024  CLINICAL HISTORY: Acute stroke suspected, patient has right-sided weakness and dizziness.  TECHNIQUE: Spiral CT images were obtained from the base of the skull to the vertex without intravenous contrast. The images were reformatted and are submitted in 3 mm thick axial, sagittal and coronal CT sections with brain algorithm.  COMPARISON: There are no prior CTs for comparison.  FINDINGS: There is a discrete ovoid area of low density extending from the anterior body of the right caudate nucleus into the anterior limb of the right internal capsule and measures 15 x 6 x 9 mm in anterior posterior, medial lateral and craniocaudal dimension and is consistent with a subacute or chronic lacunar infarct. The remainder of the brain parenchyma is normal in attenuation. The ventricles are normal in size. I see no focal mass effect.  There is no midline shift. No extra-axial fluid collections are identified. There is no evidence of acute intracranial hemorrhage. The calvarium and skull base are normal in appearance. The paranasal sinuses and the mastoid air cells and middle ear cavities are clear. The orbits are normal in appearance.      1. There is a discrete 15 x 6 x 9 mm ovoid area of low density extending from the anterior body of the right caudate nucleus into the anterior limb of the right internal capsule that most probably represents either a late subacute or old lacunar infarct, I think it is unlikely to be demyelinating in origin. The remainder of the head CT is within normal limits. The etiology of the patient's acute onset right-sided weakness and dizziness is not established on this exam. The results were communicated to CLARISSE Hewitt, the Stroke Neurologist, while the patient was on our scanner on 05/22/2024 at 9:15 a.m. He informed me that the patient is going to get an MRI of the brain and will get a follow-up CT angiogram of the head and neck. I also communicated the results to Dr. Garcia from the ER by telephone on 05/22/2024 at 9:30 a.m.  Radiation dose reduction techniques were utilized, including automated exposure control and exposure modulation based on body size.          MEDICATIONS GIVEN IN ER  Medications   sodium chloride 0.9 % flush 10 mL (has no administration in time range)   midazolam (VERSED) injection 1 mg (1 mg Intravenous Given 5/22/24 1018)   sodium chloride 0.9 % flush 10 mL (has no administration in time range)   sodium chloride 0.9 % flush 10 mL (has no administration in time range)   sodium chloride 0.9 % infusion 40 mL (has no administration in time range)   atorvastatin (LIPITOR) tablet 40 mg (has no administration in time range)   ondansetron (ZOFRAN) injection 4 mg (has no administration in time range)   aspirin tablet 325 mg (has no administration in time range)     Or   aspirin suppository 300 mg  (has no administration in time range)         ORDERS PLACED DURING THIS VISIT:  Orders Placed This Encounter   Procedures    CT Head Without Contrast Stroke Protocol    XR Chest 1 View    MRI Brain With & Without Contrast    MRI Cervical Spine With & Without Contrast    [place] Draw    aPTT    Comprehensive Metabolic Panel    Protime-INR    Single High Sensitivity Troponin T    CBC Auto Differential    Hemoglobin A1c    Lipid Panel    aPTT    Protime-INR    CBC (No Diff)    Comprehensive Metabolic Panel    NPO Diet NPO Type: Strict NPO    Initiate Department's Acute Stroke Process (Team D, Code 19, etc.)    Perform NIH Stroke Scale    Measure Actual Weight    Notify Provider    Notify Provider for SBP Greater Than 140 for Hemorrhagic Stroke Patient    Head of Bed 30 Degrees or Less    Undress and Gown    Vital Signs    Neuro Checks    No Hypotonic Fluids    Nursing Dysphagia Screening (Complete Prior to Giving anything PO)    RN to Place Order SLP Consult (IF swallow screen failed) - Eval & Treat Choosing Reason of RN Dysphagia Screen Failed    Vital Signs    Continuous Pulse Oximetry    Telemetry - Place Orders & Notify Provider of Results When Patient Experiences Acute Chest Pain, Dysrhythmia or Respiratory Distress    Notify Provider    Nursing Dysphagia Screening (Complete Prior to Giving Anything By Mouth)    RN to Place Order SLP Consult - Eval & Treat Choosing Reason of RN Dysphagia Screen Failed    Notify Provider if Patient Fails Dysphagia Screening    Intake and Output    Neuro Checks    NIHSS Assessment    Order CT Head Without Contrast for Neurological Decline    Provide Stroke Education Material    Saline Lock & Maintain IV Access    Activity As Tolerated    Place Sequential Compression Device    Maintain Sequential Compression Device    Code Status and Medical Interventions:    Inpatient Neurology Consult Stroke    Inpatient Neurology Consult Stroke    Inpatient Neurology Consult Stroke    OT Consult:  Eval & Treat    PT Consult: Eval & Treat As Tolerated    Oxygen Therapy- Nasal Cannula; 2 LPM    POC Glucose Once    POC Glucose Q6H    ECG 12 Lead Stroke Evaluation    Adult Transthoracic Echo Complete W/ Cont if Necessary Per Protocol (With Agitated Saline)    Type & Screen    Insert Large-Bore Peripheral IV - RIGHT AC Preferred    Insert Peripheral IV    Initiate ED Observation Status    Fall Precautions    Green Top (Gel)    Lavender Top    Gold Top - SST    Light Blue Top    CBC & Differential         OUTPATIENT MEDICATION MANAGEMENT:  Current Facility-Administered Medications Ordered in Epic   Medication Dose Route Frequency Provider Last Rate Last Admin    aspirin tablet 325 mg  325 mg Oral Daily Jr Mason MD        Or    aspirin suppository 300 mg  300 mg Rectal Daily Jr Mason MD        atorvastatin (LIPITOR) tablet 40 mg  40 mg Oral Nightly Jr Mason MD        midazolam (VERSED) injection 1 mg  1 mg Intravenous Q4H PRN Lex Garcia MD   1 mg at 05/22/24 1018    ondansetron (ZOFRAN) injection 4 mg  4 mg Intravenous Q6H PRN Jr Mason MD        sodium chloride 0.9 % flush 10 mL  10 mL Intravenous PRN Lex Garcia MD        sodium chloride 0.9 % flush 10 mL  10 mL Intravenous Q12H Jr Mason MD        sodium chloride 0.9 % flush 10 mL  10 mL Intravenous PRN Jr Mason MD        sodium chloride 0.9 % infusion 40 mL  40 mL Intravenous PRN Jr Mason MD         No current [facility]-ordered outpatient medications on file.         PROCEDURES  Procedures      Critical care provider statement:    Critical care time (minutes): 33.   Critical care time was exclusive of:  Separately billable procedures and treating other patients   Critical care was necessary to treat or prevent imminent or life-threatening deterioration of the following conditions:  CNS Failure   Critical care was time spent personally by me on the following activities:  Development of treatment plan with  patient or surrogate, discussions with consultants, evaluation of patient's response to treatment, examination of patient, obtaining history from patient or surrogate, ordering and performing treatments and interventions, ordering and review of laboratory studies, ordering and review of radiographic studies, pulse oximetry, re-evaluation of patient's condition and review of old charts. Critical Care indicators: Stroke       PROGRESS, DATA ANALYSIS, CONSULTS, AND MEDICAL DECISION MAKING  All labs have been independently interpreted by me.  All radiology studies have been reviewed by me. All EKG's have been independently viewed and interpreted by me.  Discussion below represents my analysis of pertinent findings related to patient's condition, differential diagnosis, treatment plan and final disposition.    Differential diagnosis includes but is not limited to CVA, TIA, MS, electrolyte abnormalities.    Clinical Scores:            Total (NIH Stroke Scale): 1      ED Course as of 05/22/24 1022   Wed May 22, 2024   0906 Discussed with Dr. Rodney who recommends plain head CT and stat MRI brain.  He states no need for team D evaluation. [MW]   0906 Last known well of 0600, initial NIH of 1 [MW]   0927 CT head interpreted by me demonstrates no evidence of intracranial hemorrhage [MW]   0942 EKG interpreted by me demonstrates sinus rhythm, rate of 70, no MS/QT prolongation, no ST elevation [MW]   1018 Laboratory evaluation is overall unremarkable.  Radiological evaluation does demonstrate no evidence of acute intracranial abnormality however there is a small area of irregularity on the CT head that could correlate to a subacute versus chronic appearing infarct versus demyelinating process.  Will further characterize with MRI brain with and without contrast.  Will proceed with admission to observation unit for further evaluation and management. [MW]   1019 Patient was not a TNK candidate due to nondisabling symptoms and  unclear etiology of symptoms. [MW]   1019 Discussed with Jany LUIS with [facility] who agrees to admit [MW]      ED Course User Index  [MW] Lex Garcia MD             AS OF 10:22 EDT VITALS:    BP - (!) 157/111  HR - 66  TEMP - 97.4 °F (36.3 °C)  O2 SATS - 99%    COMPLEXITY OF CARE  The patient requires admission.      DIAGNOSIS  Final diagnoses:   Stroke-like symptoms         DISPOSITION  ED Disposition       ED Disposition   Decision to Admit    Condition   --    Comment   --                Please note that portions of this document were completed with a voice recognition program.    Note Disclaimer: At [facility], we believe that sharing information builds trust and better relationships. You are receiving this note because you recently visited [facility]. It is possible you will see health information before a provider has talked with you about it. This kind of information can be easy to misunderstand. To help you fully understand what it means for your health, we urge you to discuss this note with your provider.         Lex Garcia MD  24 1022      Electronically signed by Lex Garcia MD at 24 1022       Fifi Beltrán RN at 24 0849          Patient to ed from home via PV with complaint of right hand and right foot weakness that started around 6am. States some improvement in weakness     Electronically signed by Fifi Beltrán RN at 24 0850          Physician Progress Notes (last 48 hours)        Rossana Pryor APRN at 24 1023          DOS: 2024  NAME: Linda Castano   : 1970  PCP: Provider, No Known  Chief Complaint   Patient presents with    Extremity Weakness     Stroke    Subjective: Right-sided symptoms have improved,  Thinks she notices a little bit of drift in her right upper extremity.  No changes in speech or vision.  Walking okay.   at bedside. Pt seen in follow up today, however the problem is new to the  "examiner.      Objective:  Vital signs: /75 (BP Location: Right arm, Patient Position: Lying)   Pulse 57   Temp 98.1 °F (36.7 °C) (Oral)   Resp 16   Ht 157.5 cm (62\")   Wt 99.5 kg (219 lb 6.4 oz)   SpO2 97%   BMI 40.13 kg/m²       General appearance: Well developed, well nourished, well groomed, alert and cooperative.   HEENT: Normocephalic.   Neck: Supple.   Cardiac: Regular rate and rhythm. No murmurs.   Chest Exam: Clear to auscultation bilaterally, no wheezes, no rhonchi.  Extremities: Normal, no edema.   Skin: No rashes or birthmarks.     Higher integrative function: Oriented to time, place, person, intact recent and remote memory, attention span, concentration and language. Spontaneous speech, fund of vocabulary are normal.   CN II: Normal visual fields.   CN III IV VI: Extraocular movements are full without nystagmus. Pupils are equal, round, and reactive to light.   CN V: Normal facial sensation.  CN VII: Facial movements are symmetric, no weakness.   CN VIII: Auditory acuity is normal.   CN IX & X: Symmetric palatal movement.   CN XI: Sternocleidomastoid and trapezius are normal. No weakness.   CN XII: The tongue is midline. No atrophy or fasciculations.   Motor: Normal muscle strength, bulk, and tone in upper and lower extremities except mild RUE drift.. No fasciculations, rigidity, spasticity or abnormal movements.   Sensation: Normal light touch in all extremities.   Station and gait: Deferred.  Muscle stretch reflexes: Reflexes are normal and symmetric in the upper and lower extremities.   Plantar reflexes are flexor bilaterally.   Coordination: Finger to nose test showed no dysmetria. Rapid alternating movements were normal. Heel to shin normal.     Scheduled Meds:aspirin, 325 mg, Oral, Daily   Or  aspirin, 300 mg, Rectal, Daily  atorvastatin, 80 mg, Oral, Nightly  sodium chloride, 10 mL, Intravenous, Q12H      Continuous Infusions:   PRN Meds:.  midazolam    ondansetron    sodium " chloride    sodium chloride    sodium chloride    Laboratory results:  Lab Results   Component Value Date    GLUCOSE 97 05/23/2024    CALCIUM 9.2 05/23/2024     05/23/2024    K 4.1 05/23/2024    CO2 23.8 05/23/2024     05/23/2024    BUN 18 05/23/2024    CREATININE 0.82 05/23/2024    EGFRIFAFRI >60 12/15/2020    BCR 22.0 05/23/2024    ANIONGAP 8.2 05/23/2024     Lab Results   Component Value Date    WBC 7.15 05/23/2024    HGB 11.8 (L) 05/23/2024    HCT 36.3 05/23/2024    MCV 84.8 05/23/2024     05/23/2024     Lab Results   Component Value Date    CHOL 250 (H) 05/22/2024     Lab Results   Component Value Date    HDL 60 05/22/2024     Lab Results   Component Value Date     (H) 05/22/2024     Lab Results   Component Value Date    TRIG 79 05/22/2024         Lab 05/22/24  0905   HEMOGLOBIN A1C 5.80*   ECG 5/22 tracings viewed by me, shows normal sinus rhythm  Review and interpretation of imaging:    Impression:  53-year-old female with history of hypertension following which she lost weight and was taken off of medication over 10 years ago who presented with complaints of acute onset right hand and right foot weakness.  In the ED /93.  TNK was not given due to minor and improving symptoms.  She was on no medications prior to arrival and has not seen a PCP since 2020.  She does not smoke or use illicit drugs and only drinks alcohol occasionally.    Workup:  MRI brain with and without contrast: Old right caudate/corona radiata/internal capsule stroke.  Acute left frontal parietal subcortical stroke extending into the corona radiata.  MRI brain images viewed by me, shows left subcortical stroke.    MRI cervical spine with and without:Mild cervical spondylosis noted with some degenerative changes, see report.  CTA head/neck: No significant stenosis, no occlusion, or aneurysm.  2D echo: EF 67.7%, normal left atrial size and volume, no valvular source of stroke.  Saline study done but no mention  "of results.  Labs: B12 445, TSH 4.1, XML775, hemoglobin A1c 5.80%, folate 7.63.    Diagnosis:   Left frontoparietal stroke, embolic appearing  History of hypertension  Hyperlipidemia    Plan:  Cardiology consulted for consideration of CLAYTON, if unremarkable would proceed with hypercoagulable testing and consideration of sleep study   mg daily started this admission  Lipitor 80 mg daily started this admission  Neurochecks  BP control-okay to normalize, continue to monitor  Stroke Education  GIANNA/SCDs  PT/OT/ST  D/W Dr Hewitt today. Will follow.       Electronically signed by Rossana Pryor APRN at 24 0749       Carlos Humphrey MD at 24 8911           LOS: 1 day     Name: Linda Castano  Age: 53 y.o.  Sex: female  :  1970  MRN: [id number]         Primary Care Physician: Provider, No Known    Subjective   Subjective  Reports complete resolution of numbness in her right foot.  This is also resolving in the right hand.  Asking if she can go home today.  No acute overnight events.    Objective   Vital Signs  Temp:  [97.7 °F (36.5 °C)-98.6 °F (37 °C)] 98.1 °F (36.7 °C)  Heart Rate:  [57-75] 57  Resp:  [16-18] 16  BP: (109-157)/() 134/75  Body mass index is 40.13 kg/m².    Objective:  General Appearance:  Comfortable and in no acute distress.    Vital signs: (most recent): Blood pressure 134/75, pulse 57, temperature 98.1 °F (36.7 °C), temperature source Oral, resp. rate 16, height 157.5 cm (62\"), weight 99.5 kg (219 lb 6.4 oz), SpO2 97%.    Lungs:  Normal effort and normal respiratory rate.  She is not in respiratory distress.  There are decreased breath sounds.    Heart: Normal rate.  Regular rhythm.    Abdomen: Abdomen is soft.  Bowel sounds are normal.   There is no abdominal tenderness.     Extremities: There is no dependent edema or local swelling.    Neurological: Patient is alert and oriented to person, place and time.    Skin:  Warm and dry.  "               Results Review:       I reviewed the patient's new clinical results.    Results from last 7 days   Lab Units 24  0536 24  0905   WBC 10*3/mm3 7.15 6.80   HEMOGLOBIN g/dL 11.8* 13.5   PLATELETS 10*3/mm3 275 300     Results from last 7 days   Lab Units 24  0536 24  0905   SODIUM mmol/L 138 139   POTASSIUM mmol/L 4.1 4.2   CHLORIDE mmol/L 106 103   CO2 mmol/L 23.8 22.9   BUN mg/dL 18 19   CREATININE mg/dL 0.82 0.89   CALCIUM mg/dL 9.2 9.5   GLUCOSE mg/dL 97 101*     Results from last 7 days   Lab Units 24  0905   INR  0.88*             Scheduled Meds:   aspirin, 325 mg, Oral, Daily   Or  aspirin, 300 mg, Rectal, Daily  atorvastatin, 80 mg, Oral, Nightly  sodium chloride, 10 mL, Intravenous, Q12H      PRN Meds:     midazolam    ondansetron    sodium chloride    sodium chloride    sodium chloride  Continuous Infusions:       Assessment & Plan   Active Hospital Problems    Diagnosis  POA    **Acute CVA (cerebrovascular accident) [I63.9]  Unknown    CVA (cerebral vascular accident) [I63.9]  Yes      Resolved Hospital Problems    Diagnosis Date Resolved POA    TIA (transient ischemic attack) [G45.9] 2024 Yes       Assessment & Plan    -She reports improvement of her symptoms.  CTAs negative.  Appears neurology has placed a consult for cardiology for consideration of CLAYTON  -On aspirin and high-dose statin  -Blood pressure well-controlled  -Working with therapy services    Dispo  To be determined    Expected discharge date/ time has not been documented.     Carlos Humphrey MD  [facility]  24  09:39 EDT      Electronically signed by Carlos Humphrey MD at 24 0941          Consult Notes (last 48 hours)        Juliana Holden MD at 24 2304          CONSULT NOTE    INTERNAL MEDICINE   [facility]       Patient Identification:  Name: Linda Castano  Age: 53 y.o.  Sex: female  :  1970  MRN:  [id number]             Date of Consultation:  05/22/24          Primary Care Physician: Provider, No Known                               Requesting Physician: dr thakkar  Reason for Consultation:assuming care, admission    Chief Complaint:  53 year old female presented to the emergency room with weakness of her right hand and foot which started earlier today; she said her hand and foot felt heavy; she denies change in speech or vision; no focal numbness or tingling; she has a past history of cervical radiculopathy; no fever or chills; she was sent to the observation unit and imaging was positive for cva and admission was requested;     History of Present Illness:   As above      Past Medical History:  Htn  hyperglycemia  Past Surgical History:  History reviewed. No pertinent surgical history.   Home Meds:  No medications prior to admission.     Current Meds:     Current Facility-Administered Medications:     aspirin tablet 325 mg, 325 mg, Oral, Daily, 325 mg at 05/22/24 1650 **OR** aspirin suppository 300 mg, 300 mg, Rectal, Daily, Jr Mason MD    atorvastatin (LIPITOR) tablet 80 mg, 80 mg, Oral, Nightly, Jr Mason MD, 80 mg at 05/22/24 2040    midazolam (VERSED) injection 1 mg, 1 mg, Intravenous, Q4H PRN, Jr Mason MD, 1 mg at 05/22/24 1018    ondansetron (ZOFRAN) injection 4 mg, 4 mg, Intravenous, Q6H PRN, Jr Mason MD    sodium chloride 0.9 % flush 10 mL, 10 mL, Intravenous, PRN, Jr Mason MD    sodium chloride 0.9 % flush 10 mL, 10 mL, Intravenous, Q12H, Jr Mason MD, 10 mL at 05/22/24 2040    sodium chloride 0.9 % flush 10 mL, 10 mL, Intravenous, PRN, Jr Mason MD    sodium chloride 0.9 % infusion 40 mL, 40 mL, Intravenous, PRN, Jr Mason MD  Allergies:  Allergies   Allergen Reactions    Sulfa Antibiotics Hives     Social History:   Social History     Socioeconomic History    Marital status:    Tobacco Use    Smoking status: Never    Smokeless tobacco:  "Never   Vaping Use    Vaping status: Never Used   Substance and Sexual Activity    Alcohol use: Yes     Comment: occasionally, 1x a month    Drug use: Never    Sexual activity: Defer     Family History:  Family History   Problem Relation Age of Onset    Hypertension Mother     Stroke Father     Hypertension Father     Hyperlipidemia Father           Review of Systems  See history of present illness and past medical history.  Patient denies headache, dizziness, syncope, falls, trauma, change in vision, change in hearing, change in taste, changes in weight, changes in appetite; no, epistaxis, hemoptysis, nausea, vomiting,hematemesis, diarrhea, constipation or hematochezia. Denies cold or heat intolerance, polydipsia, polyuria, polyphagia. Denies hematuria, pyuria, dysuria, hesitancy, frequency or urgency. Denies consumption of raw and under cooked meats foods or change in water source.  Denies fever, chills, sweats, night sweats.  Denies missing any routine medications. Remainder of ROS is negative.      Vitals:   /86 (BP Location: Right arm, Patient Position: Lying)   Pulse 70   Temp 98.6 °F (37 °C) (Oral)   Resp 16   Ht 157.5 cm (62\")   Wt 99.5 kg (219 lb 6.4 oz)   SpO2 97%   BMI 40.13 kg/m²   I/O:   Intake/Output Summary (Last 24 hours) at 5/22/2024 2304  Last data filed at 5/22/2024 1426  Gross per 24 hour   Intake 240 ml   Output --   Net 240 ml     Exam:  General Appearance:    Alert, cooperative, no distress, appears stated age   Head:    Normocephalic, without obvious abnormality, atraumatic   Eyes:    PERRL, conjunctivae/corneas clear, EOM's intact, both eyes   Ears:    Normal external ear canals, both ears   Nose:   Nares normal, septum midline, mucosa normal, no drainage    or sinus tenderness   Throat:   Lips, tongue, gums normal; oral mucosa pink and moist   Neck:   Supple, symmetrical, trachea midline, no adenopathy;     thyroid:  no enlargement/tenderness/nodules; no carotid    bruit or JVD "   Back:     Symmetric, no curvature, ROM normal, no CVA tenderness   Lungs:     Clear to auscultation bilaterally, respirations unlabored   Chest Wall:    No tenderness or deformity    Heart:    Regular rate and rhythm, S1 and S2 normal, no murmur, rub   or gallop   Abdomen:     Soft, nontender, bowel sounds active all four quadrants,     no masses, no hepatomegaly, no splenomegaly   Extremities:   Extremities normal, atraumatic, no cyanosis or edema                Data Review:  Labs in chart were reviewed.  WBC   Date Value Ref Range Status   05/22/2024 6.80 3.40 - 10.80 10*3/mm3 Final     Hemoglobin   Date Value Ref Range Status   05/22/2024 13.5 12.0 - 15.9 g/dL Final     Hematocrit   Date Value Ref Range Status   05/22/2024 40.5 34.0 - 46.6 % Final     Platelets   Date Value Ref Range Status   05/22/2024 300 140 - 450 10*3/mm3 Final     Sodium   Date Value Ref Range Status   05/22/2024 139 136 - 145 mmol/L Final     Potassium   Date Value Ref Range Status   05/22/2024 4.2 3.5 - 5.2 mmol/L Final     Chloride   Date Value Ref Range Status   05/22/2024 103 98 - 107 mmol/L Final     CO2   Date Value Ref Range Status   05/22/2024 22.9 22.0 - 29.0 mmol/L Final     BUN   Date Value Ref Range Status   05/22/2024 19 6 - 20 mg/dL Final     Creatinine   Date Value Ref Range Status   05/22/2024 0.89 0.57 - 1.00 mg/dL Final     Glucose   Date Value Ref Range Status   05/22/2024 101 (H) 65 - 99 mg/dL Final     Calcium   Date Value Ref Range Status   05/22/2024 9.5 8.6 - 10.5 mg/dL Final     Results from last 7 days   Lab Units 05/22/24  0905   TSH uIU/mL 4.100     Results from last 7 days   Lab Units 05/22/24  0905   HEMOGLOBIN A1C % 5.80*       Imaging Results (Last 7 Days)       Procedure Component Value Units Date/Time    CT Head Without Contrast Stroke Protocol [[id number]] Collected: 05/22/24 1013     Updated: 05/22/24 1715    Narrative:      EMERGENCY CT SCAN OF THE HEAD WITHOUT CONTRAST ON 05/22/2024     CLINICAL  HISTORY: Acute stroke suspected, patient has right-sided  weakness and dizziness.     TECHNIQUE: Spiral CT images were obtained from the base of the skull to  the vertex without intravenous contrast. The images were reformatted and  are submitted in 3 mm thick axial, sagittal and coronal CT sections with  brain algorithm.     COMPARISON: There are no prior CTs for comparison.     FINDINGS: There is a discrete ovoid area of low density extending from  the anterior body of the right caudate nucleus into the anterior limb of  the right internal capsule and measures 15 x 6 x 9 mm in anterior  posterior, medial lateral and craniocaudal dimension and is consistent  with a subacute or chronic lacunar infarct. The remainder of the brain  parenchyma is normal in attenuation. The ventricles are normal in size.  I see no focal mass effect. There is no midline shift. No extra-axial  fluid collections are identified. There is no evidence of acute  intracranial hemorrhage. The calvarium and skull base are normal in  appearance. The paranasal sinuses and the mastoid air cells and middle  ear cavities are clear. The orbits are normal in appearance.        Impression:      1. There is a discrete 15 x 6 x 9 mm ovoid area of low density extending  from the anterior body of the right caudate nucleus into the anterior  limb of the right internal capsule that most probably represents either  a late subacute or an old lacunar infarct. The remainder of the head CT  is within normal limits. The etiology of the patient's acute onset  right-sided weakness and dizziness is not established on this exam. The  results were communicated to CLARISSE Hewitt, the Stroke Neurologist, while  the patient was on our scanner on 05/22/2024 at 9:15 a.m. He informed me  that the patient is going to get an MRI of the brain and will get a  follow-up CT angiogram of the head and neck. I also communicated the  results to Dr. Garcia from the ER by telephone on  05/22/2024 at 9:30  a.m.     Radiation dose reduction techniques were utilized, including automated  exposure control and exposure modulation based on body size.        This report was finalized on 5/22/2024 5:11 PM by Dr. Carlos Starkey M.D  on Workstation: [id number]       CT Angiogram Head [[id number]] Resulted: 05/22/24 1645     Updated: 05/22/24 1646    CT Angiogram Neck [[id number]] Resulted: 05/22/24 1645     Updated: 05/22/24 1646    MRI Brain With & Without Contrast [[id number]] Collected: 05/22/24 1443     Updated: 05/22/24 1625    Narrative:      STAT MRI OF THE BRAIN WITH AND WITHOUT CONTRAST AND MRI OF THE CERVICAL  SPINE WITH AND WITHOUT CONTRAST ON 05/22/2024     CLINICAL HISTORY: Acute right hand and foot weakness that started around  6 a.m. this morning and has since resolved.     MRI OF THE BRAIN TECHNIQUE: Axial T1, FLAIR, fat-suppressed T2, axial  diffusion and gradient echo T2, sagittal T1 and postcontrast axial  fat-suppressed T1 and coronal T1-weighted images were obtained of the  entire head.     COMPARISON: There are no prior MRIs of the brain for comparison. This  study is correlated to a head CT earlier this morning on 05/22/2024 at  9:00 a.m.     FINDINGS: There is a discrete punched out area of encephalomalacia  extending from the body of the right caudate nucleus into the mid corona  radiata region and junction of the anterior limb and genu of the right  internal capsule compatible with an old lacunar infarct that maximally  measures 15 x 8 x 8 mm. There is a focal somewhat triangular-shaped area  of intense increased signal intensity on the diffusion weighted images  and low signal on the ADC maps within the superior left frontal parietal  subcortical white matter tracking inferiorly to near the posterior left  corona radiata region that measures 11 x 10 mm in size and this is  consistent with an area of cytotoxic edema and a small acute infarct.  The remainder of the brain parenchyma is  normal in signal intensity. The  ventricles are normal in size. I see no focal mass effect. There is no  midline shift. No extra-axial fluid collections are identified. No  abnormal areas of enhancement are seen in the head. The paranasal  sinuses and the mastoid air cells and the middle ear cavities are clear.  Good flow voids are demonstrated within the cerebral vessels and in the  dural venous sinuses. The calvarium and the skull base demonstrate  normal marrow signal intensity. The orbits are normal in appearance.       Impression:      1. There is a 15 x 8 x 8 mm old lacunar infarct extending from the body  of the right caudate nucleus into the mid right corona radiata region  and superior aspect of the genu of the right internal capsule.     2. There is a triangular area of acute infarction measuring 10 x 11 mm  in size extending from the superior left frontal parietal subcortical  white matter into the posterior left corona radiata region. The  remainder of the MRI of the brain is normal.     MRI OF THE CERVICAL SPINE TECHNIQUE: Sagittal T1, gradient echo T2 and  fat-suppressed fast spin-echo T2 and postcontrast sagittal  fat-suppressed T1-weighted images were obtained of the cervical spine.  In addition, axial gradient echo and fast spin echo T2 and postcontrast  axial T1-weighted images were obtained from the skull base to the T1  thoracic level.      FINDINGS: The craniocervical junction is normal in appearance. The  cervical cord and the upper thoracic spinal cord is normal in contour  and signal intensity.     At C2-3, the disc space, facets and uncovertebral joints are normal in  appearance with no canal or foraminal narrowing.     At C3-4, there is mild disc space narrowing, minimal degenerative  endplate changes, minimal posterior spurring with minimal if any canal  narrowing. There is some uncovertebral joint hypertrophy, the facets are  normal and there is essentially no foraminal narrowing.     At  C4-5, there is some disc space narrowing, degenerative endplate  changes, mild diffuse posterior disc osteophyte complex abuts the  ventral surface of the cord mildly narrowing the canal. The facets are  normal and there is some bilateral uncovertebral joint hypertrophy and  there is mild to moderate bilateral bony foraminal narrowing.     At C5-6, there is some disc space narrowing, degenerative endplate  changes, mild diffuse posterior disc osteophyte complex, minimal canal  narrowing. The facets are normal. There is some uncovertebral joint  hypertrophy and there is mild right and mild to moderate left bony  foraminal narrowing.     At C6-7, there is mild disc space narrowing, degenerative endplate  changes, minimal posterior spurring but no canal narrowing. The facets  are normal. There is mild uncovertebral joint hypertrophy, there is  essentially no foraminal narrowing.     At C7-T1, the posterior disc margin and facets are normal with no canal  or foraminal narrowing.     IMPRESSION:  1. There is mild cervical spondylosis as described. There is some disc  space narrowing and degenerative endplate changes from C3 to C7 and  posterior spurs minimally narrow the canal at C3-4. Posterior spurs  indent the ventral thecal sac and abut the ventral cord mildly narrowing  the canal at C4-5 and posterior spurs minimally indent the ventral  thecal sac resulting in minimal if any canal narrowing at C5-6 and C6-7.  No cervical disc herniation is identified and no additional cervical  canal narrowing is identified. There is some uncovertebral joint  spurring into the neural foramen on resulting in minimal foraminal  narrowing at C3-4 up to mild to moderate bilateral bony foraminal  narrowing at C4-5 and mild right and mild to moderate left bony  foraminal narrowing at C5-6. The remainder of the cervical spine MRI is  unremarkable. The results of this study were communicated to Dr. Hewitt from Stroke Neurology by  telephone on 05/22/2024 at 2:00 p.m.     This report was finalized on 5/22/2024 4:22 PM by Dr. Carlos Starkey M.D  on Workstation: [id number]       MRI Cervical Spine With & Without Contrast [[id number]] Collected: 05/22/24 1443     Updated: 05/22/24 1625    Narrative:      STAT MRI OF THE BRAIN WITH AND WITHOUT CONTRAST AND MRI OF THE CERVICAL  SPINE WITH AND WITHOUT CONTRAST ON 05/22/2024     CLINICAL HISTORY: Acute right hand and foot weakness that started around  6 a.m. this morning and has since resolved.     MRI OF THE BRAIN TECHNIQUE: Axial T1, FLAIR, fat-suppressed T2, axial  diffusion and gradient echo T2, sagittal T1 and postcontrast axial  fat-suppressed T1 and coronal T1-weighted images were obtained of the  entire head.     COMPARISON: There are no prior MRIs of the brain for comparison. This  study is correlated to a head CT earlier this morning on 05/22/2024 at  9:00 a.m.     FINDINGS: There is a discrete punched out area of encephalomalacia  extending from the body of the right caudate nucleus into the mid corona  radiata region and junction of the anterior limb and genu of the right  internal capsule compatible with an old lacunar infarct that maximally  measures 15 x 8 x 8 mm. There is a focal somewhat triangular-shaped area  of intense increased signal intensity on the diffusion weighted images  and low signal on the ADC maps within the superior left frontal parietal  subcortical white matter tracking inferiorly to near the posterior left  corona radiata region that measures 11 x 10 mm in size and this is  consistent with an area of cytotoxic edema and a small acute infarct.  The remainder of the brain parenchyma is normal in signal intensity. The  ventricles are normal in size. I see no focal mass effect. There is no  midline shift. No extra-axial fluid collections are identified. No  abnormal areas of enhancement are seen in the head. The paranasal  sinuses and the mastoid air cells and the  middle ear cavities are clear.  Good flow voids are demonstrated within the cerebral vessels and in the  dural venous sinuses. The calvarium and the skull base demonstrate  normal marrow signal intensity. The orbits are normal in appearance.       Impression:      1. There is a 15 x 8 x 8 mm old lacunar infarct extending from the body  of the right caudate nucleus into the mid right corona radiata region  and superior aspect of the genu of the right internal capsule.     2. There is a triangular area of acute infarction measuring 10 x 11 mm  in size extending from the superior left frontal parietal subcortical  white matter into the posterior left corona radiata region. The  remainder of the MRI of the brain is normal.     MRI OF THE CERVICAL SPINE TECHNIQUE: Sagittal T1, gradient echo T2 and  fat-suppressed fast spin-echo T2 and postcontrast sagittal  fat-suppressed T1-weighted images were obtained of the cervical spine.  In addition, axial gradient echo and fast spin echo T2 and postcontrast  axial T1-weighted images were obtained from the skull base to the T1  thoracic level.      FINDINGS: The craniocervical junction is normal in appearance. The  cervical cord and the upper thoracic spinal cord is normal in contour  and signal intensity.     At C2-3, the disc space, facets and uncovertebral joints are normal in  appearance with no canal or foraminal narrowing.     At C3-4, there is mild disc space narrowing, minimal degenerative  endplate changes, minimal posterior spurring with minimal if any canal  narrowing. There is some uncovertebral joint hypertrophy, the facets are  normal and there is essentially no foraminal narrowing.     At C4-5, there is some disc space narrowing, degenerative endplate  changes, mild diffuse posterior disc osteophyte complex abuts the  ventral surface of the cord mildly narrowing the canal. The facets are  normal and there is some bilateral uncovertebral joint hypertrophy and  there  is mild to moderate bilateral bony foraminal narrowing.     At C5-6, there is some disc space narrowing, degenerative endplate  changes, mild diffuse posterior disc osteophyte complex, minimal canal  narrowing. The facets are normal. There is some uncovertebral joint  hypertrophy and there is mild right and mild to moderate left bony  foraminal narrowing.     At C6-7, there is mild disc space narrowing, degenerative endplate  changes, minimal posterior spurring but no canal narrowing. The facets  are normal. There is mild uncovertebral joint hypertrophy, there is  essentially no foraminal narrowing.     At C7-T1, the posterior disc margin and facets are normal with no canal  or foraminal narrowing.     IMPRESSION:  1. There is mild cervical spondylosis as described. There is some disc  space narrowing and degenerative endplate changes from C3 to C7 and  posterior spurs minimally narrow the canal at C3-4. Posterior spurs  indent the ventral thecal sac and abut the ventral cord mildly narrowing  the canal at C4-5 and posterior spurs minimally indent the ventral  thecal sac resulting in minimal if any canal narrowing at C5-6 and C6-7.  No cervical disc herniation is identified and no additional cervical  canal narrowing is identified. There is some uncovertebral joint  spurring into the neural foramen on resulting in minimal foraminal  narrowing at C3-4 up to mild to moderate bilateral bony foraminal  narrowing at C4-5 and mild right and mild to moderate left bony  foraminal narrowing at C5-6. The remainder of the cervical spine MRI is  unremarkable. The results of this study were communicated to Dr. Hewitt from Stroke Neurology by telephone on 05/22/2024 at 2:00 p.m.     This report was finalized on 5/22/2024 4:22 PM by Dr. Carlos Starkey M.D  on Workstation: [id number]       XR Chest 1 View [[id number]] Collected: 05/22/24 1031     Updated: 05/22/24 1036    Narrative:      XR CHEST 1 VW-     INDICATION: Acute  stroke protocol     COMPARISON: 2/8/2015       Impression:      No focal consolidation. No pleural effusion or pneumothorax.   Normal size cardiomediastinal silhouette.  No focal osseous  abnormality.       This report was finalized on 5/22/2024 10:33 AM by Dr. Lex James M.D on Workstation: [id number]             History reviewed. No pertinent past medical history.    Assessment:  Active Hospital Problems    Diagnosis  POA    **Acute CVA (cerebrovascular accident) [I63.9]  Unknown    CVA (cerebral vascular accident) [I63.9]  Yes      Resolved Hospital Problems    Diagnosis Date Resolved POA    TIA (transient ischemic attack) [G45.9] 05/22/2024 Yes   Hypertension  hyperglycemia    Plan:  Will continue stroke workup  Trend labs  Monitor on telemetry  Dw patient and provider from the observation unit    Juliana Holden MD   5/22/2024  23:04 EDT      Electronically signed by Juliana Holden MD at 05/22/24 2309       Eliz Mcgrath PA-C at 05/22/24 1040        Consult Orders    1. Inpatient Neurology Consult Stroke [[id number]] ordered by Jr Mason MD at 05/22/24 1021    2. Inpatient Neurology Consult Stroke [[id number]] ordered by Lex Garcia MD at 05/22/24 0906    3. Inpatient Neurology Consult Stroke [[id number]] ordered by Lex Garcia MD at 05/22/24 0906              Attestation signed by Reuben Hewitt MD at 05/22/24 1548    I have reviewed this documentation and agree with the following additions. CTA head and neck added, continue to monitor on telemetry.                  Neurology Consult Note    Consult Date: 5/22/2024    Referring MD: Lex Garcia MD    Reason for Consult I have been asked to see the patient in neurological consultation to render advice and opinion regarding right hand and right foot weakness.    Linda Castano is a 53 y.o. female with no significant PMH and family history of father with stroke presents to the ED with right hand and  right foot weakness since 6 am this morning. Patient states she was up washing her hands and she tried to step with her right foot towards the shower and said if felt heavy. She also noticed after showering that when she brushed her hair her right hand also felt heavy. Patient states that symptoms are gradually improving more so in her foot than her hand. She denies issues walking or imbalance. She did not appreciate any more proximal weakness. She denies numbness/tingling, speech or visual deficit. She denies dizziness. Patient admits to neck pain and states she has been told she has degenerative changes in her neck before. She also states she has intermittent numbness and tingling in both hands in more noticeable in her first 3 fingers. She does not have any shooting pain or numbness when turning head, coughing, sneezing or bearing down. She does not complain of urinary/bowel incontinence or saddle anesthesia. She denies recent head trauma or illness, or previous history of stroke or heart attack, or heart arrhythmia. BP on arrival was 175/93 mmHg and pulse was 78. Blood glucose was 101. LDL is 177. She is on no medications on arrival and states she has not seen a PCP since 2020.    Past Medical/Surgical Hx:  History reviewed. No pertinent past medical history.  History reviewed. No pertinent surgical history.    Medications On Admission  (Not in a hospital admission)      Allergies:  Allergies   Allergen Reactions    Sulfa Antibiotics Hives       Social Hx:  Social History     Socioeconomic History    Marital status:    Tobacco Use    Smoking status: Never    Smokeless tobacco: Never   Vaping Use    Vaping status: Never Used   Substance and Sexual Activity    Alcohol use: Yes     Comment: occasionally, 1x a month    Drug use: Not Currently    Sexual activity: Defer       Family Hx:  Family History   Problem Relation Age of Onset    Hypertension Mother     Stroke Father     Hypertension Father      "Hyperlipidemia Father        Exam    BP (!) 157/111   Pulse 66   Temp 97.4 °F (36.3 °C)   Resp 18   Ht 157.5 cm (62\")   Wt 99.2 kg (218 lb 9.6 oz)   SpO2 99%   BMI 39.98 kg/m²   gen: NAD, vitals reviewed  MS: oriented x3, recent/remote memory intact, normal attention/concentration, language intact, no neglect, normal fund of knowledge  CN: visual acuity grossly normal, visual fields full, PERRL, EOMI, facial sensation equal, no facial droop, hearing symmetric, palate elevates symmetrically, shoulder shrug equal, tongue midline  Motor: 5/5 throughout upper and lower extremities, subjective weakness in right hand and foot, normal tone  Sensation: intact to cold temperature and vibration throughout, negative tinel's, positive phalen's on the right   Reflexes: 2+ bilateral brachioradialis, biceps, 0 triceps, 1+ patellar, 1+ ankle, plantars flat in right and downgoing in left, child's slightly positive, no clonus  Coordination: no dysmetria with finger to nose bilaterally  Gait: no ataxia, normal station    DATA:    Lab Results   Component Value Date    GLUCOSE 101 (H) 05/22/2024    CALCIUM 9.5 05/22/2024     05/22/2024    K 4.2 05/22/2024    CO2 22.9 05/22/2024     05/22/2024    BUN 19 05/22/2024    CREATININE 0.89 05/22/2024    EGFRIFAFRI >60 12/15/2020    BCR 21.3 05/22/2024    ANIONGAP 13.1 05/22/2024     Lab Results   Component Value Date    WBC 6.80 05/22/2024    HGB 13.5 05/22/2024    HCT 40.5 05/22/2024    MCV 83.5 05/22/2024     05/22/2024     Lab Results   Component Value Date     (H) 05/22/2024     No results found for: \"HGBA1C\"  Lab Results   Component Value Date    INR 0.88 (L) 05/22/2024    INR 1.0 02/08/2015    PROTIME 12.2 05/22/2024    PROTIME 11.4 02/08/2015       Lab review:   CMP: Glucose 101  Lipid panel: Total cholesterol 250, LDL: 177  CBC with diff: WNL    Imaging review:   CT head without contrast:  FINDINGS: There is a discrete ovoid area of low density " extending from  the anterior body of the right caudate nucleus into the anterior limb of  the right internal capsule and measures 15 x 6 x 9 mm in anterior  posterior, medial lateral and craniocaudal dimension and is consistent  with a subacute or chronic lacunar infarct. The remainder of the brain  parenchyma is normal in attenuation. The ventricles are normal in size.  I see no focal mass effect. There is no midline shift. No extra-axial  fluid collections are identified. There is no evidence of acute  intracranial hemorrhage. The calvarium and skull base are normal in  appearance. The paranasal sinuses and the mastoid air cells and middle  ear cavities are clear. The orbits are normal in appearance.   IMPRESSION:  1. There is a discrete 15 x 6 x 9 mm ovoid area of low density extending  from the anterior body of the right caudate nucleus into the anterior  limb of the right internal capsule that most probably represents either  a late subacute or old lacunar infarct, I think it is unlikely to be  demyelinating in origin. The remainder of the head CT is within normal  limits. The etiology of the patient's acute onset right-sided weakness  and dizziness is not established on this exam. The results were  communicated to CLARISSE Hewitt, the Stroke Neurologist, while the patient  was on our scanner on 05/22/2024 at 9:15 a.m. He informed me that the  patient is going to get an MRI of the brain and will get a follow-up CT  angiogram of the head and neck. I also communicated the results to Dr. Garcia from the ER by telephone on 05/22/2024 at 9:30 a.m.    CXR:  No focal consolidation. No pleural effusion or pneumothorax.   Normal size cardiomediastinal silhouette.  No focal osseous  abnormality.      EKG:  Sinus rhythm  Probable left atrial enlargement  Low voltage, precordial leads    Diagnoses:  Right hand and right foot weakness: CT head demonstrates a discrete ovoid area in right caudate nucleus extending in to the  right internal capsule most likely a chronic to subacute infarct but would not account for patient's clinical symptoms. Demyelinating process felt to be unlikely. However, Brain with and without contrast ordered as well as cervical MRI with and without.   Intermittent numbness and tingling in bilateral hands more notable in first 3 fingers with positive phalen on right  Chronic neck pain: with no recent head or neck injury, no red flag signs  Elevated LDL: 177    NIHSS: 0  Pre-stroke mRS: 0    PLAN:   Brain MRI with and without  Cervical MRI with and without  TTE  Pt was not a candidate for tPA given due to mild non-disabling symptoms. Discussed with patient and she is in agreement.   Give aspirin now; if cannot swallow give CO  Lipitor 80 mg  Maintain permissive HTN for 24-48hrs, do not treat unless BP > 220/120 if no contraindication  Perform bedside swallow test  Telemetry to monitor for arrhythmia  VTE prophylaxis  Neuro checks, if change in exam call neuro oncall  PT/OT when appropriate   TSH, B12, folate    Recommendations discussed with Dr. Hewitt and he is in agreement with plan.    Electronically signed by Reuben Hewitt MD at 05/22/24 5721

## 2024-05-23 NOTE — PROGRESS NOTES
"DOS: 2024  NAME: Linda Castano   : 1970  PCP: Provider, No Known  Chief Complaint   Patient presents with    Extremity Weakness     Stroke    Subjective: Right-sided symptoms have improved,  Thinks she notices a little bit of drift in her right upper extremity.  No changes in speech or vision.  Walking okay.   at bedside. Pt seen in follow up today, however the problem is new to the examiner.      Objective:  Vital signs: /75 (BP Location: Right arm, Patient Position: Lying)   Pulse 57   Temp 98.1 °F (36.7 °C) (Oral)   Resp 16   Ht 157.5 cm (62\")   Wt 99.5 kg (219 lb 6.4 oz)   SpO2 97%   BMI 40.13 kg/m²       General appearance: Well developed, well nourished, well groomed, alert and cooperative.   HEENT: Normocephalic.   Neck: Supple.   Cardiac: Regular rate and rhythm. No murmurs.   Chest Exam: Clear to auscultation bilaterally, no wheezes, no rhonchi.  Extremities: Normal, no edema.   Skin: No rashes or birthmarks.     Higher integrative function: Oriented to time, place, person, intact recent and remote memory, attention span, concentration and language. Spontaneous speech, fund of vocabulary are normal.   CN II: Normal visual fields.   CN III IV VI: Extraocular movements are full without nystagmus. Pupils are equal, round, and reactive to light.   CN V: Normal facial sensation.  CN VII: Facial movements are symmetric, no weakness.   CN VIII: Auditory acuity is normal.   CN IX & X: Symmetric palatal movement.   CN XI: Sternocleidomastoid and trapezius are normal. No weakness.   CN XII: The tongue is midline. No atrophy or fasciculations.   Motor: Normal muscle strength, bulk, and tone in upper and lower extremities except mild RUE drift.. No fasciculations, rigidity, spasticity or abnormal movements.   Sensation: Normal light touch in all extremities.   Station and gait: Deferred.  Muscle stretch reflexes: Reflexes are normal and symmetric in the upper and lower extremities. "   Plantar reflexes are flexor bilaterally.   Coordination: Finger to nose test showed no dysmetria. Rapid alternating movements were normal. Heel to shin normal.     Scheduled Meds:aspirin, 325 mg, Oral, Daily   Or  aspirin, 300 mg, Rectal, Daily  atorvastatin, 80 mg, Oral, Nightly  sodium chloride, 10 mL, Intravenous, Q12H      Continuous Infusions:   PRN Meds:.  midazolam    ondansetron    sodium chloride    sodium chloride    sodium chloride    Laboratory results:  Lab Results   Component Value Date    GLUCOSE 97 05/23/2024    CALCIUM 9.2 05/23/2024     05/23/2024    K 4.1 05/23/2024    CO2 23.8 05/23/2024     05/23/2024    BUN 18 05/23/2024    CREATININE 0.82 05/23/2024    EGFRIFAFRI >60 12/15/2020    BCR 22.0 05/23/2024    ANIONGAP 8.2 05/23/2024     Lab Results   Component Value Date    WBC 7.15 05/23/2024    HGB 11.8 (L) 05/23/2024    HCT 36.3 05/23/2024    MCV 84.8 05/23/2024     05/23/2024     Lab Results   Component Value Date    CHOL 250 (H) 05/22/2024     Lab Results   Component Value Date    HDL 60 05/22/2024     Lab Results   Component Value Date     (H) 05/22/2024     Lab Results   Component Value Date    TRIG 79 05/22/2024         Lab 05/22/24  0905   HEMOGLOBIN A1C 5.80*   ECG 5/22 tracings viewed by me, shows normal sinus rhythm  Review and interpretation of imaging:    Impression:  53-year-old female with history of hypertension following which she lost weight and was taken off of medication over 10 years ago who presented with complaints of acute onset right hand and right foot weakness.  In the ED /93.  TNK was not given due to minor and improving symptoms.  She was on no medications prior to arrival and has not seen a PCP since 2020.  She does not smoke or use illicit drugs and only drinks alcohol occasionally.    Workup:  MRI brain with and without contrast: Old right caudate/corona radiata/internal capsule stroke.  Acute left frontal parietal subcortical stroke  extending into the corona radiata.  MRI brain images viewed by me, shows left subcortical stroke.    MRI cervical spine with and without:Mild cervical spondylosis noted with some degenerative changes, see report.  CTA head/neck: No significant stenosis, no occlusion, or aneurysm.  2D echo: EF 67.7%, normal left atrial size and volume, no valvular source of stroke.  Saline study done but no mention of results.  Labs: B12 445, TSH 4.1, BNC040, hemoglobin A1c 5.80%, folate 7.63.    Diagnosis:   Left frontoparietal stroke, embolic appearing  History of hypertension  Hyperlipidemia    Plan:  Cardiology consulted for consideration of CLAYTON, if unremarkable would proceed with hypercoagulable testing and consideration of sleep study   mg daily started this admission  Lipitor 80 mg daily started this admission  Neurochecks  BP control-okay to normalize, continue to monitor  Stroke Education  GIANNA/SCDs  PT/OT/ST  D/W Dr Hewitt today. Will follow.

## 2024-05-23 NOTE — PROGRESS NOTES
" LOS: 1 day     Name: Linda Castano  Age: 53 y.o.  Sex: female  :  1970  MRN: [id number]         Primary Care Physician: Provider, No Known    Subjective   Subjective  Reports complete resolution of numbness in her right foot.  This is also resolving in the right hand.  Asking if she can go home today.  No acute overnight events.    Objective   Vital Signs  Temp:  [97.7 °F (36.5 °C)-98.6 °F (37 °C)] 98.1 °F (36.7 °C)  Heart Rate:  [57-75] 57  Resp:  [16-18] 16  BP: (109-157)/() 134/75  Body mass index is 40.13 kg/m².    Objective:  General Appearance:  Comfortable and in no acute distress.    Vital signs: (most recent): Blood pressure 134/75, pulse 57, temperature 98.1 °F (36.7 °C), temperature source Oral, resp. rate 16, height 157.5 cm (62\"), weight 99.5 kg (219 lb 6.4 oz), SpO2 97%.    Lungs:  Normal effort and normal respiratory rate.  She is not in respiratory distress.  There are decreased breath sounds.    Heart: Normal rate.  Regular rhythm.    Abdomen: Abdomen is soft.  Bowel sounds are normal.   There is no abdominal tenderness.     Extremities: There is no dependent edema or local swelling.    Neurological: Patient is alert and oriented to person, place and time.    Skin:  Warm and dry.                Results Review:       I reviewed the patient's new clinical results.    Results from last 7 days   Lab Units 24  0536 24  0905   WBC 10*3/mm3 7.15 6.80   HEMOGLOBIN g/dL 11.8* 13.5   PLATELETS 10*3/mm3 275 300     Results from last 7 days   Lab Units 24  0536 24  0905   SODIUM mmol/L 138 139   POTASSIUM mmol/L 4.1 4.2   CHLORIDE mmol/L 106 103   CO2 mmol/L 23.8 22.9   BUN mg/dL 18 19   CREATININE mg/dL 0.82 0.89   CALCIUM mg/dL 9.2 9.5   GLUCOSE mg/dL 97 101*     Results from last 7 days   Lab Units 24  0905   INR  0.88*             Scheduled Meds:   aspirin, 325 mg, Oral, Daily   Or  aspirin, 300 mg, Rectal, Daily  atorvastatin, 80 mg, Oral, Nightly  sodium " chloride, 10 mL, Intravenous, Q12H      PRN Meds:     midazolam    ondansetron    sodium chloride    sodium chloride    sodium chloride  Continuous Infusions:       Assessment & Plan   Active Hospital Problems    Diagnosis  POA    **Acute CVA (cerebrovascular accident) [I63.9]  Unknown    CVA (cerebral vascular accident) [I63.9]  Yes      Resolved Hospital Problems    Diagnosis Date Resolved POA    TIA (transient ischemic attack) [G45.9] 05/22/2024 Yes       Assessment & Plan    -She reports improvement of her symptoms.  CTAs negative.  Appears neurology has placed a consult for cardiology for consideration of CLAYTON  -On aspirin and high-dose statin  -Blood pressure well-controlled  -Working with therapy services    Dispo  To be determined    Expected discharge date/ time has not been documented.     Carlos Humphrey MD  [facility]  05/23/24  09:39 EDT

## 2024-05-23 NOTE — CONSULTS
Date of Hospital Visit: 24  Encounter Provider: Christine Cervantes RN  Place of Service: [facility] CARDIOLOGY  Patient Name: Linda Castano  :1970  Referral Provider: Lex Garcia MD    Chief complaint: right sided weakness.    History of Present Illness    Ms. Castano is a 54 yo woman with a previous history of HTN, although she had lost weight in the past and was able to come off medications.  She has not seen a care provider in almost 4 years.    She presented yesterday with right-sided weakness.  She was found to have an acute left frontal parietal subcortical stroke with evidence of an old right internal capsule stroke.  She did not require lytics as her symptoms have improved on their own.  Her blood pressure was 175/93 mmHg upon arrival.  Neurology recommended permissive hypertension initially, and her systolic pressure is now in the 130s.  This is on no antihypertensive medications.    She is in sinus rhythm.  Her total cholesterol 258, HDL 60, .  Her A1c is 5.8%.  She is not on any hormones at home although she has been on them in the past, several years ago, and was thinking about restarting them in the future for postmenopausal symptoms.  A TTE has been unremarkable, as has large vessel imaging.    Her father had a stroke in his 70s.  She denies any family history of clotting disorders.  She has not been on any recent plane trips or long car rides.  She does not have any leg swelling or redness or pain or warmth.      ECHO 2024    Left ventricular systolic function is normal. Calculated left ventricular EF = 67.7%    Left ventricular diastolic function was normal.    Estimated right ventricular systolic pressure from tricuspid regurgitation is normal (<35 mmHg). Calculated right ventricular systolic pressure from tricuspid regurgitation is 17 mmHg.    History reviewed. No pertinent past medical history.    History reviewed. No pertinent  "surgical history.    Prior to Admission medications    Not on File       Social History     Socioeconomic History    Marital status:    Tobacco Use    Smoking status: Never    Smokeless tobacco: Never   Vaping Use    Vaping status: Never Used   Substance and Sexual Activity    Alcohol use: Yes     Comment: occasionally, 1x a month    Drug use: Never    Sexual activity: Defer       Family History   Problem Relation Age of Onset    Hypertension Mother     Stroke Father     Hypertension Father     Hyperlipidemia Father        Review of Systems   Neurological:         As per HPI   All other systems reviewed and are negative.       Objective:     Vitals:    05/22/24 2100 05/22/24 2325 05/23/24 0555 05/23/24 0735   BP: 137/86 109/46 131/80 134/75   BP Location: Right arm Right arm Right arm Right arm   Patient Position: Lying Lying Sitting Lying   Pulse: 70 75 57    Resp: 16 16 16 16   Temp: 98.6 °F (37 °C) 98.2 °F (36.8 °C) 97.7 °F (36.5 °C) 98.1 °F (36.7 °C)   TempSrc: Oral Oral Oral Oral   SpO2: 97% 96% 97%    Weight:       Height:         Body mass index is 40.13 kg/m².    Last Weight and Admission Weight        05/22/24  1400   Weight: 99.5 kg (219 lb 6.4 oz)     Flowsheet Rows      Flowsheet Row First Filed Value   Admission Height 157.5 cm (62\") Documented at 05/22/2024 0800   Admission Weight 98 kg (216 lb) Documented at 05/22/2024 0800              Intake/Output Summary (Last 24 hours) at 5/23/2024 0808  Last data filed at 5/22/2024 1426  Gross per 24 hour   Intake 240 ml   Output --   Net 240 ml         Physical Exam  Vitals reviewed.   Constitutional:       Appearance: She is well-developed.   HENT:      Head: Normocephalic.      Nose: Nose normal.      Mouth/Throat:      Pharynx: Oropharynx is clear.   Eyes:      Conjunctiva/sclera: Conjunctivae normal.   Neck:      Vascular: No JVD.   Cardiovascular:      Rate and Rhythm: Normal rate and regular rhythm.      Pulses: Normal pulses.      Heart sounds: " Normal heart sounds.   Pulmonary:      Effort: Pulmonary effort is normal.      Breath sounds: Normal breath sounds.   Abdominal:      Palpations: Abdomen is soft.      Tenderness: There is no abdominal tenderness.   Musculoskeletal:         General: No swelling. Normal range of motion.      Cervical back: Normal range of motion.   Skin:     General: Skin is warm and dry.   Neurological:      General: No focal deficit present.      Mental Status: She is alert.   Psychiatric:         Behavior: Behavior normal.         Thought Content: Thought content normal.         Judgment: Judgment normal.         Lab Review:                Results from last 7 days   Lab Units 05/23/24  0536   SODIUM mmol/L 138   POTASSIUM mmol/L 4.1   CHLORIDE mmol/L 106   CO2 mmol/L 23.8   BUN mg/dL 18   CREATININE mg/dL 0.82   GLUCOSE mg/dL 97   CALCIUM mg/dL 9.2     Results from last 7 days   Lab Units 05/22/24  0905   HSTROP T ng/L <6     Results from last 7 days   Lab Units 05/23/24  0536   WBC 10*3/mm3 7.15   HEMOGLOBIN g/dL 11.8*   HEMATOCRIT % 36.3   PLATELETS 10*3/mm3 275     Results from last 7 days   Lab Units 05/22/24  0905   INR  0.88*   APTT seconds 31.2     Results from last 7 days   Lab Units 05/22/24  0905   CHOLESTEROL mg/dL 250*         Results from last 7 days   Lab Units 05/22/24  0905   CHOLESTEROL mg/dL 250*   TRIGLYCERIDES mg/dL 79   HDL CHOL mg/dL 60   LDL CHOL mg/dL 177*           I personally viewed and interpreted the patient's EKG/Telemetry data    Assessment/Plan:     1. Acute stroke    She has suffered a left frontal parietal stroke of unclear etiology.  Her vessel imaging is normal.  She has no personal or family history of clotting disorders and denies any recent long trips or pain in her legs.  She is not on any hormones at home, and I told her that it would not be recommended in the future that she take estrogen-containing hormones given her stroke.  She may require treatment for hypertension going forward,  although I think it is too soon to tell.  She will definitely need a sleep apnea evaluation as an outpatient, and will likely require a hypercoagulable panel.  Her echo is normal so we will proceed with a CLAYTON tomorrow as well as outpatient heart rhythm monitoring.  She may ultimately need a loop recorder.    She denies any dental problems, trouble swallowing, or previous surgeries on her stomach or esophagus.  She has previously had an EGD with no issue.  CLAYTON will be low risk.

## 2024-05-23 NOTE — PLAN OF CARE
Goal Outcome Evaluation:  Plan of Care Reviewed With: patient, spouse           Outcome Evaluation: 52yo pleasant white female admitted 5/22/24 with stroke like symptoms, R hand and foot numbness. PMH Includes cervical radiculopathy. PLOF: Pt lives at home with spouse with 5 EDVIN. She was independent with ADLs and IADLs including driving and taking care of her grandchild. Today, she was found resting in bed,  present and helpful. She sat EOB independently and was able to don her shoes independently. She stood from EOB independently without AD. Pt amb 230' with SBA without AD on level surfaces with good pace and balance. She negotiated 12 stairs with handrail using step-over-step gt pattern and SBA. She returned to recliner chair with all needs met. She is safe to return home with spouse. She does not appear to need skilled PT services at this time - will sign off.      Anticipated Discharge Disposition (PT): home

## 2024-05-23 NOTE — THERAPY DISCHARGE NOTE
Patient Name: Linda Castano  : 1970    MRN: [id number]                              Today's Date: 2024       Admit Date: 2024    Visit Dx:     ICD-10-CM ICD-9-CM   1. Stroke-like symptoms  R29.90 781.99     Patient Active Problem List   Diagnosis    Acute CVA (cerebrovascular accident)    CVA (cerebral vascular accident)     History reviewed. No pertinent past medical history.  History reviewed. No pertinent surgical history.   General Information       Row Name 24 1031          Physical Therapy Time and Intention    Document Type discharge evaluation/summary  -DJ     Mode of Treatment individual therapy;physical therapy  -DJ       Row Name 24 1031          General Information    Patient Profile Reviewed yes  -DJ     Prior Level of Function independent:;driving;ADL's;community mobility  -DJ     Existing Precautions/Restrictions no known precautions/restrictions  -DJ     Barriers to Rehab none identified  -DJ       Row Name 24 1031          Living Environment    People in Home spouse  -DJ       Row Name 24 1031          Home Main Entrance    Number of Stairs, Main Entrance five  -DJ       Row Name 24 1031          Stairs Within Home, Primary    Stairs, Within Home, Primary few  -DJ       Row Name 24 1031          Cognition    Orientation Status (Cognition) oriented x 4  -DJ       Row Name 24 1031          Safety Issues, Functional Mobility    Comment, Safety Issues/Impairments (Mobility) gt  belt, rubber soled shoes  -DJ               User Key  (r) = Recorded By, (t) = Taken By, (c) = Cosigned By      Initials Name Provider Type    Elma Ingram, PT Physical Therapist                   Mobility       Row Name 24 1032          Bed Mobility    Bed Mobility supine-sit  -DJ     Supine-Sit Vega (Bed Mobility) independent  -DJ     Sit-Supine Vega (Bed Mobility) not tested  -DJ     Assistive Device (Bed Mobility) head of bed elevated  -DJ        Row Name 05/23/24 1032          Transfers    Comment, (Transfers) sit/stand from EOB  -DJ       Row Name 05/23/24 1032          Bed-Chair Transfer    Bed-Chair Bryan (Transfers) not tested  -DJ       Row Name 05/23/24 1032          Sit-Stand Transfer    Sit-Stand Bryan (Transfers) independent  -DJ     Assistive Device (Sit-Stand Transfers) other (see comments)  no AD  -DJ       Row Name 05/23/24 1032          Gait/Stairs (Locomotion)    Bryan Level (Gait) modified independence  -DJ     Assistive Device (Gait) other (see comments)  no AD  -DJ     Distance in Feet (Gait) 230  -DJ     Bryan Level (Stairs) stand by assist  -DJ     Handrail Location (Stairs) left side (ascending)  -DJ     Number of Steps (Stairs) 12  -DJ     Ascending Technique (Stairs) step-over-step  -DJ     Descending Technique (Stairs) step-over-step  -DJ     Comment, (Gait/Stairs) Pt amb 230' with SBA without AD on level surfaces with good pace and balance. She negotiated 12 stairs with handrail using step-over-step gt pattern and SBA.  -DJ               User Key  (r) = Recorded By, (t) = Taken By, (c) = Cosigned By      Initials Name Provider Type    Elma Ingram, PT Physical Therapist                   Obj/Interventions       Row Name 05/23/24 1035          Range of Motion Comprehensive    General Range of Motion no range of motion deficits identified  -DJ       Row Name 05/23/24 1035          Strength Comprehensive (MMT)    Comment, General Manual Muscle Testing (MMT) Assessment good extremity strength  -DJ       Row Name 05/23/24 1035          Motor Skills    Motor Skills functional endurance  -DJ     Functional Endurance good  -DJ       Row Name 05/23/24 1035          Balance    Balance Assessment standing dynamic balance;standing static balance  -DJ     Static Standing Balance independent  -DJ     Dynamic Standing Balance independent  -DJ     Position/Device Used, Standing Balance unsupported  -DJ      Balance Interventions sitting;standing;sit to stand;weight shifting activity  -DJ     Comment, Balance good  -DJ       Row Name 05/23/24 1035          Sensory Assessment (Somatosensory)    Sensory Assessment (Somatosensory) other (see comments)  pt reports numbness R hand almost entirely resolved  -DJ               User Key  (r) = Recorded By, (t) = Taken By, (c) = Cosigned By      Initials Name Provider Type    DJ Elma Munson, PT Physical Therapist                   Goals/Plan    No documentation.                  Clinical Impression       Row Name 05/23/24 1036          Pain    Pretreatment Pain Rating 0/10 - no pain  -DJ       Row Name 05/23/24 1036          Plan of Care Review    Plan of Care Reviewed With patient;spouse  -     Outcome Evaluation 54yo pleasant white female admitted 5/22/24 with stroke like symptoms, R hand and foot numbness. PMH Includes cervical radiculopathy. PLOF: Pt lives at home with spouse with 5 EDVIN. She was independent with ADLs and IADLs including driving and taking care of her grandchild. Today, she was found resting in bed,  present and helpful. She sat EOB independently and was able to don her shoes independently. She stood from EOB independently without AD. Pt amb 230' with SBA without AD on level surfaces with good pace and balance. She negotiated 12 stairs with handrail using step-over-step gt pattern and SBA. She returned to recliner chair with all needs met. She is safe to return home with spouse. She does not appear to need skilled PT services at this time - will sign off.  -       Row Name 05/23/24 1036          Therapy Assessment/Plan (PT)    Patient/Family Therapy Goals Statement (PT) home ASA  -     Criteria for Skilled Interventions Met (PT) no problems identified which require skilled intervention  -DJ     Therapy Frequency (PT) evaluation only  -DJ       Row Name 05/23/24 1036          Vital Signs    O2 Delivery Pre Treatment room air  -DJ     O2 Delivery  Intra Treatment room air  -DJ     O2 Delivery Post Treatment room air  -DJ     Pre Patient Position Supine  -DJ     Intra Patient Position Standing  -DJ     Post Patient Position Sitting  -DJ       Row Name 05/23/24 1036          Positioning and Restraints    Pre-Treatment Position in bed  -DJ     Post Treatment Position chair  -DJ     In Chair sitting;call light within reach;encouraged to call for assist;with family/caregiver  -DJ               User Key  (r) = Recorded By, (t) = Taken By, (c) = Cosigned By      Initials Name Provider Type    Elma Ingram, PT Physical Therapist                   Outcome Measures       Row Name 05/23/24 1041 05/23/24 0847       How much help from another person do you currently need...    Turning from your back to your side while in flat bed without using bedrails? 4  -DJ 4  -LP    Moving from lying on back to sitting on the side of a flat bed without bedrails? 4  -DJ 4  -LP    Moving to and from a bed to a chair (including a wheelchair)? 4  -DJ 4  -LP    Standing up from a chair using your arms (e.g., wheelchair, bedside chair)? 4  -DJ 4  -LP    Climbing 3-5 steps with a railing? 4  -DJ 4  -LP    To walk in hospital room? 4  -DJ 4  -LP    AM-PAC 6 Clicks Score (PT) 24  -DJ 24  -LP    Highest Level of Mobility Goal 8 --> Walked 250 feet or more  -DJ 8 --> Walked 250 feet or more  -LP      Row Name 05/23/24 1042 05/23/24 0929       Modified Joselo Scale    Modified Attala Scale 0 - No Symptoms at all.  -DJ 0 - No Symptoms at all. (P)   -MF      Row Name 05/23/24 1042 05/23/24 1041       Functional Assessment    Outcome Measure Options Modified Attala  -DJ AM-PAC 6 Clicks Basic Mobility (PT)  -DJ      Row Name 05/23/24 0929          Functional Assessment    Outcome Measure Options AM-PAC 6 Clicks Daily Activity (OT);Modified Joselo (P)   -MF               User Key  (r) = Recorded By, (t) = Taken By, (c) = Cosigned By      Initials Name Provider Type    Arabella Felipe RN Registered  Nurse    Elma Ingram, PT Physical Therapist    Hellen Boggs, OT Student OT Student                  Physical Therapy Education       Title: PT OT SLP Therapies (In Progress)       Topic: Physical Therapy (In Progress)       Point: Mobility training (Done)       Learning Progress Summary             Patient Acceptance, E, VU,DU by LORENA at 5/23/2024 1042   Family Acceptance, E, VU,DU by LORENA at 5/23/2024 1042                         Point: Body mechanics (Not Started)       Learner Progress:  Not documented in this visit.              Point: Precautions (Done)       Learning Progress Summary             Patient Acceptance, E, VU,DU by LORENA at 5/23/2024 1042   Family Acceptance, E, VU,DU by LORENA at 5/23/2024 1042                                         User Key       Initials Effective Dates Name Provider Type Discipline    LORENA 10/25/19 -  Elma Munson, PT Physical Therapist PT                  PT Recommendation and Plan     Plan of Care Reviewed With: patient, spouse  Outcome Evaluation: 54yo pleasant white female admitted 5/22/24 with stroke like symptoms, R hand and foot numbness. PMH Includes cervical radiculopathy. PLOF: Pt lives at home with spouse with 5 EDVIN. She was independent with ADLs and IADLs including driving and taking care of her grandchild. Today, she was found resting in bed,  present and helpful. She sat EOB independently and was able to don her shoes independently. She stood from EOB independently without AD. Pt amb 230' with SBA without AD on level surfaces with good pace and balance. She negotiated 12 stairs with handrail using step-over-step gt pattern and SBA. She returned to recliner chair with all needs met. She is safe to return home with spouse. She does not appear to need skilled PT services at this time - will sign off.     Time Calculation:   PT Evaluation Complexity  History, PT Evaluation Complexity: 3 or more personal factors and/or comorbidities  Examination of Body  Systems (PT Eval Complexity): total of 4 or more elements  Clinical Presentation (PT Evaluation Complexity): evolving  Clinical Decision Making (PT Evaluation Complexity): moderate complexity  Overall Complexity (PT Evaluation Complexity): moderate complexity     PT Charges       Row Name 05/23/24 1043             Time Calculation    Start Time 1014  -DJ      Stop Time 1029  -DJ      Time Calculation (min) 15 min  -DJ      PT Non-Billable Time (min) 10 min  -DJ      PT Received On 05/23/24  -DJ                User Key  (r) = Recorded By, (t) = Taken By, (c) = Cosigned By      Initials Name Provider Type    Elma Ingram, PT Physical Therapist                  Therapy Charges for Today       Code Description Service Date Service Provider Modifiers Qty    46660146793 HC PT EVAL MOD COMPLEXITY 2 5/23/2024 Elma Munson, PT GP 1    06641502670 HC GAIT TRAINING EA 15 MIN 5/23/2024 Elma Munson, PT GP 1            PT G-Codes  Outcome Measure Options: Modified Vanduser  AM-PAC 6 Clicks Score (PT): 24  AM-PAC 6 Clicks Score (OT): (P) 24  Modified Vanduser Scale: 0 - No Symptoms at all.    PT Discharge Summary  Anticipated Discharge Disposition (PT): home    Elma Munson PT  5/23/2024

## 2024-05-24 ENCOUNTER — READMISSION MANAGEMENT (OUTPATIENT)
Dept: CALL CENTER | Facility: HOSPITAL | Age: 54
End: 2024-05-24
Payer: COMMERCIAL

## 2024-05-24 ENCOUNTER — APPOINTMENT (OUTPATIENT)
Dept: CARDIOLOGY | Facility: HOSPITAL | Age: 54
End: 2024-05-24
Payer: COMMERCIAL

## 2024-05-24 VITALS
TEMPERATURE: 98.1 F | OXYGEN SATURATION: 97 % | WEIGHT: 219 LBS | HEIGHT: 62 IN | RESPIRATION RATE: 16 BRPM | DIASTOLIC BLOOD PRESSURE: 74 MMHG | BODY MASS INDEX: 40.3 KG/M2 | SYSTOLIC BLOOD PRESSURE: 110 MMHG | HEART RATE: 61 BPM

## 2024-05-24 PROBLEM — Q21.12 PFO (PATENT FORAMEN OVALE): Status: ACTIVE | Noted: 2024-05-24

## 2024-05-24 LAB
AT III PPP CHRO-ACNC: 103 % (ref 90–134)
BH CV ECHO MEAS - RAP SYSTOLE: 3 MMHG
BH CV ECHO MEAS - RVSP: 20 MMHG
BH CV ECHO MEAS - TR MAX PG: 17 MMHG
BH CV ECHO MEAS - TR MAX VEL: 206.3 CM/SEC
BH CV ECHO SHUNT ASSESSMENT PERFORMED (HIDDEN SCRIPTING): 1
BH CV LOWER VASCULAR LEFT COMMON FEMORAL AUGMENT: NORMAL
BH CV LOWER VASCULAR LEFT COMMON FEMORAL COMPETENT: NORMAL
BH CV LOWER VASCULAR LEFT COMMON FEMORAL COMPRESS: NORMAL
BH CV LOWER VASCULAR LEFT COMMON FEMORAL PHASIC: NORMAL
BH CV LOWER VASCULAR LEFT COMMON FEMORAL SPONT: NORMAL
BH CV LOWER VASCULAR LEFT DISTAL FEMORAL COMPRESS: NORMAL
BH CV LOWER VASCULAR LEFT GASTRONEMIUS COMPRESS: NORMAL
BH CV LOWER VASCULAR LEFT GREATER SAPH AK COMPRESS: NORMAL
BH CV LOWER VASCULAR LEFT GREATER SAPH BK COMPRESS: NORMAL
BH CV LOWER VASCULAR LEFT LESSER SAPH COMPRESS: NORMAL
BH CV LOWER VASCULAR LEFT MID FEMORAL AUGMENT: NORMAL
BH CV LOWER VASCULAR LEFT MID FEMORAL COMPETENT: NORMAL
BH CV LOWER VASCULAR LEFT MID FEMORAL COMPRESS: NORMAL
BH CV LOWER VASCULAR LEFT MID FEMORAL PHASIC: NORMAL
BH CV LOWER VASCULAR LEFT MID FEMORAL SPONT: NORMAL
BH CV LOWER VASCULAR LEFT PERONEAL COMPRESS: NORMAL
BH CV LOWER VASCULAR LEFT POPLITEAL AUGMENT: NORMAL
BH CV LOWER VASCULAR LEFT POPLITEAL COMPETENT: NORMAL
BH CV LOWER VASCULAR LEFT POPLITEAL COMPRESS: NORMAL
BH CV LOWER VASCULAR LEFT POPLITEAL PHASIC: NORMAL
BH CV LOWER VASCULAR LEFT POPLITEAL SPONT: NORMAL
BH CV LOWER VASCULAR LEFT POSTERIOR TIBIAL COMPRESS: NORMAL
BH CV LOWER VASCULAR LEFT PROFUNDA FEMORAL COMPRESS: NORMAL
BH CV LOWER VASCULAR LEFT PROXIMAL FEMORAL COMPRESS: NORMAL
BH CV LOWER VASCULAR LEFT SAPHENOFEMORAL JUNCTION COMPRESS: NORMAL
BH CV LOWER VASCULAR RIGHT COMMON FEMORAL AUGMENT: NORMAL
BH CV LOWER VASCULAR RIGHT COMMON FEMORAL COMPETENT: NORMAL
BH CV LOWER VASCULAR RIGHT COMMON FEMORAL COMPRESS: NORMAL
BH CV LOWER VASCULAR RIGHT COMMON FEMORAL PHASIC: NORMAL
BH CV LOWER VASCULAR RIGHT COMMON FEMORAL SPONT: NORMAL
BH CV LOWER VASCULAR RIGHT DISTAL FEMORAL COMPRESS: NORMAL
BH CV LOWER VASCULAR RIGHT GASTRONEMIUS COMPRESS: NORMAL
BH CV LOWER VASCULAR RIGHT GREATER SAPH AK COMPRESS: NORMAL
BH CV LOWER VASCULAR RIGHT GREATER SAPH BK COMPRESS: NORMAL
BH CV LOWER VASCULAR RIGHT LESSER SAPH COMPRESS: NORMAL
BH CV LOWER VASCULAR RIGHT MID FEMORAL AUGMENT: NORMAL
BH CV LOWER VASCULAR RIGHT MID FEMORAL COMPETENT: NORMAL
BH CV LOWER VASCULAR RIGHT MID FEMORAL COMPRESS: NORMAL
BH CV LOWER VASCULAR RIGHT MID FEMORAL PHASIC: NORMAL
BH CV LOWER VASCULAR RIGHT MID FEMORAL SPONT: NORMAL
BH CV LOWER VASCULAR RIGHT PERONEAL COMPRESS: NORMAL
BH CV LOWER VASCULAR RIGHT POPLITEAL AUGMENT: NORMAL
BH CV LOWER VASCULAR RIGHT POPLITEAL COMPETENT: NORMAL
BH CV LOWER VASCULAR RIGHT POPLITEAL COMPRESS: NORMAL
BH CV LOWER VASCULAR RIGHT POPLITEAL PHASIC: NORMAL
BH CV LOWER VASCULAR RIGHT POPLITEAL SPONT: NORMAL
BH CV LOWER VASCULAR RIGHT POSTERIOR TIBIAL COMPRESS: NORMAL
BH CV LOWER VASCULAR RIGHT PROFUNDA FEMORAL COMPRESS: NORMAL
BH CV LOWER VASCULAR RIGHT PROXIMAL FEMORAL COMPRESS: NORMAL
BH CV LOWER VASCULAR RIGHT SAPHENOFEMORAL JUNCTION COMPRESS: NORMAL
F5 GENE MUT ANL BLD/T: NORMAL
FACTOR II, DNA ANALYSIS: NORMAL
HCYS SERPL-MCNC: 11 UMOL/L (ref 0–15)
LV EF 2D ECHO EST: 60 %
PROT S FREE PPP-ACNC: 108 % (ref 49–138)

## 2024-05-24 PROCEDURE — 85732 THROMBOPLASTIN TIME PARTIAL: CPT | Performed by: STUDENT IN AN ORGANIZED HEALTH CARE EDUCATION/TRAINING PROGRAM

## 2024-05-24 PROCEDURE — 93970 EXTREMITY STUDY: CPT

## 2024-05-24 PROCEDURE — 85305 CLOT INHIBIT PROT S TOTAL: CPT | Performed by: STUDENT IN AN ORGANIZED HEALTH CARE EDUCATION/TRAINING PROGRAM

## 2024-05-24 PROCEDURE — 93325 DOPPLER ECHO COLOR FLOW MAPG: CPT

## 2024-05-24 PROCEDURE — 81241 F5 GENE: CPT | Performed by: STUDENT IN AN ORGANIZED HEALTH CARE EDUCATION/TRAINING PROGRAM

## 2024-05-24 PROCEDURE — 85613 RUSSELL VIPER VENOM DILUTED: CPT | Performed by: STUDENT IN AN ORGANIZED HEALTH CARE EDUCATION/TRAINING PROGRAM

## 2024-05-24 PROCEDURE — 85300 ANTITHROMBIN III ACTIVITY: CPT | Performed by: STUDENT IN AN ORGANIZED HEALTH CARE EDUCATION/TRAINING PROGRAM

## 2024-05-24 PROCEDURE — 86147 CARDIOLIPIN ANTIBODY EA IG: CPT | Performed by: STUDENT IN AN ORGANIZED HEALTH CARE EDUCATION/TRAINING PROGRAM

## 2024-05-24 PROCEDURE — 81240 F2 GENE: CPT | Performed by: STUDENT IN AN ORGANIZED HEALTH CARE EDUCATION/TRAINING PROGRAM

## 2024-05-24 PROCEDURE — 83090 ASSAY OF HOMOCYSTEINE: CPT | Performed by: STUDENT IN AN ORGANIZED HEALTH CARE EDUCATION/TRAINING PROGRAM

## 2024-05-24 PROCEDURE — 86146 BETA-2 GLYCOPROTEIN ANTIBODY: CPT | Performed by: STUDENT IN AN ORGANIZED HEALTH CARE EDUCATION/TRAINING PROGRAM

## 2024-05-24 PROCEDURE — 85220 BLOOC CLOT FACTOR V TEST: CPT | Performed by: STUDENT IN AN ORGANIZED HEALTH CARE EDUCATION/TRAINING PROGRAM

## 2024-05-24 PROCEDURE — 85306 CLOT INHIBIT PROT S FREE: CPT | Performed by: STUDENT IN AN ORGANIZED HEALTH CARE EDUCATION/TRAINING PROGRAM

## 2024-05-24 PROCEDURE — 85670 THROMBIN TIME PLASMA: CPT | Performed by: STUDENT IN AN ORGANIZED HEALTH CARE EDUCATION/TRAINING PROGRAM

## 2024-05-24 PROCEDURE — 93312 ECHO TRANSESOPHAGEAL: CPT | Performed by: INTERNAL MEDICINE

## 2024-05-24 PROCEDURE — 25010000002 FENTANYL CITRATE (PF) 50 MCG/ML SOLUTION: Performed by: INTERNAL MEDICINE

## 2024-05-24 PROCEDURE — 93970 EXTREMITY STUDY: CPT | Performed by: SURGERY

## 2024-05-24 PROCEDURE — 85303 CLOT INHIBIT PROT C ACTIVITY: CPT | Performed by: STUDENT IN AN ORGANIZED HEALTH CARE EDUCATION/TRAINING PROGRAM

## 2024-05-24 PROCEDURE — 85705 THROMBOPLASTIN INHIBITION: CPT | Performed by: STUDENT IN AN ORGANIZED HEALTH CARE EDUCATION/TRAINING PROGRAM

## 2024-05-24 PROCEDURE — 93321 DOPPLER ECHO F-UP/LMTD STD: CPT | Performed by: INTERNAL MEDICINE

## 2024-05-24 PROCEDURE — 86038 ANTINUCLEAR ANTIBODIES: CPT | Performed by: STUDENT IN AN ORGANIZED HEALTH CARE EDUCATION/TRAINING PROGRAM

## 2024-05-24 PROCEDURE — 93325 DOPPLER ECHO COLOR FLOW MAPG: CPT | Performed by: INTERNAL MEDICINE

## 2024-05-24 PROCEDURE — 25010000002 MIDAZOLAM PER 1 MG: Performed by: INTERNAL MEDICINE

## 2024-05-24 PROCEDURE — 99233 SBSQ HOSP IP/OBS HIGH 50: CPT | Performed by: STUDENT IN AN ORGANIZED HEALTH CARE EDUCATION/TRAINING PROGRAM

## 2024-05-24 PROCEDURE — G0378 HOSPITAL OBSERVATION PER HR: HCPCS

## 2024-05-24 PROCEDURE — 93246 EXT ECG>7D<15D RECORDING: CPT

## 2024-05-24 PROCEDURE — 93312 ECHO TRANSESOPHAGEAL: CPT

## 2024-05-24 PROCEDURE — 93321 DOPPLER ECHO F-UP/LMTD STD: CPT

## 2024-05-24 PROCEDURE — 85302 CLOT INHIBIT PROT C ANTIGEN: CPT | Performed by: STUDENT IN AN ORGANIZED HEALTH CARE EDUCATION/TRAINING PROGRAM

## 2024-05-24 PROCEDURE — 86148 ANTI-PHOSPHOLIPID ANTIBODY: CPT | Performed by: STUDENT IN AN ORGANIZED HEALTH CARE EDUCATION/TRAINING PROGRAM

## 2024-05-24 RX ORDER — ASPIRIN 81 MG/1
81 TABLET ORAL DAILY
Qty: 30 TABLET | Refills: 0 | Status: SHIPPED | OUTPATIENT
Start: 2024-05-25 | End: 2024-06-24

## 2024-05-24 RX ORDER — ASPIRIN 81 MG/1
81 TABLET ORAL DAILY
Status: DISCONTINUED | OUTPATIENT
Start: 2024-05-25 | End: 2024-05-24 | Stop reason: HOSPADM

## 2024-05-24 RX ORDER — MIDAZOLAM HYDROCHLORIDE 1 MG/ML
INJECTION INTRAMUSCULAR; INTRAVENOUS
Status: COMPLETED | OUTPATIENT
Start: 2024-05-24 | End: 2024-05-24

## 2024-05-24 RX ORDER — FENTANYL CITRATE 50 UG/ML
INJECTION, SOLUTION INTRAMUSCULAR; INTRAVENOUS
Status: COMPLETED | OUTPATIENT
Start: 2024-05-24 | End: 2024-05-24

## 2024-05-24 RX ORDER — ATORVASTATIN CALCIUM 80 MG/1
80 TABLET, FILM COATED ORAL NIGHTLY
Qty: 30 TABLET | Refills: 0 | Status: SHIPPED | OUTPATIENT
Start: 2024-05-24 | End: 2024-06-23

## 2024-05-24 RX ORDER — SODIUM CHLORIDE 9 MG/ML
INJECTION, SOLUTION INTRAVENOUS
Status: COMPLETED | OUTPATIENT
Start: 2024-05-24 | End: 2024-05-24

## 2024-05-24 RX ADMIN — SODIUM CHLORIDE 50 ML/HR: 9 INJECTION, SOLUTION INTRAVENOUS at 08:55

## 2024-05-24 RX ADMIN — MIDAZOLAM 2 MG: 1 INJECTION INTRAMUSCULAR; INTRAVENOUS at 09:14

## 2024-05-24 RX ADMIN — ASPIRIN 325 MG: 325 TABLET ORAL at 11:25

## 2024-05-24 RX ADMIN — FENTANYL CITRATE 50 MCG: 50 INJECTION INTRAMUSCULAR; INTRAVENOUS at 09:14

## 2024-05-24 RX ADMIN — MIDAZOLAM 1 MG: 1 INJECTION INTRAMUSCULAR; INTRAVENOUS at 09:17

## 2024-05-24 RX ADMIN — FENTANYL CITRATE 25 MCG: 50 INJECTION INTRAMUSCULAR; INTRAVENOUS at 09:20

## 2024-05-24 RX ADMIN — Medication 10 ML: at 11:26

## 2024-05-24 RX ADMIN — FENTANYL CITRATE 25 MCG: 50 INJECTION INTRAMUSCULAR; INTRAVENOUS at 09:17

## 2024-05-24 RX ADMIN — MIDAZOLAM 1 MG: 1 INJECTION INTRAMUSCULAR; INTRAVENOUS at 09:20

## 2024-05-24 NOTE — PROGRESS NOTES
"DOS: 2024  NAME: Linda Castano   : 1970  PCP: Provider, No Known  Chief Complaint   Patient presents with    Extremity Weakness       Chief complaint: Stroke  Subjective: Patient seen and examined the bedside this morning, no acute overnight event.  Patient and neurological symptoms completely resolved.  NIH score of 0.    Objective:  Vital signs: /74 (BP Location: Right arm, Patient Position: Lying)   Pulse 61   Temp 98.1 °F (36.7 °C) (Oral)   Resp 16   Ht 157.5 cm (62\")   Wt 99.3 kg (219 lb)   SpO2 97%   BMI 40.06 kg/m²    Gen: NAD, vitals reviewed  MS: oriented x3, recent/remote memory intact, normal attention/concentration, language intact, no neglect.  CN: visual acuity grossly normal, PERRL, EOMI, no facial droop, no dysarthria  Motor: 5/5 throughout upper and lower extremities, normal tone  Sensory: intact to light touch all 4 ext.    Laboratory results:  Lab Results   Component Value Date    GLUCOSE 97 2024    CALCIUM 9.2 2024     2024    K 4.1 2024    CO2 23.8 2024     2024    BUN 18 2024    CREATININE 0.82 2024    EGFRIFAFRI >60 12/15/2020    BCR 22.0 2024    ANIONGAP 8.2 2024     Lab Results   Component Value Date    WBC 7.15 2024    HGB 11.8 (L) 2024    HCT 36.3 2024    MCV 84.8 2024     2024     Lab Results   Component Value Date     (H) 2024         Lab 24  0905   HEMOGLOBIN A1C 5.80*        Review of labs: A1c 5.8, , TSH normal, B12 445.    Review and interpretation of imaging: No new imaging to review.    Impression:  53-year-old female with history of hypertension following which she lost weight and was taken off of medication over 10 years ago who presented with complaints of acute onset right hand and right foot weakness.  In the ED /93.  TNK was not given due to minor and improving symptoms.  She was on no medications prior to " arrival and has not seen a PCP since 2020.  She does not smoke or use illicit drugs and only drinks alcohol occasionally.     Workup:  MRI brain with and without contrast: Old right caudate/corona radiata/internal capsule stroke.  Acute left frontal parietal subcortical stroke extending into the corona radiata.  MRI brain images viewed by me, shows left subcortical stroke.    MRI cervical spine with and without:Mild cervical spondylosis noted with some degenerative changes, see report.  CTA head/neck: No significant stenosis, no occlusion, or aneurysm.  2D echo: EF 67.7%, normal left atrial size and volume, no valvular source of stroke.  Saline study done but no mention of results.  Labs: B12 445, TSH 4.1, DYE638, hemoglobin A1c 5.80%, folate 7.63.     Diagnosis:   Left frontoparietal stroke, embolic appearing  History of hypertension  Hyperlipidemia     Plan:  -CLAYTON showed small PFO, lower extremity ultrasound negative  -Started on low-dose Xarelto 2.5 mg twice daily plus baby aspirin as per [facility] trial.  -Hypercoagulation panel ordered, neurology will follow as an outpatient  -PFO closure as an outpatient per cardiology  -Zio patch prior to discharge if negative consider loop recorder  -Lipitor 80 mg daily started this admission  -Blood pressure to normalize  -Okay to discharge from stroke standpoint  -Follow-up with neurology as an outpatient, MA texted to arrange follow-up in 6 to 8 weeks.  Stroke Education    Discussed with nursing staff, admitting hospitalist Dr. Humphrey

## 2024-05-24 NOTE — CASE MANAGEMENT/SOCIAL WORK
Case Management Discharge Note      Final Note: Home with spouse. Family transport         Selected Continued Care - Admitted Since 5/22/2024       Destination    No services have been selected for the patient.                Durable Medical Equipment    No services have been selected for the patient.                Dialysis/Infusion    No services have been selected for the patient.                Home Medical Care    No services have been selected for the patient.                Therapy    No services have been selected for the patient.                Community Resources    No services have been selected for the patient.                Community & DME    No services have been selected for the patient.                    Transportation Services  Private: Car    Final Discharge Disposition Code: 01 - home or self-care

## 2024-05-24 NOTE — DISCHARGE SUMMARY
Date of Admission: 5/22/2024  Date of Discharge:  5/24/2024  Primary Care Physician: Provider, No Known     Discharge Diagnosis:  Active Hospital Problems    Diagnosis  POA    **Acute CVA (cerebrovascular accident) [I63.9]  Unknown    PFO (patent foramen ovale) [Q21.12]  Not Applicable    Obesity [E66.9]  Unknown      Resolved Hospital Problems    Diagnosis Date Resolved POA    TIA (transient ischemic attack) [G45.9] 05/22/2024 Yes       DETAILS OF HOSPITAL STAY     Hospital course  This is a 53-year-old female with no significant past medical history presented to the emergency room with complaints of right hand and right foot weakness that was acute in onset.  Please see H&P for full details.  She was found to have a left frontal parietal stroke of unclear etiology.  Neurology was consulted and she was placed on full dose aspirin as well as high-dose statin.  Her symptoms improved rapidly.  She underwent workup as outlined below which culminated in a CLAYTON performed this morning.  This did reveal a small PFO however lower extremity Doppler was negative.  Cardiology is going to place a Zio patch and follow her up closely in the office with further determination at that time as to whether she will require a loop recorder.  Neurology recommends low-dose Xarelto and low-dose aspirin moving forward.  They will conduct a hypercoagulable workup and see her once again in the office.  She is now medically stable, consultants have cleared her for discharge, and she will be released back home.      Pertinent Test Results and Procedures Performed    Head CT:  1. There is a discrete 15 x 6 x 9 mm ovoid area of low density extending   from the anterior body of the right caudate nucleus into the anterior   limb of the right internal capsule that most probably represents either   a late subacute or an old lacunar infarct. The remainder of the head CT   is within normal limits. The etiology of the patient's acute onset    right-sided weakness and dizziness is not established on this exam.     MRI brain:  1. There is a 15 x 8 x 8 mm old lacunar infarct extending from the body   of the right caudate nucleus into the mid right corona radiata region   and superior aspect of the genu of the right internal capsule.      2. There is a triangular area of acute infarction measuring 10 x 11 mm   in size extending from the superior left frontal parietal subcortical   white matter into the posterior left corona radiata region. The   remainder of the MRI of the brain is normal.     MRI cervical spine:  1. There is mild cervical spondylosis as described. There is some disc   space narrowing and degenerative endplate changes from C3 to C7 and   posterior spurs minimally narrow the canal at C3-4. Posterior spurs   indent the ventral thecal sac and abut the ventral cord mildly narrowing   the canal at C4-5 and posterior spurs minimally indent the ventral   thecal sac resulting in minimal if any canal narrowing at C5-6 and C6-7.   No cervical disc herniation is identified and no additional cervical   canal narrowing is identified. There is some uncovertebral joint   spurring into the neural foramen on resulting in minimal foraminal   narrowing at C3-4 up to mild to moderate bilateral bony foraminal   narrowing at C4-5 and mild right and mild to moderate left bony   foraminal narrowing at C5-6.     CTA of the head and neck:  1.  No finding of stenosis or occlusion of the cervical or proximal   intracranial arterial vasculature. No intracranial aneurysm evident.     Bilateral lower extremity Doppler negative for DVT:  1.  No finding of stenosis or occlusion of the cervical or proximal   intracranial arterial vasculature. No intracranial aneurysm evident.     2D echocardiogram:    Left ventricular systolic function is normal. Calculated left ventricular EF = 67.7%    Left ventricular diastolic function was normal.    Estimated right ventricular  systolic pressure from tricuspid regurgitation is normal (<35 mmHg). Calculated right ventricular systolic pressure from tricuspid regurgitation is 17 mmHg.    CLAYTON revealed a small PFO but was otherwise normal      Physical Exam at Discharge:  General: No acute distress, AAOx3  HEENT: EOMI, PERRL  Cardiovascular: +s1 and s2, RRR  Lungs: No rhonchi or wheezing  Abdomen: soft, nontender    Consults:   Consults       Date and Time Order Name Status Description    5/23/2024  7:07 AM Inpatient Cardiology Consult Completed     5/22/2024  3:45 PM Inpatient Hospitalist Consult      5/22/2024 10:21 AM Inpatient Neurology Consult Stroke Completed     5/22/2024  9:06 AM Inpatient Neurology Consult Stroke Completed     5/22/2024  9:06 AM Inpatient Neurology Consult Stroke Completed               Condition on Discharge: Stable, improved    Discharge Disposition  Home or Self Care    Discharge Medications     Discharge Medications        New Medications        Instructions Start Date   aspirin 81 MG EC tablet   81 mg, Oral, Daily   Start Date: May 25, 2024     atorvastatin 80 MG tablet  Commonly known as: LIPITOR   80 mg, Oral, Nightly      Rivaroxaban 2.5 MG tablet  Commonly known as: XARELTO   2.5 mg, Oral, 2 Times Daily With Meals               Discharge Diet:   Diet Instructions       Diet: Regular/House Diet; Regular (IDDSI 7); Thin (IDDSI 0)      Discharge Diet: Regular/House Diet    Texture: Regular (IDDSI 7)    Fluid Consistency: Thin (IDDSI 0)            Activity at Discharge:   Activity Instructions       Activity as Tolerated              Follow-up Appointments  No future appointments.  Additional Instructions for the Follow-ups that You Need to Schedule       Discharge Follow-up with PCP   As directed       Currently Documented PCP:    Provider, No Known    PCP Phone Number:    [phone]     Follow Up Details: 1 week        Discharge Follow-up with Specialty: [facility] in 3-4 weeks   As directed       Specialty: [facility] in 3-4 weeks        Discharge Follow-up with Specialty: [facility] in 3-4 weeks   As directed      Specialty: [facility] in 3-4 weeks                    I have examined and discussed discharge planning with the patient today.    Carlos Humphrey MD  05/24/24  12:52 EDT    Time: Discharge greater than 30 min

## 2024-05-24 NOTE — PROGRESS NOTES
CLAYTON performed this AM, no complications. The CLAYTON is normal except for a small PFO with a tiny amount of shunting.     Will check LE duplex -- if positive for DVT, recommend full dose OAC. If negative, will defer DAPT vs low dose rivaroxaban (2.5mg BID) + low dose aspirin to neuro. Will place 14 rhythm monitor and plan on outpatient cardiology office eval and referral to sleep medicine. If Zio negative, will discuss ILR with patient in office , then consideration of PFO closure if no PAF seen in 6-12 months.

## 2024-05-24 NOTE — PLAN OF CARE
Goal Outcome Evaluation:            Pt had a CLAYTON and Doppler in lower extremities this am. Back to the room, still foggy from anesthesia. Pt feeling good, ready for d/c after blood drawn. Going home in private vehicle with  at bedside.

## 2024-05-24 NOTE — PLAN OF CARE
Goal Outcome Evaluation:               States there is no sensation difference between right leg and left leg, but still a very small difference with right arm being less than left arm. No other neuro abnormalities. Pt. Has been NPO since midnight for CLAYTON. Pt. Understands procedure and reason for this. Pt. Hoping to be discharged home today.

## 2024-05-25 LAB
APTT SCREEN TO CONFIRM RATIO: 1.03 RATIO (ref 0–1.34)
CARDIOLIPIN IGG SER IA-ACNC: <9 GPL U/ML (ref 0–14)
CARDIOLIPIN IGM SER IA-ACNC: <9 MPL U/ML (ref 0–12)
CONFIRM APTT/NORMAL: 31.8 SEC (ref 0–47.6)
FACT V ACT/NOR PPP: 130 % (ref 70–150)
LA 2 SCREEN W REFLEX-IMP: NORMAL
SCREEN APTT: 36.6 SEC (ref 0–43.5)
SCREEN DRVVT: 33.9 SEC (ref 0–47)
THROMBIN TIME: 17.7 SEC (ref 0–23)

## 2024-05-25 NOTE — OUTREACH NOTE
Prep Survey      Flowsheet Row Responses   [facility] patient discharged from? [facility]   Is LACE score < 7 ? Yes   Eligibility Readm Mgmt   Discharge diagnosis Acute CVA (cerebrovascular accident   Does the patient have one of the following disease processes/diagnoses(primary or secondary)? Stroke   Does the patient have Home health ordered? No   Is there a DME ordered? No   Prep survey completed? Yes            DAVID PRETTY - Registered Nurse

## 2024-05-26 LAB — PROT S AG ACT/NOR PPP IA: 114 % (ref 60–150)

## 2024-05-28 LAB
ANA SER QL: NEGATIVE
PROT C ACT/NOR PPP: 140 % (ref 86–163)
PROT S ACT/NOR PPP: 115 % (ref 70–127)

## 2024-05-29 LAB
B2 GLYCOPROT1 IGA SER-ACNC: <9 GPI IGA UNITS (ref 0–25)
B2 GLYCOPROT1 IGG SER-ACNC: <9 GPI IGG UNITS (ref 0–20)
B2 GLYCOPROT1 IGM SER-ACNC: <9 GPI IGM UNITS (ref 0–32)
PROT C AG ACT/NOR PPP IA: 123 % (ref 60–150)

## 2024-05-30 ENCOUNTER — READMISSION MANAGEMENT (OUTPATIENT)
Dept: CALL CENTER | Facility: HOSPITAL | Age: 54
End: 2024-05-30
Payer: COMMERCIAL

## 2024-05-30 NOTE — OUTREACH NOTE
Stroke Week 1 Survey      Flowsheet Row Responses   [facility] patient discharged from? [facility]   Does the patient have one of the following disease processes/diagnoses(primary or secondary)? Stroke   Week 1 attempt successful? Yes   Call start time 0850   Call end time 0854   Discharge diagnosis Acute CVA (cerebrovascular accident   Person spoke with today (if not patient) and relationship spouse   Meds reviewed with patient/caregiver? Yes   Is the patient having any side effects they believe may be caused by any medication additions or changes? No   Does the patient have all medications ordered at discharge? Yes   Is the patient taking all medications as directed (includes completed medication regime)? Yes   Comments regarding appointments Cards appt on 6/28/24   Does the patient have a primary care provider?  No   Has the patient kept scheduled appointments due by today? N/A   Has home health visited the patient within 72 hours of discharge? N/A   DME comments pt is wearing a zio patch for 2 weeks   Psychosocial issues? No   Does the patient require any assistance with activities of daily living such as eating, bathing, dressing, walking, etc.? No   Does the patient have any residual symptoms from stroke/TIA? No   Residual symptoms comments a hint of rt hand weakness   Did the patient receive a copy of their discharge instructions? Yes   Nursing interventions Reviewed instructions with patient   What is the patient's perception of their health status since discharge? Improving   Nursing interventions Nurse provided patient education   Is the patient/caregiver able to teach back the risk factors for a stroke? History of TIAs, High Cholesterol, High blood pressure-goal below 120/80   Is the patient/caregiver able to teach back the hierarchy of who to call/visit for symptoms/problems? PCP, Specialist, Home health nurse, Urgent Care, ED, 911 Yes   Is the patient able to teach back FAST for Stroke? B alance:  Watch for sudden loss of balance, E yes: Check for vision loss, F ace: Look for an uneven smile, S peech: Listen for slurred speech, T manjinder: Call 9-1-1 right away, A rm: Check if one arm is weak   Week 1 call completed? Yes   Revoked No further contact(revokes)-requires comment   Is the patient interested in additional calls from an ambulatory ? No   Would this patient benefit from a Referral to [facility] Social Work? No   Graduated/Revoked comments spouse reports pt is doing well, no concerns at this time   Call end time [id number]            Kayla DEJESUS - Registered Nurse

## 2024-05-31 LAB
PS IGA SER-ACNC: 1 APS UNITS (ref 0–19)
PS IGG SER-ACNC: 12 UNITS (ref 0–30)
PS IGM SER-ACNC: 13 UNITS (ref 0–30)

## 2024-06-14 ENCOUNTER — TELEPHONE (OUTPATIENT)
Dept: CARDIOLOGY | Facility: CLINIC | Age: 54
End: 2024-06-14
Payer: COMMERCIAL

## 2024-06-14 NOTE — TELEPHONE ENCOUNTER
----- Message from Shadi Anguiano sent at 6/13/2024  4:56 PM EDT -----  Please let her know that this is normal and we will discuss next steps at her upcoming appointment in 2 weeks.    Trey jenkins

## 2024-06-14 NOTE — TELEPHONE ENCOUNTER
I tried to call Linda Castano but there was no answer.  Left a voicemail asking patient to call back.  Will continue to try to reach pt.    HUB- if pt calls back, please transfer through to triage.    Thank you,    Audra MAHMOOD RN  [place] [facility] [place]  06/14/24 08:27 EDT

## 2024-06-14 NOTE — TELEPHONE ENCOUNTER
Pt called back. Went over results and recommendations. Pt verbalized understanding.    Thank you,    Beverly Ramirez, RN  Triage [facility] [place]  06/14/24 08:37 EDT

## 2024-06-27 ENCOUNTER — TELEPHONE (OUTPATIENT)
Dept: NEUROLOGY | Facility: CLINIC | Age: 54
End: 2024-06-27
Payer: COMMERCIAL

## 2024-06-28 ENCOUNTER — OFFICE VISIT (OUTPATIENT)
Dept: CARDIOLOGY | Facility: CLINIC | Age: 54
End: 2024-06-28
Payer: COMMERCIAL

## 2024-06-28 VITALS
HEART RATE: 66 BPM | BODY MASS INDEX: 38.9 KG/M2 | WEIGHT: 211.4 LBS | SYSTOLIC BLOOD PRESSURE: 140 MMHG | DIASTOLIC BLOOD PRESSURE: 90 MMHG | HEIGHT: 62 IN

## 2024-06-28 DIAGNOSIS — Q21.12 PFO (PATENT FORAMEN OVALE): ICD-10-CM

## 2024-06-28 DIAGNOSIS — R03.0 TRANSIENT ELEVATED BLOOD PRESSURE: ICD-10-CM

## 2024-06-28 DIAGNOSIS — E66.01 CLASS 2 SEVERE OBESITY WITH SERIOUS COMORBIDITY AND BODY MASS INDEX (BMI) OF 38.0 TO 38.9 IN ADULT, UNSPECIFIED OBESITY TYPE: ICD-10-CM

## 2024-06-28 DIAGNOSIS — I63.9 ACUTE CVA (CEREBROVASCULAR ACCIDENT): Primary | ICD-10-CM

## 2024-06-28 RX ORDER — ASPIRIN 81 MG/1
TABLET, COATED ORAL
COMMUNITY
Start: 2024-06-25

## 2024-06-28 RX ORDER — ATORVASTATIN CALCIUM 80 MG/1
40 TABLET, FILM COATED ORAL NIGHTLY
COMMUNITY

## 2024-06-28 NOTE — H&P (VIEW-ONLY)
Date of Office Visit: 2024  Encounter Provider: ANABEAL Garcia  Place of Service: [facility] CARDIOLOGY  Patient Name: Linda Castano  :1970  Primary Cardiologist: Dr. Shadi Anguiano    Chief Complaint   Patient presents with    Stroke    PFO    Hospital Follow Up Visit   :     HPI: Linda Castano is a 53 y.o. female who presents today for hospital follow-up visit.  She is a new patient to me and I reviewed her medical records.    She has a previous history of hypertension and with weight loss she was able to come off her medications. Her father had a stroke in his 70s.    In May 2024, she presented with right-sided weakness and was found to have an acute left frontal parietal subcortical stroke with evidence of an old right internal capsule stroke.  Her BP upon arrival was 175/93 and she was in a normal sinus rhythm.  Cholesterol panel was elevated.  CLAYTON was normal except for small PFO with a tiny amount of shunting.  Venous duplex negative for DVT.  Dr. Anguiano advised against hormone replacement, recommended outpatient sleep study, hypercoagulable panel, and 14-day outpatient monitor.  She was discharged on low-dose rivaroxaban and aspirin.    14-day monitor showed normal sinus rhythm, average heart rate 76 bpm, and rare ectopy.    She presents today for follow-up visit and her  is accompanying her.  We reviewed the hospital records, testing, and new diagnoses.  She denies any residual symptoms after her stroke.  Driving in today, she experienced a left-sided chest pressure while sitting.  This is the first time she ever experienced this and it has resolved.  She reports shortness of breath 1 time and some fatigue.  She has been experiencing some left hip joint pain that is been sharp and sometimes her left toes fall asleep.  She denies palpitations, dizziness, syncope, or bleeding.  I recommended that she start monitoring her blood pressures at home.  She  started experiencing some myalgias and joint pain and has decreased her atorvastatin from 80 mg to 40 mg daily.      Past Medical History:   Diagnosis Date    Hypertension     improved with weight loss    Obesity     PFO (patent foramen ovale)     Stroke        History reviewed. No pertinent surgical history.    Social History     Socioeconomic History    Marital status:    Tobacco Use    Smoking status: Never    Smokeless tobacco: Never   Vaping Use    Vaping status: Never Used   Substance and Sexual Activity    Alcohol use: Yes     Comment: occasionally, 1x a month    Drug use: Never    Sexual activity: Defer       Family History   Problem Relation Age of Onset    Hypertension Mother     Stroke Father     Hypertension Father     Hyperlipidemia Father        The following portion of the patient's history were reviewed and updated as appropriate: past medical history, past surgical history, past social history, past family history, allergies, current medications, and problem list.    Review of Systems   Constitutional: Positive for malaise/fatigue.   Eyes: Negative.    Cardiovascular:  Positive for chest pain.   Hematologic/Lymphatic: Negative.    Musculoskeletal:  Positive for joint pain and myalgias.   Neurological: Negative.        Allergies   Allergen Reactions    Sulfa Antibiotics Hives         Current Outpatient Medications:     Aspirin Low Dose 81 MG EC tablet, , Disp: , Rfl:     atorvastatin (LIPITOR) 80 MG tablet, Take 0.5 tablets by mouth Every Night. Takes 1/2 TABLET AT NIGHT, Disp: , Rfl:     [employer] TRIPLE MAGNESIUM COMPLEX PO, Take 300 mg by mouth Every Night., Disp: , Rfl:     Rivaroxaban (XARELTO) 2.5 MG tablet, Take 1 tablet by mouth 2 (Two) Times a Day With Meals. Indications: Stoke with PFO, Disp: 60 tablet, Rfl: 0         Objective:     Vitals:    06/28/24 1031 06/28/24 1116   BP: 140/88 140/90   BP Location: Right arm Left arm   Patient Position: Sitting Sitting   Cuff Size: Adult    Pulse:  "66    Weight: 95.9 kg (211 lb 6.4 oz)    Height: 157.5 cm (62.01\")      Body mass index is 38.66 kg/m².    PHYSICAL EXAM:    Vitals Reviewed.   General Appearance: No acute distress, well developed and well nourished. Obese.   HENT: No hearing loss noted.    Respiratory: No signs of respiratory distress. Respiration rhythm and depth normal.  Clear to auscultation.   Cardiovascular:  Jugular Venous Pressure: Normal  Heart Rate and Rhythm: Normal, Heart Sounds: Normal S1 and S2. No S3 or S4 noted.  Murmurs: No murmurs noted. No rubs, thrills, or gallops.   Lower Extremities: No edema noted.  Musculoskeletal: Normal movement of extremities.  Skin: General appearance normal.    Psychiatric: Patient alert and oriented to person, place, and time. Speech and behavior appropriate. Normal mood and affect.       ECG 12 Lead    Date/Time: 6/28/2024 10:33 AM  Performed by: Carolyn Ontiveros APRN    Authorized by: Carolyn Ontiveros APRN  Comparison: compared with previous ECG from 5/22/2024  Similar to previous ECG  Rhythm: sinus rhythm  Rate: normal  BPM: 66  Conduction: conduction normal  ST Segments: ST segments normal  T Waves: T waves normal  QRS axis: normal    Clinical impression: normal ECG            Assessment:       Diagnosis Plan   1. Acute CVA (cerebrovascular accident)  Case Request Cath Lab: Loop insertion      2. PFO (patent foramen ovale)  Case Request Cath Lab: Loop insertion      3. Transient elevated blood pressure  Case Request Cath Lab: Loop insertion      4. Class 2 severe obesity with serious comorbidity and body mass index (BMI) of 38.0 to 38.9 in adult, unspecified obesity type               Plan:       She recently presented to the hospital with right-sided weakness and was found to have an acute left-sided stroke with evidence of right internal capsule stroke.  Etiology unknown.  Follow-up 14-day Holter monitor showed no atrial fibrillation.  She has remained on aspirin and rivaroxaban.  She decreased " her atorvastatin from 80 mg to 40 mg daily because of myalgias.  Her blood pressure has been transiently elevated and I recommended that she monitor at home.  Typically after stroke, we do not want to be too aggressive with blood pressure lowering and I will defer to neurology.    I discussed her plan of care with Dr. Shadi Anguiano.  She is recommended proceeding with implantable loop recorder.  We will monitor the loop recorder for 6 months and if we do not see atrial fibrillation, we will then proceed with PFO closure.  Patient would like to have this completed in December because she will have met her deductible for the year.  All this was well explained and printable literature was given to her patient on PFO and loop recorder.    As always, it has been a pleasure to participate in your patient's care. Thank you.         Sincerely,         ANABELA Genao  [facility]      Dictated utilizing Dragon Dictation  I spent 32 minutes reviewing her medical records/testing/previous office notes/labs, face-to-face interaction with patient, physical examination, formulating the plan of care, and discussion of plan of care with patient.

## 2024-06-28 NOTE — PROGRESS NOTES
Date of Office Visit: 2024  Encounter Provider: ANABELA Garcia  Place of Service: [facility]  Patient Name: Linda Castano  :1970  Primary Cardiologist: Dr. Shadi Anguiano    Chief Complaint   Patient presents with    Stroke    PFO    Hospital Follow Up Visit   :     HPI: Linda Castano is a 53 y.o. female who presents today for hospital follow-up visit.  She is a new patient to me and I reviewed her medical records.    She has a previous history of hypertension and with weight loss she was able to come off her medications. Her father had a stroke in his 70s.    In May 2024, she presented with right-sided weakness and was found to have an acute left frontal parietal subcortical stroke with evidence of an old right internal capsule stroke.  Her BP upon arrival was 175/93 and she was in a normal sinus rhythm.  Cholesterol panel was elevated.  CLAYTON was normal except for small PFO with a tiny amount of shunting.  Venous duplex negative for DVT.  Dr. Anguiano advised against hormone replacement, recommended outpatient sleep study, hypercoagulable panel, and 14-day outpatient monitor.  She was discharged on low-dose rivaroxaban and aspirin.    14-day monitor showed normal sinus rhythm, average heart rate 76 bpm, and rare ectopy.    She presents today for follow-up visit and her  is accompanying her.  We reviewed the hospital records, testing, and new diagnoses.  She denies any residual symptoms after her stroke.  Driving in today, she experienced a left-sided chest pressure while sitting.  This is the first time she ever experienced this and it has resolved.  She reports shortness of breath 1 time and some fatigue.  She has been experiencing some left hip joint pain that is been sharp and sometimes her left toes fall asleep.  She denies palpitations, dizziness, syncope, or bleeding.  I recommended that she start monitoring her blood pressures at home.  She  started experiencing some myalgias and joint pain and has decreased her atorvastatin from 80 mg to 40 mg daily.      Past Medical History:   Diagnosis Date    Hypertension     improved with weight loss    Obesity     PFO (patent foramen ovale)     Stroke        History reviewed. No pertinent surgical history.    Social History     Socioeconomic History    Marital status:    Tobacco Use    Smoking status: Never    Smokeless tobacco: Never   Vaping Use    Vaping status: Never Used   Substance and Sexual Activity    Alcohol use: Yes     Comment: occasionally, 1x a month    Drug use: Never    Sexual activity: Defer       Family History   Problem Relation Age of Onset    Hypertension Mother     Stroke Father     Hypertension Father     Hyperlipidemia Father        The following portion of the patient's history were reviewed and updated as appropriate: past medical history, past surgical history, past social history, past family history, allergies, current medications, and problem list.    Review of Systems   Constitutional: Positive for malaise/fatigue.   Eyes: Negative.    Cardiovascular:  Positive for chest pain.   Hematologic/Lymphatic: Negative.    Musculoskeletal:  Positive for joint pain and myalgias.   Neurological: Negative.        Allergies   Allergen Reactions    Sulfa Antibiotics Hives         Current Outpatient Medications:     Aspirin Low Dose 81 MG EC tablet, , Disp: , Rfl:     atorvastatin (LIPITOR) 80 MG tablet, Take 0.5 tablets by mouth Every Night. Takes 1/2 TABLET AT NIGHT, Disp: , Rfl:     CVS TRIPLE MAGNESIUM COMPLEX PO, Take 300 mg by mouth Every Night., Disp: , Rfl:     Rivaroxaban (XARELTO) 2.5 MG tablet, Take 1 tablet by mouth 2 (Two) Times a Day With Meals. Indications: Stoke with PFO, Disp: 60 tablet, Rfl: 0         Objective:     Vitals:    06/28/24 1031 06/28/24 1116   BP: 140/88 140/90   BP Location: Right arm Left arm   Patient Position: Sitting Sitting   Cuff Size: Adult    Pulse:  "66    Weight: 95.9 kg (211 lb 6.4 oz)    Height: 157.5 cm (62.01\")      Body mass index is 38.66 kg/m².    PHYSICAL EXAM:    Vitals Reviewed.   General Appearance: No acute distress, well developed and well nourished. Obese.   HENT: No hearing loss noted.    Respiratory: No signs of respiratory distress. Respiration rhythm and depth normal.  Clear to auscultation.   Cardiovascular:  Jugular Venous Pressure: Normal  Heart Rate and Rhythm: Normal, Heart Sounds: Normal S1 and S2. No S3 or S4 noted.  Murmurs: No murmurs noted. No rubs, thrills, or gallops.   Lower Extremities: No edema noted.  Musculoskeletal: Normal movement of extremities.  Skin: General appearance normal.    Psychiatric: Patient alert and oriented to person, place, and time. Speech and behavior appropriate. Normal mood and affect.       ECG 12 Lead    Date/Time: 6/28/2024 10:33 AM  Performed by: Carolyn Ontiveros APRN    Authorized by: Carolyn Ontiveros APRN  Comparison: compared with previous ECG from 5/22/2024  Similar to previous ECG  Rhythm: sinus rhythm  Rate: normal  BPM: 66  Conduction: conduction normal  ST Segments: ST segments normal  T Waves: T waves normal  QRS axis: normal    Clinical impression: normal ECG            Assessment:       Diagnosis Plan   1. Acute CVA (cerebrovascular accident)  Case Request Cath Lab: Loop insertion      2. PFO (patent foramen ovale)  Case Request Cath Lab: Loop insertion      3. Transient elevated blood pressure  Case Request Cath Lab: Loop insertion      4. Class 2 severe obesity with serious comorbidity and body mass index (BMI) of 38.0 to 38.9 in adult, unspecified obesity type               Plan:       She recently presented to the hospital with right-sided weakness and was found to have an acute left-sided stroke with evidence of right internal capsule stroke.  Etiology unknown.  Follow-up 14-day Holter monitor showed no atrial fibrillation.  She has remained on aspirin and rivaroxaban.  She decreased " her atorvastatin from 80 mg to 40 mg daily because of myalgias.  Her blood pressure has been transiently elevated and I recommended that she monitor at home.  Typically after stroke, we do not want to be too aggressive with blood pressure lowering and I will defer to neurology.    I discussed her plan of care with Dr. Shadi Anguiano.  She is recommended proceeding with implantable loop recorder.  We will monitor the loop recorder for 6 months and if we do not see atrial fibrillation, we will then proceed with PFO closure.  Patient would like to have this completed in December because she will have met her deductible for the year.  All this was well explained and printable literature was given to her patient on PFO and loop recorder.    As always, it has been a pleasure to participate in your patient's care. Thank you.         Sincerely,         ANABELA Genao  [facility]      Dictated utilizing Dragon Dictation  I spent 32 minutes reviewing her medical records/testing/previous office notes/labs, face-to-face interaction with patient, physical examination, formulating the plan of care, and discussion of plan of care with patient.      Male

## 2024-07-09 ENCOUNTER — HOSPITAL ENCOUNTER (OUTPATIENT)
Facility: HOSPITAL | Age: 54
Setting detail: HOSPITAL OUTPATIENT SURGERY
Discharge: HOME | End: 2024-07-09
Attending: INTERNAL MEDICINE | Admitting: INTERNAL MEDICINE
Payer: COMMERCIAL

## 2024-07-09 VITALS
WEIGHT: 208 LBS | DIASTOLIC BLOOD PRESSURE: 70 MMHG | HEIGHT: 62 IN | BODY MASS INDEX: 38.28 KG/M2 | HEART RATE: 68 BPM | RESPIRATION RATE: 16 BRPM | TEMPERATURE: 97.7 F | OXYGEN SATURATION: 100 % | SYSTOLIC BLOOD PRESSURE: 122 MMHG

## 2024-07-09 DIAGNOSIS — I63.9 ACUTE CVA (CEREBROVASCULAR ACCIDENT): ICD-10-CM

## 2024-07-09 DIAGNOSIS — R03.0 TRANSIENT ELEVATED BLOOD PRESSURE: ICD-10-CM

## 2024-07-09 DIAGNOSIS — Q21.12 PFO (PATENT FORAMEN OVALE): ICD-10-CM

## 2024-07-09 PROCEDURE — 33285 INSJ SUBQ CAR RHYTHM MNTR: CPT | Performed by: INTERNAL MEDICINE

## 2024-07-09 PROCEDURE — C1764 EVENT RECORDER, CARDIAC: HCPCS | Performed by: INTERNAL MEDICINE

## 2024-07-09 DEVICE — SYS ICM REVEAL LINQ W/MONTR/PATNT ASS/LINQ11 44.8X7.2X4MM: Type: IMPLANTABLE DEVICE | Status: FUNCTIONAL

## 2024-07-09 RX ORDER — ASPIRIN 81 MG/1
81 TABLET, COATED ORAL DAILY
Start: 2024-07-10

## 2024-07-09 RX ORDER — SODIUM CHLORIDE 9 MG/ML
75 INJECTION, SOLUTION INTRAVENOUS CONTINUOUS
Status: DISCONTINUED | OUTPATIENT
Start: 2024-07-09 | End: 2024-07-09 | Stop reason: HOSPADM

## 2024-07-09 RX ORDER — SODIUM CHLORIDE 0.9 % (FLUSH) 0.9 %
10 SYRINGE (ML) INJECTION AS NEEDED
Status: DISCONTINUED | OUTPATIENT
Start: 2024-07-09 | End: 2024-07-09 | Stop reason: HOSPADM

## 2024-07-09 RX ORDER — NITROGLYCERIN 0.4 MG/1
0.4 TABLET SUBLINGUAL
Status: DISCONTINUED | OUTPATIENT
Start: 2024-07-09 | End: 2024-07-09 | Stop reason: HOSPADM

## 2024-07-09 NOTE — DISCHARGE INSTRUCTIONS
YOU MAY REMOVE YOUR DRESSING TOMORROW, KEEP STERI STRIPS IN PLACE THEY WILL FALL ODD ON THEIR OWN. MONITOR FOR SIGNS OF INFECTION AND KEEP INCISION SITE DRY UNTIL FOLLOW UP APPOINTMENT

## 2024-07-12 ENCOUNTER — TELEPHONE (OUTPATIENT)
Dept: CARDIOLOGY | Facility: CLINIC | Age: 54
End: 2024-07-12
Payer: COMMERCIAL

## 2024-07-12 NOTE — TELEPHONE ENCOUNTER
I spoke with pt and gave her rec's.    Thank you,    Audra MAHMOOD , RN  Triage [facility] – [place], [place]  07/12/24 15:48 EDT

## 2024-07-12 NOTE — TELEPHONE ENCOUNTER
Please call patient.  She had a loop recorder implant on Tuesday.  Just see how she is doing.  Keep scheduled appointment with me at the end of July.  Thank you

## 2024-07-12 NOTE — TELEPHONE ENCOUNTER
Pt reports she's doing pretty good.  The soreness has been worse than she expected.  She said that it's healing well.  She denies any acute issues.    Thank you,    Audra MAHMOOD RN  Triage [facility] [place]  07/12/24 13:56 EDT

## 2024-07-22 NOTE — PROGRESS NOTES
CC: Stroke follow-up    HPI:  Linda Castano is a  53 y.o.  right-handed female with history of obesity and hypertension (taken off medications many years ago following weight loss) who is being seen in follow-up today for stroke.  She is accompanied by her .    She was admitted to [facility] in May 2024 with complaints of sudden onset right hand and right foot weakness.  She was not on any medications prior to arrival and did not smoke, use illicit drugs and only drink alcohol occasionally.  She had not seen a PCP since 2020.  CTA head/neck showed no significant stenosis, occlusion, or aneurysm.  MRI brain showed acute left frontoparietal subcortical stroke extending into the corona radiata as well as old caudate/corona radiata/internal capsule stroke.  She was also reporting some chronic intermittent numbness and tingling in her hands so MRI C-spine with and without contrast was completed which showed mild cervical spondylosis with some generative changes, see full report.  LDL was 177, hemoglobin A1c 5.8%, B12 445, TSH 4.1, folate 7.63.  2D echo was unremarkable, CLAYTON on also completed which showed EF of 60%, normal left atrial cavity size, no left atrial thrombus or mass, there was no spontaneous echo contrast present.  She was found to have a small PFO with bidirectional shunting.  She was started on low-dose aspirin and Xarelto 2.5 mg twice daily as per [facility] trial per Dr. Hewitt.  She was also started on Lipitor 80 mg daily.  Zio patch was ordered which was unremarkable and she has since had a loop recorder placed.  Her blood pressure was initially elevated at 175/93 but normalized throughout her hospitalization and she did not require any antihypertensives.    Today she reports all of her stroke symptoms resolved after discharge.  No new TIA or stroke symptoms since discharge.  She is taking aspirin 81 mg daily and Xarelto 2.5 mg twice daily.  She reports she initially had a  nosebleed just after discharge but has not had any other bleeding issues or recurrence of nosebleed.  Hypercoagulable panel was negative.  Loop recorder has been negative thus far and the plan is to consider PFO closure and at the end of this year if loop recorder is negative.  She did develop some myalgias, mainly in the hips, and Lipitor 80 mg which have resolved on the lower dose of Lipitor 40 mg.    She recently had a blood pressure cuff but has not started checking her blood pressure yet.  She has lost 14 pounds by cutting out refined sugar.  She is trying to walk more.  She does occasionally snore per her  but has never been tested for sleep apnea.    Today she reports that she occasionally has left groin/hip pain with physical activity and around the time of the hip pain she will develop some numbness of the second and third digits in her left foot.  We discussed if this recurs/becomes more frequent would recommend trial of PT prior to imaging.      Past Medical History:   Diagnosis Date    Hyperlipidemia 5/22/24    Hypertension     improved with weight loss    Obesity     PFO (patent foramen ovale)     Stroke          Past Surgical History:   Procedure Laterality Date    CARDIAC ELECTROPHYSIOLOGY PROCEDURE N/A 07/09/2024    Procedure: Loop insertion  medtronic;  Surgeon: Mahin Crooks MD;  Location: [facility] INVASIVE LOCATION;  Service: Cardiovascular;  Laterality: N/A;           Current Outpatient Medications:     Aspirin Low Dose 81 MG EC tablet, Take 1 tablet by mouth Daily., Disp: , Rfl:     atorvastatin (LIPITOR) 80 MG tablet, Take 0.5 tablets by mouth Every Night. Takes 1/2 TABLET AT NIGHT, Disp: , Rfl:     CVS TRIPLE MAGNESIUM COMPLEX PO, Take 300 mg by mouth Every Night., Disp: , Rfl:     Rivaroxaban (XARELTO) 2.5 MG tablet, Take 1 tablet by mouth 2 (Two) Times a Day With Meals. Indications: Stoke with PFO, Disp: , Rfl:       Family History   Problem Relation Age of Onset     "Hypertension Mother     Stroke Father     Hypertension Father     Hyperlipidemia Father     Heart disease Maternal Grandfather          Social History     Socioeconomic History    Marital status:    Tobacco Use    Smoking status: Never    Smokeless tobacco: Never   Vaping Use    Vaping status: Never Used   Substance and Sexual Activity    Alcohol use: Not Currently     Comment: No longer drinking alcohol    Drug use: Never    Sexual activity: Not Currently         Allergies   Allergen Reactions    Sulfa Antibiotics Hives             Physical Exam:  Vitals:    07/31/24 0802   BP: 130/78   Pulse: 73   SpO2: 99%   Weight: 94 kg (207 lb 4.8 oz)   Height: 157.5 cm (62\")     Orthostatic BP:    Body mass index is 37.92 kg/m².    General appearance: Well developed, well nourished, well groomed, alert and cooperative.   HEENT: Normocephalic.   Cardiac: Regular rate and rhythm. No murmurs.   Peripheral Vasculature: Radial pulses are equal and symmetric.  Chest Exam: Clear to auscultation bilaterally, no wheezes, no rhonchi.  Extremities: Normal, no edema.   Skin: No rashes or birthmarks.     Higher integrative function: Oriented to time, place, person, intact recent and remote memory, attention span, concentration and language. Spontaneous speech, fund of vocabulary are normal.   CN II: Normal visual fields.   CN III IV VI: Extraocular movements are full without nystagmus. Pupils are equal, round, and reactive to light.   CN V: Normal facial sensation.  CN VII: Facial movements are symmetric, no weakness.   CN VIII: Auditory acuity is normal.   CN IX & X: Symmetric palatal movement.   CN XI: Sternocleidomastoid and trapezius are normal. No weakness.   CN XII: The tongue is midline. No atrophy or fasciculations.   Motor: Normal muscle strength, bulk, and tone in upper and lower extremities. No fasciculations, rigidity, spasticity or abnormal movements.   Sensation: Normal light touch in all extremities. .   Station and " "gait: Normal gait and station.   Coordination: Finger to nose test showed no dysmetria. Heel to shin normal.       Results:      Lab Results   Component Value Date    GLUCOSE 97 05/23/2024    BUN 18 05/23/2024    CREATININE 0.82 05/23/2024    EGFRIFAFRI >60 12/15/2020    BCR 22.0 05/23/2024    CO2 23.8 05/23/2024    CALCIUM 9.2 05/23/2024    ALBUMIN 4.0 05/23/2024    LABIL2 1.3 12/15/2020    AST 17 05/23/2024    ALT 25 05/23/2024       Lab Results   Component Value Date    WBC 7.15 05/23/2024    HGB 11.8 (L) 05/23/2024    HCT 36.3 05/23/2024    MCV 84.8 05/23/2024     05/23/2024         .No results found for: \"RPR\"      Lab Results   Component Value Date    TSH 4.100 05/22/2024         Lab Results   Component Value Date    VHWKSGCG22 445 05/22/2024         Lab Results   Component Value Date    FOLATE 7.63 05/22/2024         Lab Results   Component Value Date    HGBA1C 5.80 (H) 05/22/2024         Lab Results   Component Value Date    GLUCOSE 97 05/23/2024    BUN 18 05/23/2024    CREATININE 0.82 05/23/2024    EGFRIFAFRI >60 12/15/2020    BCR 22.0 05/23/2024    K 4.1 05/23/2024    CO2 23.8 05/23/2024    CALCIUM 9.2 05/23/2024    ALBUMIN 4.0 05/23/2024    LABIL2 1.3 12/15/2020    AST 17 05/23/2024    ALT 25 05/23/2024         Lab Results   Component Value Date    WBC 7.15 05/23/2024    HGB 11.8 (L) 05/23/2024    HCT 36.3 05/23/2024    MCV 84.8 05/23/2024     05/23/2024             Assessment/Plan:     Linda was seen today for cryptogenic stroke.  Diagnoses and all orders for this visit:    1. History of stroke without residual deficits (Primary)    2. At risk for sleep apnea  -     Ambulatory Referral to Sleep Medicine    3. PFO (patent foramen ovale)    4. Mixed hyperlipidemia      For stroke prevention:   Continue low-dose aspirin and Xarelto 2.5 mg twice daily, can consider stopping Xarelto if loop negative and PFO closed  Blood pressure control to <130/80, recommend she check her blood pressure " regularly and bring recordings to PCP and cardiology appointments   Goal LDL <70-recommend high dose statins-continue Lipitor 40 mg daily, had side effects on 80 mg    Serum glucose < 140   Continue reduced carbohydrate/refined sugar intake to help aid in weight loss   Recommend regular physical activity   Call 911 for stroke any stroke symptoms    Recommend RICO evaluation, sleep referral placed      Follow-up in 6 months or sooner if needed      Total time: Greater than 40 minutes        Dictated utilizing Dragon dictation.

## 2024-07-30 ENCOUNTER — OFFICE VISIT (OUTPATIENT)
Dept: CARDIOLOGY | Facility: CLINIC | Age: 54
End: 2024-07-30
Payer: COMMERCIAL

## 2024-07-30 ENCOUNTER — CLINICAL SUPPORT NO REQUIREMENTS (OUTPATIENT)
Age: 54
End: 2024-07-30
Payer: COMMERCIAL

## 2024-07-30 VITALS
HEART RATE: 75 BPM | BODY MASS INDEX: 38.05 KG/M2 | DIASTOLIC BLOOD PRESSURE: 84 MMHG | HEIGHT: 62 IN | SYSTOLIC BLOOD PRESSURE: 138 MMHG | WEIGHT: 206.8 LBS

## 2024-07-30 DIAGNOSIS — I63.9 ACUTE CVA (CEREBROVASCULAR ACCIDENT): Primary | ICD-10-CM

## 2024-07-30 DIAGNOSIS — Q21.12 PFO (PATENT FORAMEN OVALE): ICD-10-CM

## 2024-07-30 DIAGNOSIS — Z95.818 IMPLANTABLE LOOP RECORDER PRESENT: ICD-10-CM

## 2024-07-30 NOTE — PROGRESS NOTES
Date of Office Visit: 2024  Encounter Provider: ANABELA Garcia  Place of Service: [facility] C[facility]IOLOG[facility]  Patient Name: Linda Castano  :1970  Primary Cardiologist: Dr. Shadi Anguiano    Chief Complaint   Patient presents with    s/p loop recorder placement   :     HPI: Linda Castano is a 53 y.o. female who presents today for follow-up after loop recorder implantation.  I reviewed her medical records.    She has a previous history of hypertension and with weight loss she was able to come off her medications. Her father had a stroke in his 70s.     In May 2024, she presented with right-sided weakness and was found to have an acute left frontal parietal subcortical stroke with evidence of an old right internal capsule stroke.  Her BP upon arrival was 175/93 and she was in a normal sinus rhythm.  Cholesterol panel was elevated.  CLAYTON was normal except for small PFO with a tiny amount of shunting.  Venous duplex negative for DVT.  Dr. Anguiano advised against hormone replacement, recommended outpatient sleep study, hypercoagulable panel, and 14-day outpatient monitor.  She was discharged on low-dose rivaroxaban and aspirin.     14-day monitor showed normal sinus rhythm, average heart rate 76 bpm, and rare ectopy.  She followed up with me outpatient in Dr. Anguiano recommended loop recorder implantation.  If we do not see atrial fibrillation in the first 6 months, we will most likely proceed with PFO closure.     On 2024, she underwent loop recorder implantation by Dr. Jerardo Crooks.    She presents today for follow-up visit.  She had some postprocedure soreness and that has resolved.  She experiences some occasional dyspnea with walking.  She denies chest pain, PND, orthopnea, edema, palpitations, dizziness, or syncope.  Blood pressure and heart rate are stable.      Past Medical History:   Diagnosis Date    Hyperlipidemia 24    Hypertension     improved with weight  loss    Implantable loop recorder present 07/2024    Obesity     PFO (patent foramen ovale)     Stroke        Past Surgical History:   Procedure Laterality Date    CARDIAC ELECTROPHYSIOLOGY PROCEDURE N/A 07/09/2024    Procedure: Loop insertion  medtronic;  Surgeon: Mahin Crooks MD;  Location: [facility] INVASIVE LOCATION;  Service: Cardiovascular;  Laterality: N/A;       Social History     Socioeconomic History    Marital status:    Tobacco Use    Smoking status: Never    Smokeless tobacco: Never   Vaping Use    Vaping status: Never Used   Substance and Sexual Activity    Alcohol use: Not Currently     Comment: No longer drinking alcohol    Drug use: Never    Sexual activity: Not Currently       Family History   Problem Relation Age of Onset    Hypertension Mother     Stroke Father     Hypertension Father     Hyperlipidemia Father     Heart disease Maternal Grandfather        The following portion of the patient's history were reviewed and updated as appropriate: past medical history, past surgical history, past social history, past family history, allergies, current medications, and problem list.    Review of Systems   Constitutional: Negative.   Cardiovascular:  Positive for dyspnea on exertion.   Respiratory: Negative.     Hematologic/Lymphatic: Negative.    Neurological: Negative.        Allergies   Allergen Reactions    Sulfa Antibiotics Hives         Current Outpatient Medications:     Aspirin Low Dose 81 MG EC tablet, Take 1 tablet by mouth Daily., Disp: , Rfl:     atorvastatin (LIPITOR) 80 MG tablet, Take 0.5 tablets by mouth Every Night. Takes 1/2 TABLET AT NIGHT, Disp: , Rfl:     CVS TRIPLE MAGNESIUM COMPLEX PO, Take 300 mg by mouth Every Night., Disp: , Rfl:     Rivaroxaban (XARELTO) 2.5 MG tablet, Take 1 tablet by mouth 2 (Two) Times a Day With Meals. Indications: Stoke with PFO, Disp: , Rfl:          Objective:     Vitals:    07/30/24 1040   BP: 138/84   Pulse: 75   Weight: 93.8 kg  "(206 lb 12.8 oz)   Height: 157.5 cm (62\")     Body mass index is 37.82 kg/m².    PHYSICAL EXAM:    Vitals Reviewed.   General Appearance: No acute distress, well developed and well nourished. Obese.   HENT: No hearing loss noted.    Respiratory: No signs of respiratory distress. Respiration rhythm and depth normal.  Clear to auscultation.   Cardiovascular:  Jugular Venous Pressure: Normal  Heart Rate and Rhythm: Normal, Heart Sounds: Normal S1 and S2. No S3 or S4 noted.  Murmurs: No murmurs noted. No rubs, thrills, or gallops.   Lower Extremities: No edema noted.  Musculoskeletal: Normal movement of extremities.  Skin: General appearance normal.    Psychiatric: Patient alert and oriented to person, place, and time. Speech and behavior appropriate. Normal mood and affect.   Left incision site well-healed.    Procedures    EKG not completed today.      Assessment:       Diagnosis Plan   1. Acute CVA (cerebrovascular accident)        2. Implantable loop recorder present        3. PFO (patent foramen ovale)               Plan:       1.  Acute left-sided stroke with evidence of right internal capsule stroke in June 2024.  Etiology unknown.  She was noted to have a PFO.  Follow-up Zio patch monitor did not show atrial fibrillation.  Continue aspirin, rivaroxaban, and atorvastatin.    2.  She recently underwent loop recorder implantation and is doing well.  I will set her up in the heart rhythm clinic and she will see them today.    3.  PFO: Dr. Anguiano said if we did not see atrial fibrillation in the next 6 months, then we could proceed with PFO closure.  Patient would like to have this done in December if possible.    4.  She will follow-up with Dr. Anguiano in October 2024.      As always, it has been a pleasure to participate in your patient's care. Thank you.         Sincerely,         ANABELA Genao  [facility]      Dictated utilizing Dragon Dictation    "

## 2024-07-31 ENCOUNTER — OFFICE VISIT (OUTPATIENT)
Dept: NEUROLOGY | Facility: CLINIC | Age: 54
End: 2024-07-31
Payer: COMMERCIAL

## 2024-07-31 VITALS
OXYGEN SATURATION: 99 % | HEART RATE: 73 BPM | HEIGHT: 62 IN | DIASTOLIC BLOOD PRESSURE: 78 MMHG | WEIGHT: 207.3 LBS | SYSTOLIC BLOOD PRESSURE: 130 MMHG | BODY MASS INDEX: 38.15 KG/M2

## 2024-07-31 DIAGNOSIS — E78.2 MIXED HYPERLIPIDEMIA: ICD-10-CM

## 2024-07-31 DIAGNOSIS — Q21.12 PFO (PATENT FORAMEN OVALE): ICD-10-CM

## 2024-07-31 DIAGNOSIS — Z91.89 AT RISK FOR SLEEP APNEA: ICD-10-CM

## 2024-07-31 DIAGNOSIS — Z86.73 HISTORY OF STROKE WITHOUT RESIDUAL DEFICITS: Primary | ICD-10-CM

## 2024-07-31 NOTE — LETTER
July 31, 2024       No Recipients    Patient: Linda Castano   YOB: 1970   Date of Visit: 7/31/2024     Dear ANABELA Campos:       Thank you for referring Linda Castano to me for evaluation. Below are the relevant portions of my assessment and plan of care.    If you have questions, please do not hesitate to call me. I look forward to following Linda along with you.         Sincerely,        ANABELA Bentley        CC:   No Recipients    Rossana Pryor APRN  07/31/24 0850  Sign when Signing Visit  CC: Stroke follow-up    HPI:  Linda Castano is a  53 y.o.  right-handed female with history of obesity and hypertension (taken off medications many years ago following weight loss) who is being seen in follow-up today for stroke.  She is accompanied by her .    She was admitted to [facility] in May 2024 with complaints of sudden onset right hand and right foot weakness.  She was not on any medications prior to arrival and did not smoke, use illicit drugs and only drink alcohol occasionally.  She had not seen a PCP since 2020.  CTA head/neck showed no significant stenosis, occlusion, or aneurysm.  MRI brain showed acute left frontoparietal subcortical stroke extending into the corona radiata as well as old caudate/corona radiata/internal capsule stroke.  She was also reporting some chronic intermittent numbness and tingling in her hands so MRI C-spine with and without contrast was completed which showed mild cervical spondylosis with some generative changes, see full report.  LDL was 177, hemoglobin A1c 5.8%, B12 445, TSH 4.1, folate 7.63.  2D echo was unremarkable, CLAYTON on also completed which showed EF of 60%, normal left atrial cavity size, no left atrial thrombus or mass, there was no spontaneous echo contrast present.  She was found to have a small PFO with bidirectional shunting.  She was started on low-dose aspirin and Xarelto 2.5 mg twice daily as per  Compass trial per Dr. Hewitt.  She was also started on Lipitor 80 mg daily.  Zio patch was ordered which was unremarkable and she has since had a loop recorder placed.  Her blood pressure was initially elevated at 175/93 but normalized throughout her hospitalization and she did not require any antihypertensives.    Today she reports all of her stroke symptoms resolved after discharge.  No new TIA or stroke symptoms since discharge.  She is taking aspirin 81 mg daily and Xarelto 2.5 mg twice daily.  She reports she initially had a nosebleed just after discharge but has not had any other bleeding issues or recurrence of nosebleed.  Hypercoagulable panel was negative.  Loop recorder has been negative thus far and the plan is to consider PFO closure and at the end of this year if loop recorder is negative.  She did develop some myalgias, mainly in the hips, and Lipitor 80 mg which have resolved on the lower dose of Lipitor 40 mg.    She recently had a blood pressure cuff but has not started checking her blood pressure yet.  She has lost 14 pounds by cutting out refined sugar.  She is trying to walk more.  She does occasionally snore per her  but has never been tested for sleep apnea.    Today she reports that she occasionally has left groin/hip pain with physical activity and around the time of the hip pain she will develop some numbness of the second and third digits in her left foot.  We discussed if this recurs/becomes more frequent would recommend trial of PT prior to imaging.      Past Medical History:   Diagnosis Date   • Hyperlipidemia 5/22/24   • Hypertension     improved with weight loss   • Obesity    • PFO (patent foramen ovale)    • Stroke          Past Surgical History:   Procedure Laterality Date   • CARDIAC ELECTROPHYSIOLOGY PROCEDURE N/A 07/09/2024    Procedure: Loop insertion  medtronic;  Surgeon: Mahin Crooks MD;  Location: [facility] INVASIVE LOCATION;  Service: Cardiovascular;   "Laterality: N/A;           Current Outpatient Medications:   •  Aspirin Low Dose 81 MG EC tablet, Take 1 tablet by mouth Daily., Disp: , Rfl:   •  atorvastatin (LIPITOR) 80 MG tablet, Take 0.5 tablets by mouth Every Night. Takes 1/2 TABLET AT NIGHT, Disp: , Rfl:   •  CVS TRIPLE MAGNESIUM COMPLEX PO, Take 300 mg by mouth Every Night., Disp: , Rfl:   •  Rivaroxaban (XARELTO) 2.5 MG tablet, Take 1 tablet by mouth 2 (Two) Times a Day With Meals. Indications: Stoke with PFO, Disp: , Rfl:       Family History   Problem Relation Age of Onset   • Hypertension Mother    • Stroke Father    • Hypertension Father    • Hyperlipidemia Father    • Heart disease Maternal Grandfather          Social History     Socioeconomic History   • Marital status:    Tobacco Use   • Smoking status: Never   • Smokeless tobacco: Never   Vaping Use   • Vaping status: Never Used   Substance and Sexual Activity   • Alcohol use: Not Currently     Comment: No longer drinking alcohol   • Drug use: Never   • Sexual activity: Not Currently         Allergies   Allergen Reactions   • Sulfa Antibiotics Hives             Physical Exam:  Vitals:    07/31/24 0802   BP: 130/78   Pulse: 73   SpO2: 99%   Weight: 94 kg (207 lb 4.8 oz)   Height: 157.5 cm (62\")     Orthostatic BP:    Body mass index is 37.92 kg/m².    General appearance: Well developed, well nourished, well groomed, alert and cooperative.   HEENT: Normocephalic.   Cardiac: Regular rate and rhythm. No murmurs.   Peripheral Vasculature: Radial pulses are equal and symmetric.  Chest Exam: Clear to auscultation bilaterally, no wheezes, no rhonchi.  Extremities: Normal, no edema.   Skin: No rashes or birthmarks.     Higher integrative function: Oriented to time, place, person, intact recent and remote memory, attention span, concentration and language. Spontaneous speech, fund of vocabulary are normal.   CN II: Normal visual fields.   CN III IV VI: Extraocular movements are full without nystagmus. " "Pupils are equal, round, and reactive to light.   CN V: Normal facial sensation.  CN VII: Facial movements are symmetric, no weakness.   CN VIII: Auditory acuity is normal.   CN IX & X: Symmetric palatal movement.   CN XI: Sternocleidomastoid and trapezius are normal. No weakness.   CN XII: The tongue is midline. No atrophy or fasciculations.   Motor: Normal muscle strength, bulk, and tone in upper and lower extremities. No fasciculations, rigidity, spasticity or abnormal movements.   Sensation: Normal light touch in all extremities. .   Station and gait: Normal gait and station.   Coordination: Finger to nose test showed no dysmetria. Heel to shin normal.       Results:      Lab Results   Component Value Date    GLUCOSE 97 05/23/2024    BUN 18 05/23/2024    CREATININE 0.82 05/23/2024    EGFRIFAFRI >60 12/15/2020    BCR 22.0 05/23/2024    CO2 23.8 05/23/2024    CALCIUM 9.2 05/23/2024    ALBUMIN 4.0 05/23/2024    LABIL2 1.3 12/15/2020    AST 17 05/23/2024    ALT 25 05/23/2024       Lab Results   Component Value Date    WBC 7.15 05/23/2024    HGB 11.8 (L) 05/23/2024    HCT 36.3 05/23/2024    MCV 84.8 05/23/2024     05/23/2024         .No results found for: \"RPR\"      Lab Results   Component Value Date    TSH 4.100 05/22/2024         Lab Results   Component Value Date    GKPWEBCB07 445 05/22/2024         Lab Results   Component Value Date    FOLATE 7.63 05/22/2024         Lab Results   Component Value Date    HGBA1C 5.80 (H) 05/22/2024         Lab Results   Component Value Date    GLUCOSE 97 05/23/2024    BUN 18 05/23/2024    CREATININE 0.82 05/23/2024    EGFRIFAFRI >60 12/15/2020    BCR 22.0 05/23/2024    K 4.1 05/23/2024    CO2 23.8 05/23/2024    CALCIUM 9.2 05/23/2024    ALBUMIN 4.0 05/23/2024    LABIL2 1.3 12/15/2020    AST 17 05/23/2024    ALT 25 05/23/2024         Lab Results   Component Value Date    WBC 7.15 05/23/2024    HGB 11.8 (L) 05/23/2024    HCT 36.3 05/23/2024    MCV 84.8 05/23/2024     " 05/23/2024             Assessment/Plan:     Linda was seen today for cryptogenic stroke.  Diagnoses and all orders for this visit:    1. History of stroke without residual deficits (Primary)    2. At risk for sleep apnea  -     Ambulatory Referral to Sleep Medicine    3. PFO (patent foramen ovale)    4. Mixed hyperlipidemia      For stroke prevention:   Continue low-dose aspirin and Xarelto 2.5 mg twice daily, can consider stopping Xarelto if loop negative and PFO closed  Blood pressure control to <130/80, recommend she check her blood pressure regularly and bring recordings to PCP and cardiology appointments   Goal LDL <70-recommend high dose statins-continue Lipitor 40 mg daily, had side effects on 80 mg    Serum glucose < 140   Continue reduced carbohydrate/refined sugar intake to help aid in weight loss   Recommend regular physical activity   Call 911 for stroke any stroke symptoms    Recommend RICO evaluation, sleep referral placed      Follow-up in 6 months or sooner if needed      Total time: Greater than 40 minutes        Dictated utilizing Dragon dictation.

## 2024-08-01 PROBLEM — Z95.818 IMPLANTABLE LOOP RECORDER PRESENT: Status: ACTIVE | Noted: 2024-07-01

## 2024-08-11 PROCEDURE — 93298 REM INTERROG DEV EVAL SCRMS: CPT | Performed by: STUDENT IN AN ORGANIZED HEALTH CARE EDUCATION/TRAINING PROGRAM

## 2024-10-02 NOTE — PROGRESS NOTES
RM:________     PCP: Sintia Kemp APRN    : 1970  AGE: 54 y.o.  EST PATIENT     REASON FOR VISIT/  CC:        BP Readings from Last 3 Encounters:   24 130/78   24 138/84   24 122/70      Wt Readings from Last 3 Encounters:   24 94 kg (207 lb 4.8 oz)   24 93.8 kg (206 lb 12.8 oz)   24 94.3 kg (208 lb)        WT: ____________ BP: __________L __________R HR______    CHEST PAIN: _____________    SOA: _____________PALPS: _______________     LIGHTHEADED: ___________FATIGUE: ________________ EDEMA __________    ALLERGIES:Sulfa antibiotics SMOKING HISTORY:  Social History     Tobacco Use    Smoking status: Never    Smokeless tobacco: Never   Vaping Use    Vaping status: Never Used   Substance Use Topics    Alcohol use: Not Currently     Comment: No longer drinking alcohol    Drug use: Never     CAFFEINE USE_________________  ALCOHOL ______________________

## 2024-10-21 ENCOUNTER — OFFICE VISIT (OUTPATIENT)
Dept: CARDIOLOGY | Facility: CLINIC | Age: 54
End: 2024-10-21
Payer: COMMERCIAL

## 2024-10-21 ENCOUNTER — TELEPHONE (OUTPATIENT)
Dept: CARDIOLOGY | Facility: HOSPITAL | Age: 54
End: 2024-10-21
Payer: COMMERCIAL

## 2024-10-21 VITALS
DIASTOLIC BLOOD PRESSURE: 92 MMHG | WEIGHT: 201.4 LBS | BODY MASS INDEX: 37.06 KG/M2 | HEIGHT: 62 IN | SYSTOLIC BLOOD PRESSURE: 136 MMHG | HEART RATE: 88 BPM

## 2024-10-21 DIAGNOSIS — R03.0 TRANSIENT ELEVATED BLOOD PRESSURE: ICD-10-CM

## 2024-10-21 DIAGNOSIS — I63.9 ACUTE CVA (CEREBROVASCULAR ACCIDENT): Primary | ICD-10-CM

## 2024-10-21 DIAGNOSIS — Q21.12 PFO (PATENT FORAMEN OVALE): ICD-10-CM

## 2024-10-21 DIAGNOSIS — Z95.818 IMPLANTABLE LOOP RECORDER PRESENT: ICD-10-CM

## 2024-10-21 PROCEDURE — 99214 OFFICE O/P EST MOD 30 MIN: CPT | Performed by: INTERNAL MEDICINE

## 2024-10-21 NOTE — TELEPHONE ENCOUNTER
Dr Anguiano referred pt to [facility] for PFO closure eval. I called pt and left a message for return call. RTRN

## 2024-10-21 NOTE — PROGRESS NOTES
Date of Office Visit: 10/21/24  Encounter Provider: Shadi Anguiano MD  Place of Service: [facility] CARDIOLOGY  Patient Name: Linda Castano  :1970    Chief Complaint   Patient presents with    Acute CVA (cerebrovascular accident)   :     HPI:     Ms. Castano is 54 y.o. and presents today in follow up. I have reviewed prior notes and there are no changes except for any new updates described below. I have also reviewed any information entered into the medical record by the patient or by ancillary staff.     She presented in May 2024 with an acute left frontoparietal stroke. Imaging also showed an old right internal capsule stroke. A CLAYTON was performed and showed a PFO; it was otherwise normal. Outpatient rhythm monitoring was unremarkable. She underwent loop implantation in 2024.    She is on low dose aspirin and low dose rivaroxaban. She has not had bleeding issues. She feels well.     Of note, she had a CACS in 2024 that was normal (score of zero).       Past Medical History:   Diagnosis Date    Hyperlipidemia 24    Hypertension     improved with weight loss    Implantable loop recorder present 2024    Obesity     PFO (patent foramen ovale)     Stroke        Past Surgical History:   Procedure Laterality Date    CARDIAC ELECTROPHYSIOLOGY PROCEDURE N/A 2024    Procedure: Loop insertion  medtronic;  Surgeon: Mahin Crooks MD;  Location: [facility] INVASIVE LOCATION;  Service: Cardiovascular;  Laterality: N/A;       Social History     Socioeconomic History    Marital status:    Tobacco Use    Smoking status: Never     Passive exposure: Never    Smokeless tobacco: Never   Vaping Use    Vaping status: Never Used   Substance and Sexual Activity    Alcohol use: Not Currently     Comment: No longer drinking alcohol    Drug use: Never    Sexual activity: Not Currently       Family History   Problem Relation Age of Onset    Hypertension Mother   "   Stroke Father     Hypertension Father     Hyperlipidemia Father     Heart disease Maternal Grandfather        Review of Systems   All other systems reviewed and are negative.      Allergies   Allergen Reactions    Sulfa Antibiotics Hives         Current Outpatient Medications:     Aspirin Low Dose 81 MG EC tablet, Take 1 tablet by mouth Daily., Disp: , Rfl:     CVS TRIPLE MAGNESIUM COMPLEX PO, Take 300 mg by mouth Every Night., Disp: , Rfl:     Rivaroxaban (XARELTO) 2.5 MG tablet, Take 1 tablet by mouth 2 (Two) Times a Day With Meals. Indications: Stoke with PFO, Disp: , Rfl:       Objective:     Vitals:    10/21/24 1415   BP: 136/92   BP Location: Left arm   Pulse: 88   Weight: 91.4 kg (201 lb 6.4 oz)   Height: 157.5 cm (62\")     Body mass index is 36.84 kg/m².    Vitals reviewed.   Constitutional:       Appearance: Well-developed and not in distress.   Eyes:      Conjunctiva/sclera: Conjunctivae normal.   HENT:      Head: Normocephalic.      Nose: Nose normal.   Neck:      Thyroid: Thyroid normal.      Vascular: No JVD. JVD normal.      Lymphadenopathy: No cervical adenopathy.   Pulmonary:      Effort: Pulmonary effort is normal.      Breath sounds: Normal breath sounds.   Cardiovascular:      Normal rate. Regular rhythm.      Murmurs: There is no murmur.   Pulses:     Intact distal pulses.   Edema:     Peripheral edema absent.   Musculoskeletal: Normal range of motion.      Cervical back: Normal range of motion. Skin:     General: Skin is warm and dry.   Neurological:      Mental Status: Alert and oriented to person, place, and time.      Cranial Nerves: No cranial nerve deficit.   Psychiatric:         Behavior: Behavior normal.         Thought Content: Thought content normal.         Judgment: Judgment normal.         Procedures      Assessment:       Diagnosis Plan   1. Acute CVA (cerebrovascular accident)        2. PFO (patent foramen ovale)        3. Implantable loop recorder present        4. Transient " elevated blood pressure               Plan:       Mrs Castano had an acute stroke in May 2024. She does not have cerebrovascular disease. She has a PFO; her heart is otherwise structurally normal. She is s/p loop recorder implantation.  I feel that it is reasonable to close this in December if there has been no evidence of atrial fibrillation in the interim.  I will go ahead and get her into see Dr. Crooks in the office  for a consult and then I will check her device in early December and try to get her on the Cath Lab schedule at that time.  We discussed the procedure as well as the need to change her blood thinners to dual antiplatelet therapy afterwards followed eventually by low-dose aspirin alone.  She will also require antibiotics for SBE prophylaxis prior to dental work for 12 months.    Sincerely,       Shadi Anguiano MD

## 2024-10-24 NOTE — TELEPHONE ENCOUNTER
I spoke with Linda & we discussed getting an appt for PFO evaluation. She is interested in scheduling an appt to discuss further. I was able to answer her questions and have provided my contact #. I've requested an appt with Dr Crooks. RTRN

## 2024-11-18 ENCOUNTER — OFFICE VISIT (OUTPATIENT)
Age: 54
End: 2024-11-18
Payer: COMMERCIAL

## 2024-11-18 ENCOUNTER — TRANSCRIBE ORDERS (OUTPATIENT)
Dept: CARDIOLOGY | Facility: CLINIC | Age: 54
End: 2024-11-18
Payer: COMMERCIAL

## 2024-11-18 VITALS
SYSTOLIC BLOOD PRESSURE: 126 MMHG | HEART RATE: 74 BPM | BODY MASS INDEX: 36.07 KG/M2 | DIASTOLIC BLOOD PRESSURE: 72 MMHG | WEIGHT: 196 LBS | HEIGHT: 62 IN

## 2024-11-18 DIAGNOSIS — Z13.6 SCREENING FOR ISCHEMIC HEART DISEASE: ICD-10-CM

## 2024-11-18 DIAGNOSIS — I63.9 ACUTE CVA (CEREBROVASCULAR ACCIDENT): Primary | ICD-10-CM

## 2024-11-18 DIAGNOSIS — Q21.12 PFO WITH ATRIAL SEPTAL ANEURYSM: ICD-10-CM

## 2024-11-18 DIAGNOSIS — I25.3 PFO WITH ATRIAL SEPTAL ANEURYSM: ICD-10-CM

## 2024-11-18 DIAGNOSIS — Z01.810 PRE-OPERATIVE CARDIOVASCULAR EXAMINATION: Primary | ICD-10-CM

## 2024-11-18 PROCEDURE — 93000 ELECTROCARDIOGRAM COMPLETE: CPT | Performed by: INTERNAL MEDICINE

## 2024-11-18 PROCEDURE — 99204 OFFICE O/P NEW MOD 45 MIN: CPT | Performed by: INTERNAL MEDICINE

## 2024-11-18 RX ORDER — MULTIPLE VITAMINS W/ MINERALS TAB 9MG-400MCG
1 TAB ORAL DAILY
COMMUNITY

## 2024-11-18 NOTE — H&P (VIEW-ONLY)
Linda Castano  1970  Date of Office Visit: 11/18/24  Encounter Provider: Mahin Crooks MD  Place of Service: [facility] CARDIOLOGY      CHIEF COMPLAINT:  PFO associated CVA      HISTORY OF PRESENT ILLNESS:  52 yo woman with a previous history of HTN who in May 2024 with right-sided weakness.  She was found to have an acute left frontal parietal stroke with evidence of an old right internal capsule stroke.  She did not require lytics as her symptoms  improved on their own.  Her blood pressure was 175/93 mmHg upon arrival.  Neurology recommended permissive hypertension initially, and her systolic pressure is now in the 130s.  She was in sinus rhythm.  Her total cholesterol 258, HDL 60, .   She underwent evaluation documented below including an MRI of the brain showing the old lacunar infarct but also acute infarction of the superior left frontoparietal CVA.  She had a transesophageal echocardiogram which I have reviewed showing a patent foramen ovale with bidirectional shunting.  No evidence of left atrial appendage thrombus.  She has had a Linq recording device in placed in July with no evidence of atrial fibrillation documented since that time    Review of Systems   Constitutional: Negative for fever and malaise/fatigue.   HENT:  Negative for nosebleeds and sore throat.    Eyes:  Negative for blurred vision and double vision.   Cardiovascular:  Negative for chest pain, claudication, palpitations and syncope.   Respiratory:  Negative for cough, shortness of breath and snoring.    Endocrine: Negative for cold intolerance, heat intolerance and polydipsia.   Skin:  Negative for itching, poor wound healing and rash.   Musculoskeletal:  Negative for joint pain, joint swelling, muscle weakness and myalgias.   Gastrointestinal:  Negative for abdominal pain, melena, nausea and vomiting.   Neurological:  Negative for light-headedness, loss of balance, seizures, vertigo  "and weakness.   Psychiatric/Behavioral:  Negative for altered mental status and depression.          Past Medical History:   Diagnosis Date    Hyperlipidemia 5/22/24    Hypertension     improved with weight loss    Implantable loop recorder present 07/2024    Obesity     PFO (patent foramen ovale)     Stroke        The following portions of the patient's history were reviewed and updated as appropriate: Social history , Family history, and Surgical history     Current Outpatient Medications on File Prior to Visit   Medication Sig Dispense Refill    Aspirin Low Dose 81 MG EC tablet Take 1 tablet by mouth Daily.      CVS TRIPLE MAGNESIUM COMPLEX PO Take 300 mg by mouth Every Night.      multivitamin with minerals tablet tablet Take 1 tablet by mouth Daily.      Rivaroxaban (XARELTO) 2.5 MG tablet Take 1 tablet by mouth 2 (Two) Times a Day With Meals. Indications: Stoke with PFO       No current facility-administered medications on file prior to visit.       Allergies   Allergen Reactions    Sulfa Antibiotics Hives       Vitals:    11/18/24 1251   BP: 126/72   Pulse: 74   Weight: 88.9 kg (196 lb)   Height: 157.5 cm (62\")     Body mass index is 35.85 kg/m².   Constitutional:       Appearance: Well-developed.   Eyes:      General: No scleral icterus.     Conjunctiva/sclera: Conjunctivae normal.   HENT:      Head: Normocephalic and atraumatic.   Neck:      Thyroid: No thyromegaly.      Vascular: Normal carotid pulses. No carotid bruit, hepatojugular reflux or JVD.      Trachea: No tracheal deviation.   Pulmonary:      Effort: No respiratory distress.      Breath sounds: Normal breath sounds. No decreased breath sounds. No wheezing. No rhonchi. No rales.   Chest:      Chest wall: Not tender to palpatation.   Cardiovascular:      Normal rate. Regular rhythm.      No gallop.    Pulses:     Carotid: 2+ bilaterally.     Radial: 2+ bilaterally.     Femoral: 2+ bilaterally.     Dorsalis pedis: 2+ bilaterally.     Posterior " tibial: 2+ bilaterally.  Edema:     Peripheral edema absent.   Abdominal:      General: Bowel sounds are normal. There is no distension.      Palpations: Abdomen is soft.      Tenderness: There is no abdominal tenderness.   Musculoskeletal:         General: No deformity.      Cervical back: Normal range of motion and neck supple. Skin:     Findings: No erythema or rash.   Neurological:      Mental Status: Alert and oriented to person, place, and time.      Sensory: No sensory deficit.   Psychiatric:         Behavior: Behavior normal.           Lab Results   Component Value Date    WBC 7.15 05/23/2024    HGB 11.8 (L) 05/23/2024    HCT 36.3 05/23/2024    MCV 84.8 05/23/2024     05/23/2024       Lab Results   Component Value Date    GLUCOSE 97 05/23/2024    BUN 18 05/23/2024    CREATININE 0.82 05/23/2024     05/23/2024    K 4.1 05/23/2024     05/23/2024    CALCIUM 9.2 05/23/2024    PROTEINTOT 6.5 05/23/2024    ALBUMIN 4.0 05/23/2024    ALT 25 05/23/2024    AST 17 05/23/2024    ALKPHOS 86 05/23/2024    BILITOT <0.2 05/23/2024    GLOB 2.5 05/23/2024    AGRATIO 1.6 05/23/2024    BCR 22.0 05/23/2024    ANIONGAP 8.2 05/23/2024    EGFR 85.7 05/23/2024       Lab Results   Component Value Date    GLUCOSE 97 05/23/2024    CALCIUM 9.2 05/23/2024     05/23/2024    K 4.1 05/23/2024    CO2 23.8 05/23/2024     05/23/2024    BUN 18 05/23/2024    CREATININE 0.82 05/23/2024    EGFR 85.7 05/23/2024    BCR 22.0 05/23/2024    ANIONGAP 8.2 05/23/2024       Lab Results   Component Value Date    CHOL 250 (H) 05/22/2024    TRIG 79 05/22/2024    HDL 60 05/22/2024     (H) 05/22/2024         ECG 12 Lead    Date/Time: 11/18/2024 1:07 PM  Performed by: Mahin Crooks MD    Authorized by: Mahin Crooks MD  Comparison: compared with previous ECG from 6/28/2024  Similar to previous ECG  Rhythm: sinus rhythm  Rate: normal  QRS axis: normal    Clinical impression: normal ECG             Results for  orders placed during the hospital encounter of 05/22/24    Adult Transesophageal Echo (CLAYTON) W/ Cont if Necessary Per Protocol    Interpretation Summary    Left ventricular systolic function is normal. Estimated left ventricular EF = 60% Normal left ventricular cavity size and wall thickness noted. All left ventricular wall segments contract normally. Left ventricular diastolic function was not assessed. No evidence of left ventricular thrombus or mass present.    Normal left atrial cavity size noted. No evidence of left atrial thrombus or mass present. There is no spontaneous echo contrast present. Left atrial appendage was found to be multilobar in nature. The left atrial appendage was visualized through multiple planes. Doppler interrogation shows normal flow within the left atrial appendage. No evidence of a left atrial appendage thrombus was present. Small patent foramen ovale present with bi-directional shunting. Saline test results are positive.      IMPRESSION:  1. There is a 15 x 8 x 8 mm old lacunar infarct extending from the body  of the right caudate nucleus into the mid right corona radiata region  and superior aspect of the genu of the right internal capsule.     2. There is a triangular area of acute infarction measuring 10 x 11 mm  in size extending from the superior left frontal parietal subcortical  white matter into the posterior left corona radiata region. The  remainder of the MRI of the brain is normal.    DISCUSSION/SUMMARY  54-year-old female who presents to me for evaluation of PFO closure.  She presented back in May 2024 with acute right-sided weakness and was found to have an acute left frontal parietal stroke and old right internal capsular stroke.  She was also noted to have significant hyperlipidemia at that time.  She underwent a very thorough evaluation with no evidence of significant intracerebral plaquing or carotid artery disease.  She had no DVT documented.  She had a  transesophageal echocardiogram showing a patent foramen ovale with bidirectional shunting and a positive saline test as well.  She has worn her Linq recording device since July and has had no atrial fibrillation.    1.  PFO associated stroke: PFO closure recommended.  Risks and benefits of the procedure have been discussed with the patient.  - I would recommend continuing Xarelto for now.  This will be stopped a day prior to her procedure.  - She will be on aspirin and Plavix for 3 months after that and aspirin lifelong after the discontinuation of Plavix.    2.  Hyperlipidemia: Defer to primary cardiologist.  She quit taking her statin as she stated that it made her have increased tingling of her extremities.

## 2024-11-18 NOTE — PROGRESS NOTES
Linda Castano  1970  Date of Office Visit: 11/18/24  Encounter Provider: Mahin Crooks MD  Place of Service: [facility] CARDIOLOGY      CHIEF COMPLAINT:  PFO associated CVA      HISTORY OF PRESENT ILLNESS:  52 yo woman with a previous history of HTN who in May 2024 with right-sided weakness.  She was found to have an acute left frontal parietal stroke with evidence of an old right internal capsule stroke.  She did not require lytics as her symptoms  improved on their own.  Her blood pressure was 175/93 mmHg upon arrival.  Neurology recommended permissive hypertension initially, and her systolic pressure is now in the 130s.  She was in sinus rhythm.  Her total cholesterol 258, HDL 60, .   She underwent evaluation documented below including an MRI of the brain showing the old lacunar infarct but also acute infarction of the superior left frontoparietal CVA.  She had a transesophageal echocardiogram which I have reviewed showing a patent foramen ovale with bidirectional shunting.  No evidence of left atrial appendage thrombus.  She has had a Linq recording device in placed in July with no evidence of atrial fibrillation documented since that time    Review of Systems   Constitutional: Negative for fever and malaise/fatigue.   HENT:  Negative for nosebleeds and sore throat.    Eyes:  Negative for blurred vision and double vision.   Cardiovascular:  Negative for chest pain, claudication, palpitations and syncope.   Respiratory:  Negative for cough, shortness of breath and snoring.    Endocrine: Negative for cold intolerance, heat intolerance and polydipsia.   Skin:  Negative for itching, poor wound healing and rash.   Musculoskeletal:  Negative for joint pain, joint swelling, muscle weakness and myalgias.   Gastrointestinal:  Negative for abdominal pain, melena, nausea and vomiting.   Neurological:  Negative for light-headedness, loss of balance, seizures, vertigo  "and weakness.   Psychiatric/Behavioral:  Negative for altered mental status and depression.          Past Medical History:   Diagnosis Date    Hyperlipidemia 5/22/24    Hypertension     improved with weight loss    Implantable loop recorder present 07/2024    Obesity     PFO (patent foramen ovale)     Stroke        The following portions of the patient's history were reviewed and updated as appropriate: Social history , Family history, and Surgical history     Current Outpatient Medications on File Prior to Visit   Medication Sig Dispense Refill    Aspirin Low Dose 81 MG EC tablet Take 1 tablet by mouth Daily.      CVS TRIPLE MAGNESIUM COMPLEX PO Take 300 mg by mouth Every Night.      multivitamin with minerals tablet tablet Take 1 tablet by mouth Daily.      Rivaroxaban (XARELTO) 2.5 MG tablet Take 1 tablet by mouth 2 (Two) Times a Day With Meals. Indications: Stoke with PFO       No current facility-administered medications on file prior to visit.       Allergies   Allergen Reactions    Sulfa Antibiotics Hives       Vitals:    11/18/24 1251   BP: 126/72   Pulse: 74   Weight: 88.9 kg (196 lb)   Height: 157.5 cm (62\")     Body mass index is 35.85 kg/m².   Constitutional:       Appearance: Well-developed.   Eyes:      General: No scleral icterus.     Conjunctiva/sclera: Conjunctivae normal.   HENT:      Head: Normocephalic and atraumatic.   Neck:      Thyroid: No thyromegaly.      Vascular: Normal carotid pulses. No carotid bruit, hepatojugular reflux or JVD.      Trachea: No tracheal deviation.   Pulmonary:      Effort: No respiratory distress.      Breath sounds: Normal breath sounds. No decreased breath sounds. No wheezing. No rhonchi. No rales.   Chest:      Chest wall: Not tender to palpatation.   Cardiovascular:      Normal rate. Regular rhythm.      No gallop.    Pulses:     Carotid: 2+ bilaterally.     Radial: 2+ bilaterally.     Femoral: 2+ bilaterally.     Dorsalis pedis: 2+ bilaterally.     Posterior " tibial: 2+ bilaterally.  Edema:     Peripheral edema absent.   Abdominal:      General: Bowel sounds are normal. There is no distension.      Palpations: Abdomen is soft.      Tenderness: There is no abdominal tenderness.   Musculoskeletal:         General: No deformity.      Cervical back: Normal range of motion and neck supple. Skin:     Findings: No erythema or rash.   Neurological:      Mental Status: Alert and oriented to person, place, and time.      Sensory: No sensory deficit.   Psychiatric:         Behavior: Behavior normal.           Lab Results   Component Value Date    WBC 7.15 05/23/2024    HGB 11.8 (L) 05/23/2024    HCT 36.3 05/23/2024    MCV 84.8 05/23/2024     05/23/2024       Lab Results   Component Value Date    GLUCOSE 97 05/23/2024    BUN 18 05/23/2024    CREATININE 0.82 05/23/2024     05/23/2024    K 4.1 05/23/2024     05/23/2024    CALCIUM 9.2 05/23/2024    PROTEINTOT 6.5 05/23/2024    ALBUMIN 4.0 05/23/2024    ALT 25 05/23/2024    AST 17 05/23/2024    ALKPHOS 86 05/23/2024    BILITOT <0.2 05/23/2024    GLOB 2.5 05/23/2024    AGRATIO 1.6 05/23/2024    BCR 22.0 05/23/2024    ANIONGAP 8.2 05/23/2024    EGFR 85.7 05/23/2024       Lab Results   Component Value Date    GLUCOSE 97 05/23/2024    CALCIUM 9.2 05/23/2024     05/23/2024    K 4.1 05/23/2024    CO2 23.8 05/23/2024     05/23/2024    BUN 18 05/23/2024    CREATININE 0.82 05/23/2024    EGFR 85.7 05/23/2024    BCR 22.0 05/23/2024    ANIONGAP 8.2 05/23/2024       Lab Results   Component Value Date    CHOL 250 (H) 05/22/2024    TRIG 79 05/22/2024    HDL 60 05/22/2024     (H) 05/22/2024         ECG 12 Lead    Date/Time: 11/18/2024 1:07 PM  Performed by: Mahin Crooks MD    Authorized by: Mahin Crooks MD  Comparison: compared with previous ECG from 6/28/2024  Similar to previous ECG  Rhythm: sinus rhythm  Rate: normal  QRS axis: normal    Clinical impression: normal ECG             Results for  orders placed during the hospital encounter of 05/22/24    Adult Transesophageal Echo ([facility]) W/ Cont if Necessary Per Protocol    Interpretation Summary    Left ventricular systolic function is normal. Estimated left ventricular EF = 60% Normal left ventricular cavity size and wall thickness noted. All left ventricular wall segments contract normally. Left ventricular diastolic function was not assessed. No evidence of left ventricular thrombus or mass present.    Normal left atrial cavity size noted. No evidence of left atrial thrombus or mass present. There is no spontaneous echo contrast present. Left atrial appendage was found to be multilobar in nature. The left atrial appendage was visualized through multiple planes. Doppler interrogation shows normal flow within the left atrial appendage. No evidence of a left atrial appendage thrombus was present. Small patent foramen ovale present with bi-directional shunting. Saline test results are positive.      IMPRESSION:  1. There is a 15 x 8 x 8 mm old lacunar infarct extending from the body  of the right caudate nucleus into the mid right corona radiata region  and superior aspect of the genu of the right internal capsule.     2. There is a triangular area of acute infarction measuring 10 x 11 mm  in size extending from the superior left frontal parietal subcortical  white matter into the posterior left corona radiata region. The  remainder of the MRI of the brain is normal.    DISCUSSION/SUMMARY  54-year-old female who presents to me for evaluation of PFO closure.  She presented back in May 2024 with acute right-sided weakness and was found to have an acute left frontal parietal stroke and old right internal capsular stroke.  She was also noted to have significant hyperlipidemia at that time.  She underwent a very thorough evaluation with no evidence of significant intracerebral plaquing or carotid artery disease.  She had no DVT documented.  She had a  transesophageal echocardiogram showing a patent foramen ovale with bidirectional shunting and a positive saline test as well.  She has worn her Linq recording device since July and has had no atrial fibrillation.    1.  PFO associated stroke: PFO closure recommended.  Risks and benefits of the procedure have been discussed with the patient.  - I would recommend continuing Xarelto for now.  This will be stopped a day prior to her procedure.  - She will be on aspirin and Plavix for 3 months after that and aspirin lifelong after the discontinuation of Plavix.    2.  Hyperlipidemia: Defer to primary cardiologist.  She quit taking her statin as she stated that it made her have increased tingling of her extremities.

## 2024-12-06 ENCOUNTER — LAB (OUTPATIENT)
Dept: LAB | Facility: HOSPITAL | Age: 54
End: 2024-12-06
Payer: COMMERCIAL

## 2024-12-06 DIAGNOSIS — Z01.810 PRE-OPERATIVE CARDIOVASCULAR EXAMINATION: ICD-10-CM

## 2024-12-06 DIAGNOSIS — Z13.6 SCREENING FOR ISCHEMIC HEART DISEASE: ICD-10-CM

## 2024-12-06 LAB
ANION GAP SERPL CALCULATED.3IONS-SCNC: 9.4 MMOL/L (ref 5–15)
BUN SERPL-MCNC: 16 MG/DL (ref 6–20)
BUN/CREAT SERPL: 19 (ref 7–25)
CALCIUM SPEC-SCNC: 9.3 MG/DL (ref 8.6–10.5)
CHLORIDE SERPL-SCNC: 101 MMOL/L (ref 98–107)
CO2 SERPL-SCNC: 25.6 MMOL/L (ref 22–29)
CREAT SERPL-MCNC: 0.84 MG/DL (ref 0.57–1)
DEPRECATED RDW RBC AUTO: 44.3 FL (ref 37–54)
EGFRCR SERPLBLD CKD-EPI 2021: 82.7 ML/MIN/1.73
ERYTHROCYTE [DISTWIDTH] IN BLOOD BY AUTOMATED COUNT: 13.8 % (ref 12.3–15.4)
GLUCOSE SERPL-MCNC: 98 MG/DL (ref 65–99)
HCT VFR BLD AUTO: 39.2 % (ref 34–46.6)
HGB BLD-MCNC: 12.5 G/DL (ref 12–15.9)
MCH RBC QN AUTO: 27.9 PG (ref 26.6–33)
MCHC RBC AUTO-ENTMCNC: 31.9 G/DL (ref 31.5–35.7)
MCV RBC AUTO: 87.5 FL (ref 79–97)
PLATELET # BLD AUTO: 307 10*3/MM3 (ref 140–450)
PMV BLD AUTO: 9.4 FL (ref 6–12)
POTASSIUM SERPL-SCNC: 5 MMOL/L (ref 3.5–5.2)
RBC # BLD AUTO: 4.48 10*6/MM3 (ref 3.77–5.28)
SODIUM SERPL-SCNC: 136 MMOL/L (ref 136–145)
WBC NRBC COR # BLD AUTO: 7.44 10*3/MM3 (ref 3.4–10.8)

## 2024-12-06 PROCEDURE — 85027 COMPLETE CBC AUTOMATED: CPT

## 2024-12-06 PROCEDURE — 36415 COLL VENOUS BLD VENIPUNCTURE: CPT

## 2024-12-06 PROCEDURE — 80048 BASIC METABOLIC PNL TOTAL CA: CPT

## 2024-12-13 ENCOUNTER — HOSPITAL ENCOUNTER (OUTPATIENT)
Facility: HOSPITAL | Age: 54
Setting detail: HOSPITAL OUTPATIENT SURGERY
Discharge: HOME | End: 2024-12-13
Attending: INTERNAL MEDICINE | Admitting: INTERNAL MEDICINE
Payer: COMMERCIAL

## 2024-12-13 VITALS
TEMPERATURE: 97.8 F | RESPIRATION RATE: 18 BRPM | HEIGHT: 62 IN | SYSTOLIC BLOOD PRESSURE: 116 MMHG | WEIGHT: 190 LBS | BODY MASS INDEX: 34.96 KG/M2 | HEART RATE: 78 BPM | DIASTOLIC BLOOD PRESSURE: 78 MMHG | OXYGEN SATURATION: 97 %

## 2024-12-13 DIAGNOSIS — Q21.12 PFO WITH ATRIAL SEPTAL ANEURYSM: ICD-10-CM

## 2024-12-13 DIAGNOSIS — I25.3 PFO WITH ATRIAL SEPTAL ANEURYSM: ICD-10-CM

## 2024-12-13 PROCEDURE — C1894 INTRO/SHEATH, NON-LASER: HCPCS | Performed by: INTERNAL MEDICINE

## 2024-12-13 PROCEDURE — 25010000002 PROTAMINE SULFATE PER 10 MG: Performed by: INTERNAL MEDICINE

## 2024-12-13 PROCEDURE — 25010000002 LIDOCAINE 2% SOLUTION: Performed by: INTERNAL MEDICINE

## 2024-12-13 PROCEDURE — 25810000003 SODIUM CHLORIDE 0.9 % SOLUTION: Performed by: INTERNAL MEDICINE

## 2024-12-13 PROCEDURE — 25010000002 MIDAZOLAM PER 1 MG: Performed by: INTERNAL MEDICINE

## 2024-12-13 PROCEDURE — 93662 INTRACARDIAC ECG (ICE): CPT | Performed by: INTERNAL MEDICINE

## 2024-12-13 PROCEDURE — C1769 GUIDE WIRE: HCPCS | Performed by: INTERNAL MEDICINE

## 2024-12-13 PROCEDURE — 25010000002 HEPARIN (PORCINE) PER 1000 UNITS: Performed by: INTERNAL MEDICINE

## 2024-12-13 PROCEDURE — C1760 CLOSURE DEV, VASC: HCPCS | Performed by: INTERNAL MEDICINE

## 2024-12-13 PROCEDURE — C1817 SEPTAL DEFECT IMP SYS: HCPCS | Performed by: INTERNAL MEDICINE

## 2024-12-13 PROCEDURE — 93580 TRANSCATH CLOSURE OF ASD: CPT | Performed by: INTERNAL MEDICINE

## 2024-12-13 PROCEDURE — 25010000002 CEFAZOLIN PER 500 MG: Performed by: INTERNAL MEDICINE

## 2024-12-13 PROCEDURE — C1887 CATHETER, GUIDING: HCPCS | Performed by: INTERNAL MEDICINE

## 2024-12-13 PROCEDURE — C1759 CATH, INTRA ECHOCARDIOGRAPHY: HCPCS | Performed by: INTERNAL MEDICINE

## 2024-12-13 PROCEDURE — 25010000002 FENTANYL CITRATE (PF) 50 MCG/ML SOLUTION: Performed by: INTERNAL MEDICINE

## 2024-12-13 DEVICE — OCCL PFO AMPLATZER TALISMAN CRV/45DEG 9F 30MM: Type: IMPLANTABLE DEVICE | Site: CORONARY | Status: FUNCTIONAL

## 2024-12-13 RX ORDER — MIDAZOLAM HYDROCHLORIDE 1 MG/ML
INJECTION, SOLUTION INTRAMUSCULAR; INTRAVENOUS
Status: DISCONTINUED | OUTPATIENT
Start: 2024-12-13 | End: 2024-12-13 | Stop reason: HOSPADM

## 2024-12-13 RX ORDER — CLOPIDOGREL BISULFATE 75 MG/1
TABLET ORAL
Status: DISCONTINUED | OUTPATIENT
Start: 2024-12-13 | End: 2024-12-13 | Stop reason: HOSPADM

## 2024-12-13 RX ORDER — LIDOCAINE HYDROCHLORIDE 20 MG/ML
INJECTION, SOLUTION INFILTRATION; PERINEURAL
Status: DISCONTINUED | OUTPATIENT
Start: 2024-12-13 | End: 2024-12-13 | Stop reason: HOSPADM

## 2024-12-13 RX ORDER — SODIUM CHLORIDE 9 MG/ML
75 INJECTION, SOLUTION INTRAVENOUS CONTINUOUS
Status: DISCONTINUED | OUTPATIENT
Start: 2024-12-13 | End: 2024-12-13 | Stop reason: HOSPADM

## 2024-12-13 RX ORDER — NITROGLYCERIN 0.4 MG/1
0.4 TABLET SUBLINGUAL
Status: CANCELLED | OUTPATIENT
Start: 2024-12-13

## 2024-12-13 RX ORDER — CLOPIDOGREL BISULFATE 75 MG/1
75 TABLET ORAL DAILY
Qty: 90 TABLET | Refills: 0 | Status: SHIPPED | OUTPATIENT
Start: 2024-12-13

## 2024-12-13 RX ORDER — HYDROCODONE BITARTRATE AND ACETAMINOPHEN 5; 325 MG/1; MG/1
1 TABLET ORAL EVERY 4 HOURS PRN
Status: CANCELLED | OUTPATIENT
Start: 2024-12-13

## 2024-12-13 RX ORDER — HEPARIN SODIUM 1000 [USP'U]/ML
INJECTION, SOLUTION INTRAVENOUS; SUBCUTANEOUS
Status: DISCONTINUED | OUTPATIENT
Start: 2024-12-13 | End: 2024-12-13 | Stop reason: HOSPADM

## 2024-12-13 RX ORDER — ONDANSETRON 2 MG/ML
4 INJECTION INTRAMUSCULAR; INTRAVENOUS EVERY 6 HOURS PRN
Status: CANCELLED | OUTPATIENT
Start: 2024-12-13

## 2024-12-13 RX ORDER — FENTANYL CITRATE 50 UG/ML
INJECTION, SOLUTION INTRAMUSCULAR; INTRAVENOUS
Status: DISCONTINUED | OUTPATIENT
Start: 2024-12-13 | End: 2024-12-13 | Stop reason: HOSPADM

## 2024-12-13 RX ORDER — ASPIRIN 325 MG
TABLET ORAL
Status: DISCONTINUED | OUTPATIENT
Start: 2024-12-13 | End: 2024-12-13 | Stop reason: HOSPADM

## 2024-12-13 RX ORDER — ONDANSETRON 4 MG/1
4 TABLET, ORALLY DISINTEGRATING ORAL EVERY 6 HOURS PRN
Status: CANCELLED | OUTPATIENT
Start: 2024-12-13

## 2024-12-13 RX ORDER — ACETAMINOPHEN 325 MG/1
650 TABLET ORAL EVERY 4 HOURS PRN
Status: CANCELLED | OUTPATIENT
Start: 2024-12-13

## 2024-12-13 RX ORDER — SODIUM CHLORIDE 0.9 % (FLUSH) 0.9 %
10 SYRINGE (ML) INJECTION EVERY 12 HOURS SCHEDULED
Status: DISCONTINUED | OUTPATIENT
Start: 2024-12-13 | End: 2024-12-13 | Stop reason: HOSPADM

## 2024-12-13 RX ORDER — PROTAMINE SULFATE 10 MG/ML
INJECTION, SOLUTION INTRAVENOUS
Status: DISCONTINUED | OUTPATIENT
Start: 2024-12-13 | End: 2024-12-13 | Stop reason: HOSPADM

## 2024-12-13 RX ADMIN — SODIUM CHLORIDE 75 ML/HR: 9 INJECTION, SOLUTION INTRAVENOUS at 07:39

## 2024-12-13 NOTE — Clinical Note
A 9 fr sheath was successfully inserted using micropuncture technique with ultrasound guidance into the right femoral vein. Sheath insertion not delayed.

## 2024-12-13 NOTE — DISCHARGE INSTRUCTIONS
[facility]  [address] [place] [address]      PFO CLOSURE (Femoral Approach) After Care     Refer to this sheet in the next few weeks. These instructions provide you with information on caring for yourself after your procedure. Your health care provider may also give you more specific instructions. Your treatment has been planned according to current medical practices, but problems sometimes occur. Call your health care provider if you have any problems or questions after your procedure.      What to Expect After the Procedure:  After your procedure, it is typical to have the following sensations:  Minor discomfort or tenderness and a small bump at the catheter insertion site. The bump should usually decrease in size and tenderness within 1 to 2 weeks.  Any bruising will usually fade within 2 to 4 weeks.    Home Care Instructions:  Do not apply powder or lotion to the site.  Do not take baths, swim, or use a hot tub until your health care provider approves and the site is completely healed.  Do not bend, squat, or lift anything over 20 lb (9 kg) or as directed by your health care provider. However, we recommend lifting nothing heavier than a gallon of milk.    You may shower 24 hours after the procedure. Remove the bandage (dressing) and gently wash the site with plain soap and water. Gently pat the site dry. You may apply a band aid daily for 2 days if desired.    Inspect the site at least twice daily.  Increase your fluid intake for the next 2 days.    Limit your activity for the first 48 hours. .    Avoid strenuous activity for 1 week or as advised by your physician.    Follow instructions about when you can drive or return to work as directed by your physician.    Hold direct pressure over the site when you cough, sneeze, laugh or change positions.  Do this for the next 2 days.    Do not operate machinery or power tools for 24 hours.  A responsible adult should be with you for the first  24 hours after you arrive home. Do not make any important legal decisions or sign legal papers for 24 hours.  Do not drink alcohol for 24 hours.  Metformin or any medications containing Metformin should not be taken for 48 hours after your procedure.      Call Your Doctor If:  You have drainage (other than a small amount of blood on the dressing).  You have chills or a fever > 101.  You have redness, warmth, swelling(larger than a walnut), or pain at the insertion site  You develop chest pain or shortness of breath, feel faint, or pass out.  You develop pain, discoloration, coldness, numbness, tingling, or severe bruising in the leg that held the catheter.  You develop bleeding from any other place, such as the bowels.  You have heavy bleeding from the site, hold pressure on the site for 20 minutes.  If the bleeding stops, apply a fresh bandage and call your cardiologist.  However, if you continue to have bleeding, call 911.  You have any symptoms of a stroke.  Remember BE FAST  B-balance. Sudden trouble walking or loss of balance.  E-eyes.  Sudden changes in how you see or a sudden onset of a very bad headache.   F-face. Sudden weakness or loss of feeling of the face or facial droop on one side.   A-arms Sudden weakness or numbness in one arm.  One arm drifts down if they are both held out in front of you. This happens suddenly and usually on one side of the body.  S-speech.  Sudden trouble speaking, slurred speech or trouble understanding what people are saying.   T-time  Time to call emergency services.  Write down the symptoms and the time they started.        Make Sure You:  Understand these instructions.  Will watch your condition.  Will get help right away if you are not doing well or get worse.

## 2024-12-13 NOTE — Clinical Note
The DP pulses are +2 bilaterally. The right DP pulse is +2. The PT pulses are +1 bilaterally. The right PT pulse is +1. The femoral pulses are +2 bilaterally. The right femoral pulse is +2.

## 2024-12-13 NOTE — Clinical Note
A 8 fr sheath was successfully inserted using micropuncture technique with ultrasound guidance into the right femoral vein. Sheath insertion not delayed.

## 2024-12-27 ENCOUNTER — TELEPHONE (OUTPATIENT)
Dept: CARDIOLOGY | Facility: CLINIC | Age: 54
End: 2024-12-27

## 2024-12-27 NOTE — TELEPHONE ENCOUNTER
Caller: Linda Castano    Relationship: Self    Best call back number:  [phone]      PATIENT IS RETURNING A CALL FOR SCHEDULING, WAS ADVISED ECHO NEEDED TO BE RESCHEDULED WITH A FOLLOW UP

## 2025-01-14 ENCOUNTER — OFFICE VISIT (OUTPATIENT)
Dept: NEUROLOGY | Facility: CLINIC | Age: 55
End: 2025-01-14
Payer: COMMERCIAL

## 2025-01-14 VITALS
OXYGEN SATURATION: 99 % | BODY MASS INDEX: 35.94 KG/M2 | DIASTOLIC BLOOD PRESSURE: 76 MMHG | HEART RATE: 75 BPM | HEIGHT: 62 IN | SYSTOLIC BLOOD PRESSURE: 120 MMHG | WEIGHT: 195.3 LBS

## 2025-01-14 DIAGNOSIS — Z86.73 HISTORY OF STROKE: Primary | ICD-10-CM

## 2025-01-14 DIAGNOSIS — E78.2 MIXED HYPERLIPIDEMIA: ICD-10-CM

## 2025-01-14 DIAGNOSIS — Z91.89 AT RISK FOR SLEEP APNEA: ICD-10-CM

## 2025-01-14 PROBLEM — Z87.74 S/P PATENT FORAMEN OVALE CLOSURE: Status: ACTIVE | Noted: 2025-01-14

## 2025-01-14 PROCEDURE — 99214 OFFICE O/P EST MOD 30 MIN: CPT | Performed by: NURSE PRACTITIONER

## 2025-01-14 NOTE — LETTER
January 14, 2025     ANABELA Campos  [address]  [address]  [place] [address]    Patient: Linda Castano   YOB: 1970   Date of Visit: 1/14/2025     Dear ANABELA Campos:       Thank you for referring Linda Castano to me for evaluation. Below are the relevant portions of my assessment and plan of care.    If you have questions, please do not hesitate to call me. I look forward to following Linda along with you.         Sincerely,        ANABELA Bentley        CC: No Recipients    Rossana Pryor APRN  01/14/25 1552  Sign when Signing Visit  CC:    HPI:  Linda Castano is a  54 y.o.  right-handed female with history of hypertension (previously taken off medications following weight loss), hyperlipidemia, obesity, and PFO s/p closure who is being seen in follow up today for stroke.  She was last seen by me in July 2024.    She was admitted to [facility] in May 2024 with complaints of sudden onset right hand and right foot weakness.  She was not on any medications prior to arrival and did not smoke or use drugs, drinking alcohol only occasionally.  She had not seen a PCP since 2020.  CTA head/neck showed no significant stenosis, occlusion, or aneurysm.  MRI brain showed acute left frontal parietal subcortical stroke extending into the corona radiata as well as old caudate/corona radiata/internal capsule stroke.  MRI C-spine also checked with and without contrast showed mild cervical spondylosis with some degenerative changes.  LDL was 177, A1c 5.8%.  2D echo showed normal EF, normal LA cavity size, found to have small PFO with bidirectional shunting.  She was started on low-dose aspirin and Xarelto 2.5 mg twice daily per Dr. Hewitt as well as Lipitor 80 mg daily.  Hypercoagulable panel was negative.  Zio patch shows unremarkable, loop recorder since placed which was negative.    When I last saw her I referred her for sleep study  which she states needs to be rescheduled.  She has not had any TIA or stroke symptoms since her last visit with me.  She did undergo PFO closure in December and has been switched to aspirin and Plavix which per Dr. Crooks's notes she will need to continue for 90 days then stop Plavix and continue aspirin indefinitely.  She is no longer taking statin.  She did have side effects on Lipitor 80 mg and 40 mg but tolerated 20 mg however this was discontinued by her PCP after calcium score checked and was 0.    She reports some headaches since her PFO closure, typically on a daily basis though headache is mild, bitemporal, without light/sound sensitivity, nausea, jaw claudication, or vision change.  Headaches resolved spontaneously without taking any medication.      Past Medical History:   Diagnosis Date   • Hyperlipidemia 5/22/24   • Hypertension     improved with weight loss   • Implantable loop recorder present 07/2024   • Obesity    • PFO (patent foramen ovale)    • Stroke          Past Surgical History:   Procedure Laterality Date   • CARDIAC ELECTROPHYSIOLOGY PROCEDURE N/A 07/09/2024    Procedure: Loop insertion  medtronic;  Surgeon: Mahin Crooks MD;  Location: [facility] INVASIVE LOCATION;  Service: Cardiovascular;  Laterality: N/A;   • PATENT FORAMEN OVALE CLOSURE N/A 12/13/2024    Procedure: Patent foramen ovale closure ABBOTT;  Surgeon: Mahin Crooks MD;  Location: [facility] INVASIVE LOCATION;  Service: Cardiology;  Laterality: N/A;           Current Outpatient Medications:   •  Aspirin Low Dose 81 MG EC tablet, Take 1 tablet by mouth Daily., Disp: , Rfl:   •  clopidogrel (PLAVIX) 75 MG tablet, Take 1 tablet by mouth Daily., Disp: 90 tablet, Rfl: 0  •  CVS TRIPLE MAGNESIUM COMPLEX PO, Take 300 mg by mouth Every Night., Disp: , Rfl:   •  multivitamin with minerals tablet tablet, Take 1 tablet by mouth Daily., Disp: , Rfl:       Family History   Problem Relation Age of Onset   • Hypertension  "Mother    • Stroke Father    • Hypertension Father    • Hyperlipidemia Father    • Heart disease Maternal Grandfather          Social History     Socioeconomic History   • Marital status:    Tobacco Use   • Smoking status: Never     Passive exposure: Never   • Smokeless tobacco: Never   Vaping Use   • Vaping status: Never Used   Substance and Sexual Activity   • Alcohol use: Not Currently     Comment: No longer drinking alcohol   • Drug use: Never   • Sexual activity: Not Currently     Birth control/protection: Post-menopausal         Allergies   Allergen Reactions   • Sulfa Antibiotics Hives             Physical Exam:  Vitals:    01/14/25 1505   BP: 120/76   Pulse: 75   SpO2: 99%   Weight: 88.6 kg (195 lb 4.8 oz)   Height: 157.5 cm (62\")     Orthostatic BP:    Body mass index is 35.72 kg/m².    General appearance: Well developed, well nourished, well groomed, alert and cooperative.   HEENT: Normocephalic. No temporal tenderness.   Cardiac: Regular rate and rhythm. No murmurs.   Chest Exam: Clear to auscultation bilaterally, no wheezes, no rhonchi.  Extremities: Normal, no edema.   Skin: No rashes or birthmarks.     Higher integrative function: Oriented to time, place, person, intact recent and remote memory, attention span, concentration and language. Spontaneous speech, fund of vocabulary are normal.   Cranial nerves: Visual fields intact, extraocular movements full, PERRL, normal facial sensation, face symmetric, hearing intact, tongue midline, no dysarthria.  Motor: Normal muscle strength, bulk, and tone in upper and lower extremities. No fasciculations, rigidity, spasticity or abnormal movements.   Sensation: Normal light touch in all extremities  Station and gait: Normal gait and station..   Coordination: Finger to nose test showed no dysmetria.       Results:      Lab Results   Component Value Date    GLUCOSE 98 12/06/2024    BUN 16 12/06/2024    CREATININE 0.84 12/06/2024    EGFRIFAFRI >60 12/15/2020 " "   BCR 19.0 12/06/2024    CO2 25.6 12/06/2024    CALCIUM 9.3 12/06/2024    ALBUMIN 4.0 05/23/2024    LABIL2 1.3 12/15/2020    AST 17 05/23/2024    ALT 25 05/23/2024       Lab Results   Component Value Date    WBC 7.44 12/06/2024    HGB 12.5 12/06/2024    HCT 39.2 12/06/2024    MCV 87.5 12/06/2024     12/06/2024         .No results found for: \"RPR\"      Lab Results   Component Value Date    TSH 4.100 05/22/2024         Lab Results   Component Value Date    [id number] 445 05/22/2024         Lab Results   Component Value Date    FOLATE 7.63 05/22/2024         Lab Results   Component Value Date    HGBA1C 5.80 (H) 05/22/2024         Lab Results   Component Value Date    GLUCOSE 98 12/06/2024    BUN 16 12/06/2024    CREATININE 0.84 12/06/2024    EGFRIFAFRI >60 12/15/2020    BCR 19.0 12/06/2024    K 5.0 12/06/2024    CO2 25.6 12/06/2024    CALCIUM 9.3 12/06/2024    ALBUMIN 4.0 05/23/2024    LABIL2 1.3 12/15/2020    AST 17 05/23/2024    ALT 25 05/23/2024         Lab Results   Component Value Date    WBC 7.44 12/06/2024    HGB 12.5 12/06/2024    HCT 39.2 12/06/2024    MCV 87.5 12/06/2024     12/06/2024             Assessment/Plan:     Linda was seen today for stroke follow-up.  Since I last saw her she has undergone PFO closure and is currently on DAPT for 90 days which will be followed by aspirin monotherapy.  She is reporting brief daily headache.  I have recommended Migrelief but she prefers not to start a new medication/supplement so she will begin taking her magnesium more regularly and can try adding riboflavin if needed.    For stroke prevention  Blood pressure control to <130/80  Goal LDL <70-recommend high dose statins-recommend restarting Lipitor 20 mg daily which she states she has at home   Serum glucose < 140  Call 911 for stroke any stroke symptoms  Recommend regular exercise, healthy diet, RICO evaluation, another referral for sleep study was placed    Follow-up in 1 year or sooner if " needed  Diagnoses and all orders for this visit:    1. History of stroke (Primary)    2. At risk for sleep apnea  -     Ambulatory Referral to Sleep Medicine    3. Mixed hyperlipidemia          Total time: 32 minutes              Dictated utilizing Dragon dictation.

## 2025-01-14 NOTE — PROGRESS NOTES
CC:    HPI:  Linda Castano is a  54 y.o.  right-handed female with history of hypertension (previously taken off medications following weight loss), hyperlipidemia, obesity, and PFO s/p closure who is being seen in follow up today for stroke.  She was last seen by me in July 2024.    She was admitted to [facility] in May 2024 with complaints of sudden onset right hand and right foot weakness.  She was not on any medications prior to arrival and did not smoke or use drugs, drinking alcohol only occasionally.  She had not seen a PCP since 2020.  CTA head/neck showed no significant stenosis, occlusion, or aneurysm.  MRI brain showed acute left frontal parietal subcortical stroke extending into the corona radiata as well as old caudate/corona radiata/internal capsule stroke.  MRI C-spine also checked with and without contrast showed mild cervical spondylosis with some degenerative changes.  LDL was 177, A1c 5.8%.  2D echo showed normal EF, normal LA cavity size, found to have small PFO with bidirectional shunting.  She was started on low-dose aspirin and Xarelto 2.5 mg twice daily per Dr. Hewitt as well as Lipitor 80 mg daily.  Hypercoagulable panel was negative.  Zio patch shows unremarkable, loop recorder since placed which was negative.    When I last saw her I referred her for sleep study which she states needs to be rescheduled.  She has not had any TIA or stroke symptoms since her last visit with me.  She did undergo PFO closure in December and has been switched to aspirin and Plavix which per Dr. Crooks's notes she will need to continue for 90 days then stop Plavix and continue aspirin indefinitely.  She is no longer taking statin.  She did have side effects on Lipitor 80 mg and 40 mg but tolerated 20 mg however this was discontinued by her PCP after calcium score checked and was 0.    She reports some headaches since her PFO closure, typically on a daily basis though headache is mild,  bitemporal, without light/sound sensitivity, nausea, jaw claudication, or vision change.  Headaches resolved spontaneously without taking any medication.      Past Medical History:   Diagnosis Date    Hyperlipidemia 5/22/24    Hypertension     improved with weight loss    Implantable loop recorder present 07/2024    Obesity     PFO (patent foramen ovale)     Stroke          Past Surgical History:   Procedure Laterality Date    CARDIAC ELECTROPHYSIOLOGY PROCEDURE N/A 07/09/2024    Procedure: Loop insertion  medtronic;  Surgeon: Mahin Crooks MD;  Location:  [facility];  Service: Cardiovascular;  Laterality: N/A;    PATENT FORAMEN OVALE CLOSURE N/A 12/13/2024    Procedure: Patent foramen ovale closure ABBOTT;  Surgeon: Mahin Crooks MD;  Location:  [facility];  Service: Cardiology;  Laterality: N/A;           Current Outpatient Medications:     Aspirin Low Dose 81 MG EC tablet, Take 1 tablet by mouth Daily., Disp: , Rfl:     clopidogrel (PLAVIX) 75 MG tablet, Take 1 tablet by mouth Daily., Disp: 90 tablet, Rfl: 0    [employer] TRIPLE MAGNESIUM COMPLEX PO, Take 300 mg by mouth Every Night., Disp: , Rfl:     multivitamin with minerals tablet tablet, Take 1 tablet by mouth Daily., Disp: , Rfl:       Family History   Problem Relation Age of Onset    Hypertension Mother     Stroke Father     Hypertension Father     Hyperlipidemia Father     Heart disease Maternal Grandfather          Social History     Socioeconomic History    Marital status:    Tobacco Use    Smoking status: Never     Passive exposure: Never    Smokeless tobacco: Never   Vaping Use    Vaping status: Never Used   Substance and Sexual Activity    Alcohol use: Not Currently     Comment: No longer drinking alcohol    Drug use: Never    Sexual activity: Not Currently     Birth control/protection: Post-menopausal         Allergies   Allergen Reactions    Sulfa Antibiotics Hives             Physical  "Exam:  Vitals:    01/14/25 1505   BP: 120/76   Pulse: 75   SpO2: 99%   Weight: 88.6 kg (195 lb 4.8 oz)   Height: 157.5 cm (62\")     Orthostatic BP:    Body mass index is 35.72 kg/m².    General appearance: Well developed, well nourished, well groomed, alert and cooperative.   HEENT: Normocephalic. No temporal tenderness.   Cardiac: Regular rate and rhythm. No murmurs.   Chest Exam: Clear to auscultation bilaterally, no wheezes, no rhonchi.  Extremities: Normal, no edema.   Skin: No rashes or birthmarks.     Higher integrative function: Oriented to time, place, person, intact recent and remote memory, attention span, concentration and language. Spontaneous speech, fund of vocabulary are normal.   Cranial nerves: Visual fields intact, extraocular movements full, PERRL, normal facial sensation, face symmetric, hearing intact, tongue midline, no dysarthria.  Motor: Normal muscle strength, bulk, and tone in upper and lower extremities. No fasciculations, rigidity, spasticity or abnormal movements.   Sensation: Normal light touch in all extremities  Station and gait: Normal gait and station..   Coordination: Finger to nose test showed no dysmetria.       Results:      Lab Results   Component Value Date    GLUCOSE 98 12/06/2024    BUN 16 12/06/2024    CREATININE 0.84 12/06/2024    EGFRIFAFRI >60 12/15/2020    BCR 19.0 12/06/2024    CO2 25.6 12/06/2024    CALCIUM 9.3 12/06/2024    ALBUMIN 4.0 05/23/2024    LABIL2 1.3 12/15/2020    AST 17 05/23/2024    ALT 25 05/23/2024       Lab Results   Component Value Date    WBC 7.44 12/06/2024    HGB 12.5 12/06/2024    HCT 39.2 12/06/2024    MCV 87.5 12/06/2024     12/06/2024         .No results found for: \"RPR\"      Lab Results   Component Value Date    TSH 4.100 05/22/2024         Lab Results   Component Value Date    UPQPUATX13 445 05/22/2024         Lab Results   Component Value Date    FOLATE 7.63 05/22/2024         Lab Results   Component Value Date    HGBA1C 5.80 (H) " 05/22/2024         Lab Results   Component Value Date    GLUCOSE 98 12/06/2024    BUN 16 12/06/2024    CREATININE 0.84 12/06/2024    EGFRIFAFRI >60 12/15/2020    BCR 19.0 12/06/2024    K 5.0 12/06/2024    CO2 25.6 12/06/2024    CALCIUM 9.3 12/06/2024    ALBUMIN 4.0 05/23/2024    LABIL2 1.3 12/15/2020    AST 17 05/23/2024    ALT 25 05/23/2024         Lab Results   Component Value Date    WBC 7.44 12/06/2024    HGB 12.5 12/06/2024    HCT 39.2 12/06/2024    MCV 87.5 12/06/2024     12/06/2024             Assessment/Plan:     Linda was seen today for stroke follow-up.  Since I last saw her she has undergone PFO closure and is currently on DAPT for 90 days which will be followed by aspirin monotherapy.  She is reporting brief daily headache.  I have recommended Migrelief but she prefers not to start a new medication/supplement so she will begin taking her magnesium more regularly and can try adding riboflavin if needed.    For stroke prevention  Blood pressure control to <130/80  Goal LDL <70-recommend high dose statins-recommend restarting Lipitor 20 mg daily which she states she has at home   Serum glucose < 140  Call 911 for stroke any stroke symptoms  Recommend regular exercise, healthy diet, RICO evaluation, another referral for sleep study was placed    Follow-up in 1 year or sooner if needed  Diagnoses and all orders for this visit:    1. History of stroke (Primary)    2. At risk for sleep apnea  -     Ambulatory Referral to Sleep Medicine    3. Mixed hyperlipidemia          Total time: 32 minutes              Dictated utilizing Dragon dictation.

## 2025-02-18 ENCOUNTER — TELEPHONE (OUTPATIENT)
Dept: CARDIOLOGY | Facility: CLINIC | Age: 55
End: 2025-02-18
Payer: COMMERCIAL

## 2025-02-19 ENCOUNTER — HOSPITAL ENCOUNTER (OUTPATIENT)
Dept: CARDIOLOGY | Facility: HOSPITAL | Age: 55
Discharge: HOME | End: 2025-02-19
Admitting: INTERNAL MEDICINE
Payer: COMMERCIAL

## 2025-02-19 ENCOUNTER — OFFICE VISIT (OUTPATIENT)
Age: 55
End: 2025-02-19
Payer: COMMERCIAL

## 2025-02-19 VITALS
BODY MASS INDEX: 35.88 KG/M2 | SYSTOLIC BLOOD PRESSURE: 124 MMHG | HEIGHT: 62 IN | WEIGHT: 195 LBS | OXYGEN SATURATION: 98 % | HEART RATE: 77 BPM | DIASTOLIC BLOOD PRESSURE: 80 MMHG

## 2025-02-19 VITALS
HEIGHT: 62 IN | BODY MASS INDEX: 36.62 KG/M2 | SYSTOLIC BLOOD PRESSURE: 124 MMHG | HEART RATE: 65 BPM | DIASTOLIC BLOOD PRESSURE: 80 MMHG | WEIGHT: 199 LBS

## 2025-02-19 DIAGNOSIS — I25.3 PFO WITH ATRIAL SEPTAL ANEURYSM: Primary | ICD-10-CM

## 2025-02-19 DIAGNOSIS — I25.3 PFO WITH ATRIAL SEPTAL ANEURYSM: ICD-10-CM

## 2025-02-19 DIAGNOSIS — Q21.12 PFO WITH ATRIAL SEPTAL ANEURYSM: Primary | ICD-10-CM

## 2025-02-19 DIAGNOSIS — I63.9 ACUTE CVA (CEREBROVASCULAR ACCIDENT): ICD-10-CM

## 2025-02-19 DIAGNOSIS — Q21.12 PFO WITH ATRIAL SEPTAL ANEURYSM: ICD-10-CM

## 2025-02-19 DIAGNOSIS — Z95.818 IMPLANTABLE LOOP RECORDER PRESENT: ICD-10-CM

## 2025-02-19 LAB
BH CV ECHO MEAS - EDV(MOD-SP2): 70 ML
BH CV ECHO MEAS - EDV(MOD-SP4): 49 ML
BH CV ECHO MEAS - EF(MOD-SP2): 65.7 %
BH CV ECHO MEAS - EF(MOD-SP4): 67.3 %
BH CV ECHO MEAS - ESV(MOD-SP2): 24 ML
BH CV ECHO MEAS - ESV(MOD-SP4): 16 ML
BH CV ECHO MEAS - LV DIASTOLIC VOL/BSA (35-75): 25.9 CM2
BH CV ECHO MEAS - LV SYSTOLIC VOL/BSA (12-30): 8.5 CM2
BH CV ECHO MEAS - SV(MOD-SP2): 46 ML
BH CV ECHO MEAS - SV(MOD-SP4): 33 ML
BH CV ECHO MEAS - SVI(MOD-SP2): 24.3 ML/M2
BH CV ECHO MEAS - SVI(MOD-SP4): 17.5 ML/M2
BH CV ECHO MEAS - TR MAX PG: 11.1 MMHG
BH CV ECHO MEAS - TR MAX VEL: 167 CM/SEC
BH CV ECHO SHUNT ASSESSMENT PERFORMED (HIDDEN SCRIPTING): 1
LV EF BIPLANE MOD: 64.4 %

## 2025-02-19 PROCEDURE — 93308 TTE F-UP OR LMTD: CPT

## 2025-02-19 PROCEDURE — 93321 DOPPLER ECHO F-UP/LMTD STD: CPT

## 2025-02-19 PROCEDURE — 93325 DOPPLER ECHO COLOR FLOW MAPG: CPT

## 2025-02-19 RX ORDER — AMOXICILLIN 500 MG/1
2000 CAPSULE ORAL SEE ADMIN INSTRUCTIONS
Qty: 12 CAPSULE | Refills: 0 | Status: SHIPPED | OUTPATIENT
Start: 2025-02-19

## 2025-02-19 NOTE — PROGRESS NOTES
Linad Castano  1970  Date of Office Visit: 02/19/25  Encounter Provider: Mahin Crooks MD  Place of Service: [facility] CARDIOLOGY      CHIEF COMPLAINT:  PFO associated CVA      HISTORY OF PRESENT ILLNESS:  55 yo woman with a previous history of HTN who in May 2024 with right-sided weakness.  She was found to have an acute left frontal parietal stroke with evidence of an old right internal capsule stroke.  She did not require lytics as her symptoms  improved on their own.  Her blood pressure was 175/93 mmHg upon arrival.  Neurology recommended permissive hypertension initially, and her systolic pressure is now in the 130s.  She was in sinus rhythm.  Her total cholesterol 258, HDL 60, .   She underwent evaluation documented below including an MRI of the brain showing the old lacunar infarct but also acute infarction of the superior left frontoparietal CVA.  She had a transesophageal echocardiogram which I reviewed showing a patent foramen ovale with bidirectional shunting.  No evidence of left atrial appendage thrombus.  She has had a Linq recording device in placed in July 2024 with no evidence of atrial fibrillation documented since that time.   Subsequently underwent patent foramen ovale closure which was performed on 12/13/2024 with a 30 mm Amplatzer PFO occluder device.  We had an excellent result.  She had a repeat transthoracic echocardiogram done today with a device that is well-seated with no evidence of residual shunting on bubble study.    Review of Systems   Constitutional: Negative for fever and malaise/fatigue.   HENT:  Negative for nosebleeds and sore throat.    Eyes:  Negative for blurred vision and double vision.   Cardiovascular:  Negative for chest pain, claudication, palpitations and syncope.   Respiratory:  Negative for cough, shortness of breath and snoring.    Endocrine: Negative for cold intolerance, heat intolerance and polydipsia.  "  Skin:  Negative for itching, poor wound healing and rash.   Musculoskeletal:  Negative for joint pain, joint swelling, muscle weakness and myalgias.   Gastrointestinal:  Negative for abdominal pain, melena, nausea and vomiting.   Neurological:  Negative for light-headedness, loss of balance, seizures, vertigo and weakness.   Psychiatric/Behavioral:  Negative for altered mental status and depression.          Past Medical History:   Diagnosis Date    Hyperlipidemia 5/22/24    Hypertension     improved with weight loss    Implantable loop recorder present 07/2024    Obesity     PFO (patent foramen ovale)     Stroke        The following portions of the patient's history were reviewed and updated as appropriate: Social history , Family history, and Surgical history     Current Outpatient Medications on File Prior to Visit   Medication Sig Dispense Refill    Aspirin Low Dose 81 MG EC tablet Take 1 tablet by mouth Daily.      clopidogrel (PLAVIX) 75 MG tablet Take 1 tablet by mouth Daily. 90 tablet 0    [employer]S TRIPLE MAGNESIUM COMPLEX PO Take 300 mg by mouth Every Night.      multivitamin with minerals tablet tablet Take 1 tablet by mouth Daily.       No current facility-administered medications on file prior to visit.       Allergies   Allergen Reactions    Sulfa Antibiotics Hives       Vitals:    02/19/25 0954   BP: 124/80   Pulse: 65   Weight: 90.3 kg (199 lb)   Height: 157.5 cm (62\")     Body mass index is 36.4 kg/m².   Constitutional:       Appearance: Well-developed.   Eyes:      General: No scleral icterus.     Conjunctiva/sclera: Conjunctivae normal.   HENT:      Head: Normocephalic and atraumatic.   Neck:      Thyroid: No thyromegaly.      Vascular: Normal carotid pulses. No carotid bruit, hepatojugular reflux or JVD.      Trachea: No tracheal deviation.   Pulmonary:      Effort: No respiratory distress.      Breath sounds: Normal breath sounds. No decreased breath sounds. No wheezing. No rhonchi. No rales. "   Chest:      Chest wall: Not tender to palpatation.   Cardiovascular:      Normal rate. Regular rhythm.      No gallop.    Pulses:     Carotid: 2+ bilaterally.     Radial: 2+ bilaterally.     Femoral: 2+ bilaterally.     Dorsalis pedis: 2+ bilaterally.     Posterior tibial: 2+ bilaterally.  Edema:     Peripheral edema absent.   Abdominal:      General: Bowel sounds are normal. There is no distension.      Palpations: Abdomen is soft.      Tenderness: There is no abdominal tenderness.   Musculoskeletal:         General: No deformity.      Cervical back: Normal range of motion and neck supple. Skin:     Findings: No erythema or rash.   Neurological:      Mental Status: Alert and oriented to person, place, and time.      Sensory: No sensory deficit.   Psychiatric:         Behavior: Behavior normal.           Lab Results   Component Value Date    WBC 7.44 12/06/2024    HGB 12.5 12/06/2024    HCT 39.2 12/06/2024    MCV 87.5 12/06/2024     12/06/2024       Lab Results   Component Value Date    GLUCOSE 98 12/06/2024    BUN 16 12/06/2024    CREATININE 0.84 12/06/2024     12/06/2024    K 5.0 12/06/2024     12/06/2024    CALCIUM 9.3 12/06/2024    PROTEINTOT 6.5 05/23/2024    ALBUMIN 4.0 05/23/2024    ALT 25 05/23/2024    AST 17 05/23/2024    ALKPHOS 86 05/23/2024    BILITOT <0.2 05/23/2024    GLOB 2.5 05/23/2024    AGRATIO 1.6 05/23/2024    BCR 19.0 12/06/2024    ANIONGAP 9.4 12/06/2024    EGFR 82.7 12/06/2024       Lab Results   Component Value Date    GLUCOSE 98 12/06/2024    CALCIUM 9.3 12/06/2024     12/06/2024    K 5.0 12/06/2024    CO2 25.6 12/06/2024     12/06/2024    BUN 16 12/06/2024    CREATININE 0.84 12/06/2024    EGFR 82.7 12/06/2024    BCR 19.0 12/06/2024    ANIONGAP 9.4 12/06/2024       Lab Results   Component Value Date    CHOL 250 (H) 05/22/2024    TRIG 79 05/22/2024    HDL 60 05/22/2024     (H) 05/22/2024         ECG 12 Lead    Date/Time: 2/19/2025 10:26 AM  Performed  by: Mahin Crooks MD    Authorized by: Mahin Crooks MD  Comparison: compared with previous ECG from 11/18/2024  Similar to previous ECG  Rhythm: sinus rhythm  Rate: normal  QRS axis: normal    Clinical impression: normal ECG             Results for orders placed during the hospital encounter of 02/19/25    Adult Transthoracic Echo Limited W/ Cont if Necessary Per Protocol    Interpretation Summary    Left ventricular systolic function is normal. Calculated left ventricular EF = 64.4%    Normal left atrial size and volume noted. Saline test results are negative for right to left atrial level shunt. ASD or PFO closure device in interatrial septum.      IMPRESSION:  1. There is a 15 x 8 x 8 mm old lacunar infarct extending from the body  of the right caudate nucleus into the mid right corona radiata region  and superior aspect of the genu of the right internal capsule.     2. There is a triangular area of acute infarction measuring 10 x 11 mm  in size extending from the superior left frontal parietal subcortical  white matter into the posterior left corona radiata region. The  remainder of the MRI of the brain is normal.    DISCUSSION/SUMMARY  54-year-old female who presents to me for follow-up after PFO closure.  She presented back in May 2024 with acute right-sided weakness and was found to have an acute left frontal parietal stroke and old right internal capsular stroke.  She was also noted to have significant hyperlipidemia at that time.  She underwent a very thorough evaluation with no evidence of significant intracerebral plaquing or carotid artery disease.  She had no DVT documented.  She had a transesophageal echocardiogram showing a patent foramen ovale with bidirectional shunting and a positive saline test as well.  She had no evidence of atrial fibrillation.  Underwent PFO closure with an excellent result.    1.  PFO associated stroke: Status post closure with 30 mm Amplatzer PFO occluder  device.  - Continue Plavix for another month.  Aspirin lifelong.    2.  Hyperlipidemia: Defer to primary cardiologist.  She quit taking her statin as she stated that it made her have increased tingling of her extremities.    I will see her back in 1 year and if things are fine at that time we will see her back as needed.

## 2025-03-16 PROCEDURE — 93298 REM INTERROG DEV EVAL SCRMS: CPT | Performed by: STUDENT IN AN ORGANIZED HEALTH CARE EDUCATION/TRAINING PROGRAM

## 2025-06-10 ENCOUNTER — HOSPITAL ENCOUNTER (INPATIENT)
Facility: HOSPITAL | Age: 55
LOS: 5 days | Discharge: HOME HEALTH | End: 2025-06-16
Attending: STUDENT IN AN ORGANIZED HEALTH CARE EDUCATION/TRAINING PROGRAM | Admitting: HOSPITALIST
Payer: COMMERCIAL

## 2025-06-10 ENCOUNTER — APPOINTMENT (OUTPATIENT)
Dept: CT IMAGING | Facility: HOSPITAL | Age: 55
End: 2025-06-10
Payer: COMMERCIAL

## 2025-06-10 DIAGNOSIS — D64.9 ANEMIA, UNSPECIFIED TYPE: ICD-10-CM

## 2025-06-10 DIAGNOSIS — M25.552 ACUTE HIP PAIN, LEFT: ICD-10-CM

## 2025-06-10 DIAGNOSIS — M25.452 EFFUSION OF LEFT HIP: ICD-10-CM

## 2025-06-10 DIAGNOSIS — M00.9 SEPTIC HIP: Primary | ICD-10-CM

## 2025-06-10 DIAGNOSIS — R50.9 FEVER, UNSPECIFIED FEVER CAUSE: ICD-10-CM

## 2025-06-10 LAB
ALBUMIN SERPL-MCNC: 4 G/DL (ref 3.5–5.2)
ALBUMIN/GLOB SERPL: 1.3 G/DL
ALP SERPL-CCNC: 133 U/L (ref 39–117)
ALT SERPL W P-5'-P-CCNC: 37 U/L (ref 1–33)
ANION GAP SERPL CALCULATED.3IONS-SCNC: 11 MMOL/L (ref 5–15)
AST SERPL-CCNC: 27 U/L (ref 1–32)
BASOPHILS # BLD AUTO: 0.03 10*3/MM3 (ref 0–0.2)
BASOPHILS NFR BLD AUTO: 0.3 % (ref 0–1.5)
BILIRUB SERPL-MCNC: 0.4 MG/DL (ref 0–1.2)
BUN SERPL-MCNC: 14 MG/DL (ref 6–20)
BUN/CREAT SERPL: 19.7 (ref 7–25)
CALCIUM SPEC-SCNC: 9.1 MG/DL (ref 8.6–10.5)
CHLORIDE SERPL-SCNC: 104 MMOL/L (ref 98–107)
CO2 SERPL-SCNC: 22 MMOL/L (ref 22–29)
CREAT SERPL-MCNC: 0.71 MG/DL (ref 0.57–1)
CRP SERPL-MCNC: 6.89 MG/DL (ref 0–0.5)
DEPRECATED RDW RBC AUTO: 41.3 FL (ref 37–54)
EGFRCR SERPLBLD CKD-EPI 2021: 101.2 ML/MIN/1.73
EOSINOPHIL # BLD AUTO: 0.05 10*3/MM3 (ref 0–0.4)
EOSINOPHIL NFR BLD AUTO: 0.5 % (ref 0.3–6.2)
ERYTHROCYTE [DISTWIDTH] IN BLOOD BY AUTOMATED COUNT: 13.2 % (ref 12.3–15.4)
ERYTHROCYTE [SEDIMENTATION RATE] IN BLOOD: 43 MM/HR (ref 0–30)
GLOBULIN UR ELPH-MCNC: 3.2 GM/DL
GLUCOSE SERPL-MCNC: 107 MG/DL (ref 65–99)
HCT VFR BLD AUTO: 33.7 % (ref 34–46.6)
HGB BLD-MCNC: 11.2 G/DL (ref 12–15.9)
IMM GRANULOCYTES # BLD AUTO: 0.05 10*3/MM3 (ref 0–0.05)
IMM GRANULOCYTES NFR BLD AUTO: 0.5 % (ref 0–0.5)
LIPASE SERPL-CCNC: 20 U/L (ref 13–60)
LYMPHOCYTES # BLD AUTO: 1.42 10*3/MM3 (ref 0.7–3.1)
LYMPHOCYTES NFR BLD AUTO: 13.4 % (ref 19.6–45.3)
MCH RBC QN AUTO: 28.6 PG (ref 26.6–33)
MCHC RBC AUTO-ENTMCNC: 33.2 G/DL (ref 31.5–35.7)
MCV RBC AUTO: 86.2 FL (ref 79–97)
MONOCYTES # BLD AUTO: 0.83 10*3/MM3 (ref 0.1–0.9)
MONOCYTES NFR BLD AUTO: 7.9 % (ref 5–12)
NEUTROPHILS NFR BLD AUTO: 77.4 % (ref 42.7–76)
NEUTROPHILS NFR BLD AUTO: 8.18 10*3/MM3 (ref 1.7–7)
NRBC BLD AUTO-RTO: 0 /100 WBC (ref 0–0.2)
PLATELET # BLD AUTO: 305 10*3/MM3 (ref 140–450)
PMV BLD AUTO: 9.4 FL (ref 6–12)
POTASSIUM SERPL-SCNC: 4.7 MMOL/L (ref 3.5–5.2)
PROT SERPL-MCNC: 7.2 G/DL (ref 6–8.5)
RBC # BLD AUTO: 3.91 10*6/MM3 (ref 3.77–5.28)
SODIUM SERPL-SCNC: 137 MMOL/L (ref 136–145)
WBC NRBC COR # BLD AUTO: 10.56 10*3/MM3 (ref 3.4–10.8)

## 2025-06-10 PROCEDURE — 80053 COMPREHEN METABOLIC PANEL: CPT | Performed by: STUDENT IN AN ORGANIZED HEALTH CARE EDUCATION/TRAINING PROGRAM

## 2025-06-10 PROCEDURE — 87040 BLOOD CULTURE FOR BACTERIA: CPT | Performed by: EMERGENCY MEDICINE

## 2025-06-10 PROCEDURE — 83605 ASSAY OF LACTIC ACID: CPT | Performed by: EMERGENCY MEDICINE

## 2025-06-10 PROCEDURE — 81001 URINALYSIS AUTO W/SCOPE: CPT | Performed by: STUDENT IN AN ORGANIZED HEALTH CARE EDUCATION/TRAINING PROGRAM

## 2025-06-10 PROCEDURE — 25510000001 IOPAMIDOL PER 1 ML: Performed by: EMERGENCY MEDICINE

## 2025-06-10 PROCEDURE — 83690 ASSAY OF LIPASE: CPT | Performed by: STUDENT IN AN ORGANIZED HEALTH CARE EDUCATION/TRAINING PROGRAM

## 2025-06-10 PROCEDURE — 25010000002 ONDANSETRON PER 1 MG: Performed by: STUDENT IN AN ORGANIZED HEALTH CARE EDUCATION/TRAINING PROGRAM

## 2025-06-10 PROCEDURE — 36415 COLL VENOUS BLD VENIPUNCTURE: CPT

## 2025-06-10 PROCEDURE — 99285 EMERGENCY DEPT VISIT HI MDM: CPT

## 2025-06-10 PROCEDURE — 25010000002 KETOROLAC TROMETHAMINE PER 15 MG: Performed by: EMERGENCY MEDICINE

## 2025-06-10 PROCEDURE — 86140 C-REACTIVE PROTEIN: CPT | Performed by: EMERGENCY MEDICINE

## 2025-06-10 PROCEDURE — 85652 RBC SED RATE AUTOMATED: CPT | Performed by: EMERGENCY MEDICINE

## 2025-06-10 PROCEDURE — 25010000002 MORPHINE PER 10 MG: Performed by: STUDENT IN AN ORGANIZED HEALTH CARE EDUCATION/TRAINING PROGRAM

## 2025-06-10 PROCEDURE — 74177 CT ABD & PELVIS W/CONTRAST: CPT

## 2025-06-10 PROCEDURE — 85025 COMPLETE CBC W/AUTO DIFF WBC: CPT | Performed by: STUDENT IN AN ORGANIZED HEALTH CARE EDUCATION/TRAINING PROGRAM

## 2025-06-10 RX ORDER — MORPHINE SULFATE 2 MG/ML
4 INJECTION, SOLUTION INTRAMUSCULAR; INTRAVENOUS ONCE
Status: COMPLETED | OUTPATIENT
Start: 2025-06-10 | End: 2025-06-10

## 2025-06-10 RX ORDER — ONDANSETRON 2 MG/ML
4 INJECTION INTRAMUSCULAR; INTRAVENOUS ONCE
Status: COMPLETED | OUTPATIENT
Start: 2025-06-10 | End: 2025-06-10

## 2025-06-10 RX ORDER — IOPAMIDOL 755 MG/ML
100 INJECTION, SOLUTION INTRAVASCULAR
Status: COMPLETED | OUTPATIENT
Start: 2025-06-10 | End: 2025-06-10

## 2025-06-10 RX ORDER — KETOROLAC TROMETHAMINE 15 MG/ML
15 INJECTION, SOLUTION INTRAMUSCULAR; INTRAVENOUS ONCE
Status: COMPLETED | OUTPATIENT
Start: 2025-06-10 | End: 2025-06-10

## 2025-06-10 RX ADMIN — MORPHINE SULFATE 4 MG: 2 INJECTION, SOLUTION INTRAMUSCULAR; INTRAVENOUS at 22:49

## 2025-06-10 RX ADMIN — ONDANSETRON 4 MG: 2 INJECTION, SOLUTION INTRAMUSCULAR; INTRAVENOUS at 22:48

## 2025-06-10 RX ADMIN — KETOROLAC TROMETHAMINE 15 MG: 15 INJECTION INTRAMUSCULAR at 23:49

## 2025-06-10 RX ADMIN — IOPAMIDOL 100 ML: 755 INJECTION, SOLUTION INTRAVENOUS at 23:25

## 2025-06-11 ENCOUNTER — ANESTHESIA EVENT (OUTPATIENT)
Dept: PERIOP | Facility: HOSPITAL | Age: 55
End: 2025-06-11
Payer: COMMERCIAL

## 2025-06-11 ENCOUNTER — APPOINTMENT (OUTPATIENT)
Dept: MRI IMAGING | Facility: HOSPITAL | Age: 55
End: 2025-06-11
Payer: COMMERCIAL

## 2025-06-11 ENCOUNTER — APPOINTMENT (OUTPATIENT)
Dept: GENERAL RADIOLOGY | Facility: HOSPITAL | Age: 55
End: 2025-06-11
Payer: COMMERCIAL

## 2025-06-11 ENCOUNTER — ANESTHESIA (OUTPATIENT)
Dept: PERIOP | Facility: HOSPITAL | Age: 55
End: 2025-06-11
Payer: COMMERCIAL

## 2025-06-11 PROBLEM — M25.552 ACUTE HIP PAIN, LEFT: Status: ACTIVE | Noted: 2025-06-11

## 2025-06-11 PROBLEM — M00.9 SEPTIC HIP: Status: ACTIVE | Noted: 2025-06-10

## 2025-06-11 LAB
ANION GAP SERPL CALCULATED.3IONS-SCNC: 7 MMOL/L (ref 5–15)
APPEARANCE FLD: ABNORMAL
BACTERIA UR QL AUTO: ABNORMAL /HPF
BILIRUB UR QL STRIP: NEGATIVE
BUN SERPL-MCNC: 12 MG/DL (ref 6–20)
BUN/CREAT SERPL: 16.7 (ref 7–25)
CALCIUM SPEC-SCNC: 8.5 MG/DL (ref 8.6–10.5)
CHLORIDE SERPL-SCNC: 107 MMOL/L (ref 98–107)
CLARITY UR: CLEAR
CO2 SERPL-SCNC: 24 MMOL/L (ref 22–29)
COLOR FLD: ABNORMAL
COLOR UR: YELLOW
CREAT SERPL-MCNC: 0.72 MG/DL (ref 0.57–1)
CRP SERPL-MCNC: 5.78 MG/DL (ref 0–0.5)
CRYSTALS FLD MICRO: NORMAL
D-LACTATE SERPL-SCNC: 0.8 MMOL/L (ref 0.5–2)
DEPRECATED RDW RBC AUTO: 44.6 FL (ref 37–54)
EGFRCR SERPLBLD CKD-EPI 2021: 99.5 ML/MIN/1.73
ERYTHROCYTE [DISTWIDTH] IN BLOOD BY AUTOMATED COUNT: 13.6 % (ref 12.3–15.4)
GLUCOSE SERPL-MCNC: 95 MG/DL (ref 65–99)
GLUCOSE UR STRIP-MCNC: NEGATIVE MG/DL
HCT VFR BLD AUTO: 32 % (ref 34–46.6)
HGB BLD-MCNC: 10.3 G/DL (ref 12–15.9)
HGB UR QL STRIP.AUTO: NEGATIVE
HYALINE CASTS UR QL AUTO: ABNORMAL /LPF
KETONES UR QL STRIP: ABNORMAL
LEUKOCYTE ESTERASE UR QL STRIP.AUTO: ABNORMAL
LYMPHOCYTES NFR FLD MANUAL: 8 %
MCH RBC QN AUTO: 29 PG (ref 26.6–33)
MCHC RBC AUTO-ENTMCNC: 32.2 G/DL (ref 31.5–35.7)
MCV RBC AUTO: 90.1 FL (ref 79–97)
METHOD: ABNORMAL
MONOCYTES NFR FLD: 3 %
NEUTROPHILS NFR FLD MANUAL: 89 %
NITRITE UR QL STRIP: NEGATIVE
NUC CELL # FLD: ABNORMAL /MM3
PH UR STRIP.AUTO: 5.5 [PH] (ref 5–8)
PLATELET # BLD AUTO: 262 10*3/MM3 (ref 140–450)
PMV BLD AUTO: 9.5 FL (ref 6–12)
POTASSIUM SERPL-SCNC: 4.1 MMOL/L (ref 3.5–5.2)
PROT UR QL STRIP: NEGATIVE
RBC # BLD AUTO: 3.55 10*6/MM3 (ref 3.77–5.28)
RBC # FLD AUTO: ABNORMAL /MM3
RBC # UR STRIP: ABNORMAL /HPF
REF LAB TEST METHOD: ABNORMAL
SODIUM SERPL-SCNC: 138 MMOL/L (ref 136–145)
SP GR UR STRIP: 1.02 (ref 1–1.03)
SQUAMOUS #/AREA URNS HPF: ABNORMAL /HPF
UROBILINOGEN UR QL STRIP: ABNORMAL
WBC # UR STRIP: ABNORMAL /HPF
WBC NRBC COR # BLD AUTO: 10.6 10*3/MM3 (ref 3.4–10.8)

## 2025-06-11 PROCEDURE — 76015 MR SFTY MPLT&/FB ASMT STF EA: CPT

## 2025-06-11 PROCEDURE — 87070 CULTURE OTHR SPECIMN AEROBIC: CPT | Performed by: ORTHOPAEDIC SURGERY

## 2025-06-11 PROCEDURE — 85027 COMPLETE CBC AUTOMATED: CPT | Performed by: NURSE PRACTITIONER

## 2025-06-11 PROCEDURE — 25010000002 HYDROMORPHONE 1 MG/ML SOLUTION

## 2025-06-11 PROCEDURE — 25810000003 SODIUM CHLORIDE 0.9 % SOLUTION 250 ML FLEX CONT: Performed by: ORTHOPAEDIC SURGERY

## 2025-06-11 PROCEDURE — 87205 SMEAR GRAM STAIN: CPT | Performed by: ORTHOPAEDIC SURGERY

## 2025-06-11 PROCEDURE — 89051 BODY FLUID CELL COUNT: CPT | Performed by: ORTHOPAEDIC SURGERY

## 2025-06-11 PROCEDURE — 73721 MRI JNT OF LWR EXTRE W/O DYE: CPT

## 2025-06-11 PROCEDURE — 25010000002 LIDOCAINE 1 % SOLUTION: Performed by: HOSPITALIST

## 2025-06-11 PROCEDURE — 99222 1ST HOSP IP/OBS MODERATE 55: CPT | Performed by: STUDENT IN AN ORGANIZED HEALTH CARE EDUCATION/TRAINING PROGRAM

## 2025-06-11 PROCEDURE — 77002 NEEDLE LOCALIZATION BY XRAY: CPT

## 2025-06-11 PROCEDURE — 27030 ARTHROTOMY HIP W/DRAINAGE: CPT | Performed by: SPECIALIST/TECHNOLOGIST, OTHER

## 2025-06-11 PROCEDURE — 0SJB3ZZ INSPECTION OF LEFT HIP JOINT, PERCUTANEOUS APPROACH: ICD-10-PCS | Performed by: STUDENT IN AN ORGANIZED HEALTH CARE EDUCATION/TRAINING PROGRAM

## 2025-06-11 PROCEDURE — 25010000002 VANCOMYCIN 10 G RECONSTITUTED SOLUTION: Performed by: HOSPITALIST

## 2025-06-11 PROCEDURE — 25010000002 FAMOTIDINE 10 MG/ML SOLUTION: Performed by: STUDENT IN AN ORGANIZED HEALTH CARE EDUCATION/TRAINING PROGRAM

## 2025-06-11 PROCEDURE — 87075 CULTR BACTERIA EXCEPT BLOOD: CPT | Performed by: ORTHOPAEDIC SURGERY

## 2025-06-11 PROCEDURE — 87176 TISSUE HOMOGENIZATION CULTR: CPT | Performed by: ORTHOPAEDIC SURGERY

## 2025-06-11 PROCEDURE — 25010000002 PROPOFOL 10 MG/ML EMULSION

## 2025-06-11 PROCEDURE — 36415 COLL VENOUS BLD VENIPUNCTURE: CPT | Performed by: NURSE PRACTITIONER

## 2025-06-11 PROCEDURE — 88304 TISSUE EXAM BY PATHOLOGIST: CPT | Performed by: HOSPITALIST

## 2025-06-11 PROCEDURE — 25010000002 MIDAZOLAM PER 1 MG: Performed by: STUDENT IN AN ORGANIZED HEALTH CARE EDUCATION/TRAINING PROGRAM

## 2025-06-11 PROCEDURE — 25010000002 SUGAMMADEX 200 MG/2ML SOLUTION

## 2025-06-11 PROCEDURE — 25010000002 DEXAMETHASONE SODIUM PHOSPHATE 20 MG/5ML SOLUTION

## 2025-06-11 PROCEDURE — 25810000003 LACTATED RINGERS PER 1000 ML: Performed by: STUDENT IN AN ORGANIZED HEALTH CARE EDUCATION/TRAINING PROGRAM

## 2025-06-11 PROCEDURE — 0JDM0ZZ EXTRACTION OF LEFT UPPER LEG SUBCUTANEOUS TISSUE AND FASCIA, OPEN APPROACH: ICD-10-PCS | Performed by: ORTHOPAEDIC SURGERY

## 2025-06-11 PROCEDURE — 25010000002 LIDOCAINE PF 2% 2 % SOLUTION

## 2025-06-11 PROCEDURE — 25010000002 CEFTRIAXONE PER 250 MG: Performed by: HOSPITALIST

## 2025-06-11 PROCEDURE — 87015 SPECIMEN INFECT AGNT CONCNTJ: CPT | Performed by: ORTHOPAEDIC SURGERY

## 2025-06-11 PROCEDURE — 25010000002 MORPHINE PER 10 MG: Performed by: NURSE PRACTITIONER

## 2025-06-11 PROCEDURE — 27030 ARTHROTOMY HIP W/DRAINAGE: CPT | Performed by: ORTHOPAEDIC SURGERY

## 2025-06-11 PROCEDURE — 25010000002 VANCOMYCIN 750 MG RECONSTITUTED SOLUTION 1 EACH VIAL: Performed by: ORTHOPAEDIC SURGERY

## 2025-06-11 PROCEDURE — 25010000002 ONDANSETRON PER 1 MG

## 2025-06-11 PROCEDURE — 25010000002 KETOROLAC TROMETHAMINE PER 15 MG

## 2025-06-11 PROCEDURE — 89060 EXAM SYNOVIAL FLUID CRYSTALS: CPT | Performed by: ORTHOPAEDIC SURGERY

## 2025-06-11 PROCEDURE — 25810000003 SODIUM CHLORIDE 0.9 % SOLUTION: Performed by: HOSPITALIST

## 2025-06-11 PROCEDURE — 99223 1ST HOSP IP/OBS HIGH 75: CPT | Performed by: ORTHOPAEDIC SURGERY

## 2025-06-11 PROCEDURE — 86140 C-REACTIVE PROTEIN: CPT | Performed by: HOSPITALIST

## 2025-06-11 PROCEDURE — 80048 BASIC METABOLIC PNL TOTAL CA: CPT | Performed by: NURSE PRACTITIONER

## 2025-06-11 DEVICE — KNOTLESS TISSUE CONTROL DEVICE, VIOLET UNIDIRECTIONAL (ANTIBACTERIAL) SYNTHETIC ABSORBABLE DEVICE
Type: IMPLANTABLE DEVICE | Site: HIP | Status: FUNCTIONAL
Brand: STRATAFIX

## 2025-06-11 DEVICE — KNOTLESS TISSUE CONTROL DEVICE, UNDYED UNIDIRECTIONAL (ANTIBACTERIAL) SYNTHETIC ABSORBABLE DEVICE
Type: IMPLANTABLE DEVICE | Site: HIP | Status: FUNCTIONAL
Brand: STRATAFIX

## 2025-06-11 RX ORDER — POLYETHYLENE GLYCOL 3350 17 G/17G
17 POWDER, FOR SOLUTION ORAL 2 TIMES DAILY
Status: DISCONTINUED | OUTPATIENT
Start: 2025-06-11 | End: 2025-06-16 | Stop reason: HOSPADM

## 2025-06-11 RX ORDER — BISACODYL 10 MG
10 SUPPOSITORY, RECTAL RECTAL DAILY PRN
Status: DISCONTINUED | OUTPATIENT
Start: 2025-06-11 | End: 2025-06-16 | Stop reason: HOSPADM

## 2025-06-11 RX ORDER — ACETAMINOPHEN 325 MG/1
650 TABLET ORAL EVERY 4 HOURS PRN
Status: DISCONTINUED | OUTPATIENT
Start: 2025-06-11 | End: 2025-06-16 | Stop reason: HOSPADM

## 2025-06-11 RX ORDER — DOCUSATE SODIUM 100 MG/1
100 CAPSULE, LIQUID FILLED ORAL 2 TIMES DAILY
Status: DISCONTINUED | OUTPATIENT
Start: 2025-06-11 | End: 2025-06-16 | Stop reason: HOSPADM

## 2025-06-11 RX ORDER — KETAMINE HCL IN NACL, ISO-OSM 100MG/10ML
SYRINGE (ML) INJECTION AS NEEDED
Status: DISCONTINUED | OUTPATIENT
Start: 2025-06-11 | End: 2025-06-11 | Stop reason: SURG

## 2025-06-11 RX ORDER — POLYETHYLENE GLYCOL 3350 17 G/17G
17 POWDER, FOR SOLUTION ORAL DAILY PRN
Status: DISCONTINUED | OUTPATIENT
Start: 2025-06-11 | End: 2025-06-16 | Stop reason: HOSPADM

## 2025-06-11 RX ORDER — DROPERIDOL 2.5 MG/ML
0.62 INJECTION, SOLUTION INTRAMUSCULAR; INTRAVENOUS
Status: DISCONTINUED | OUTPATIENT
Start: 2025-06-11 | End: 2025-06-11 | Stop reason: HOSPADM

## 2025-06-11 RX ORDER — EPHEDRINE SULFATE 50 MG/ML
5 INJECTION, SOLUTION INTRAVENOUS ONCE AS NEEDED
Status: DISCONTINUED | OUTPATIENT
Start: 2025-06-11 | End: 2025-06-11 | Stop reason: HOSPADM

## 2025-06-11 RX ORDER — SODIUM CHLORIDE 0.9 % (FLUSH) 0.9 %
3 SYRINGE (ML) INJECTION EVERY 12 HOURS SCHEDULED
Status: DISCONTINUED | OUTPATIENT
Start: 2025-06-11 | End: 2025-06-11 | Stop reason: HOSPADM

## 2025-06-11 RX ORDER — FENTANYL CITRATE 50 UG/ML
50 INJECTION, SOLUTION INTRAMUSCULAR; INTRAVENOUS ONCE AS NEEDED
Status: DISCONTINUED | OUTPATIENT
Start: 2025-06-11 | End: 2025-06-11 | Stop reason: HOSPADM

## 2025-06-11 RX ORDER — CALCIUM CARBONATE 500 MG/1
2 TABLET, CHEWABLE ORAL 2 TIMES DAILY PRN
Status: DISCONTINUED | OUTPATIENT
Start: 2025-06-11 | End: 2025-06-16 | Stop reason: HOSPADM

## 2025-06-11 RX ORDER — FLUMAZENIL 0.1 MG/ML
0.2 INJECTION INTRAVENOUS AS NEEDED
Status: DISCONTINUED | OUTPATIENT
Start: 2025-06-11 | End: 2025-06-11 | Stop reason: HOSPADM

## 2025-06-11 RX ORDER — TRANEXAMIC ACID 100 MG/ML
INJECTION, SOLUTION INTRAVENOUS AS NEEDED
Status: DISCONTINUED | OUTPATIENT
Start: 2025-06-11 | End: 2025-06-11 | Stop reason: SURG

## 2025-06-11 RX ORDER — ONDANSETRON 2 MG/ML
4 INJECTION INTRAMUSCULAR; INTRAVENOUS ONCE AS NEEDED
Status: DISCONTINUED | OUTPATIENT
Start: 2025-06-11 | End: 2025-06-11 | Stop reason: HOSPADM

## 2025-06-11 RX ORDER — ASPIRIN 81 MG/1
81 TABLET ORAL EVERY 12 HOURS SCHEDULED
Status: DISCONTINUED | OUTPATIENT
Start: 2025-06-12 | End: 2025-06-14

## 2025-06-11 RX ORDER — HYDROCODONE BITARTRATE AND ACETAMINOPHEN 5; 325 MG/1; MG/1
1 TABLET ORAL EVERY 6 HOURS PRN
Refills: 0 | Status: DISCONTINUED | OUTPATIENT
Start: 2025-06-11 | End: 2025-06-16 | Stop reason: HOSPADM

## 2025-06-11 RX ORDER — SODIUM CHLORIDE 9 MG/ML
40 INJECTION, SOLUTION INTRAVENOUS AS NEEDED
Status: DISCONTINUED | OUTPATIENT
Start: 2025-06-11 | End: 2025-06-16 | Stop reason: HOSPADM

## 2025-06-11 RX ORDER — KETOROLAC TROMETHAMINE 30 MG/ML
INJECTION, SOLUTION INTRAMUSCULAR; INTRAVENOUS AS NEEDED
Status: DISCONTINUED | OUTPATIENT
Start: 2025-06-11 | End: 2025-06-11 | Stop reason: SURG

## 2025-06-11 RX ORDER — SODIUM CHLORIDE 0.9 % (FLUSH) 0.9 %
10 SYRINGE (ML) INJECTION EVERY 12 HOURS SCHEDULED
Status: DISCONTINUED | OUTPATIENT
Start: 2025-06-11 | End: 2025-06-16 | Stop reason: HOSPADM

## 2025-06-11 RX ORDER — FAMOTIDINE 10 MG/ML
20 INJECTION, SOLUTION INTRAVENOUS ONCE
Status: COMPLETED | OUTPATIENT
Start: 2025-06-11 | End: 2025-06-11

## 2025-06-11 RX ORDER — SODIUM CHLORIDE, SODIUM LACTATE, POTASSIUM CHLORIDE, CALCIUM CHLORIDE 600; 310; 30; 20 MG/100ML; MG/100ML; MG/100ML; MG/100ML
9 INJECTION, SOLUTION INTRAVENOUS CONTINUOUS
Status: ACTIVE | OUTPATIENT
Start: 2025-06-11 | End: 2025-06-12

## 2025-06-11 RX ORDER — ROCURONIUM BROMIDE 10 MG/ML
INJECTION, SOLUTION INTRAVENOUS AS NEEDED
Status: DISCONTINUED | OUTPATIENT
Start: 2025-06-11 | End: 2025-06-11 | Stop reason: SURG

## 2025-06-11 RX ORDER — OXYCODONE AND ACETAMINOPHEN 7.5; 325 MG/1; MG/1
1 TABLET ORAL EVERY 4 HOURS PRN
Refills: 0 | Status: DISCONTINUED | OUTPATIENT
Start: 2025-06-11 | End: 2025-06-11 | Stop reason: HOSPADM

## 2025-06-11 RX ORDER — MELOXICAM 15 MG/1
7.5 TABLET ORAL ONCE
Status: COMPLETED | OUTPATIENT
Start: 2025-06-11 | End: 2025-06-11

## 2025-06-11 RX ORDER — LABETALOL HYDROCHLORIDE 5 MG/ML
5 INJECTION, SOLUTION INTRAVENOUS
Status: DISCONTINUED | OUTPATIENT
Start: 2025-06-11 | End: 2025-06-11 | Stop reason: HOSPADM

## 2025-06-11 RX ORDER — ONDANSETRON 4 MG/1
4 TABLET, ORALLY DISINTEGRATING ORAL EVERY 6 HOURS PRN
Status: DISCONTINUED | OUTPATIENT
Start: 2025-06-11 | End: 2025-06-16 | Stop reason: HOSPADM

## 2025-06-11 RX ORDER — SODIUM CHLORIDE, SODIUM LACTATE, POTASSIUM CHLORIDE, CALCIUM CHLORIDE 600; 310; 30; 20 MG/100ML; MG/100ML; MG/100ML; MG/100ML
75 INJECTION, SOLUTION INTRAVENOUS CONTINUOUS
Status: DISCONTINUED | OUTPATIENT
Start: 2025-06-11 | End: 2025-06-13

## 2025-06-11 RX ORDER — ACETAMINOPHEN 160 MG/5ML
650 SOLUTION ORAL EVERY 4 HOURS PRN
Status: DISCONTINUED | OUTPATIENT
Start: 2025-06-11 | End: 2025-06-16 | Stop reason: HOSPADM

## 2025-06-11 RX ORDER — ATROPINE SULFATE 0.4 MG/ML
0.4 INJECTION, SOLUTION INTRAMUSCULAR; INTRAVENOUS; SUBCUTANEOUS ONCE AS NEEDED
Status: DISCONTINUED | OUTPATIENT
Start: 2025-06-11 | End: 2025-06-11 | Stop reason: HOSPADM

## 2025-06-11 RX ORDER — PREGABALIN 75 MG/1
75 CAPSULE ORAL ONCE
Status: COMPLETED | OUTPATIENT
Start: 2025-06-11 | End: 2025-06-11

## 2025-06-11 RX ORDER — HYDROMORPHONE HYDROCHLORIDE 1 MG/ML
0.5 INJECTION, SOLUTION INTRAMUSCULAR; INTRAVENOUS; SUBCUTANEOUS
Refills: 0 | Status: DISCONTINUED | OUTPATIENT
Start: 2025-06-11 | End: 2025-06-11 | Stop reason: HOSPADM

## 2025-06-11 RX ORDER — MELOXICAM 15 MG/1
15 TABLET ORAL DAILY
Status: DISCONTINUED | OUTPATIENT
Start: 2025-06-12 | End: 2025-06-16 | Stop reason: HOSPADM

## 2025-06-11 RX ORDER — ONDANSETRON 2 MG/ML
INJECTION INTRAMUSCULAR; INTRAVENOUS AS NEEDED
Status: DISCONTINUED | OUTPATIENT
Start: 2025-06-11 | End: 2025-06-11 | Stop reason: SURG

## 2025-06-11 RX ORDER — SODIUM CHLORIDE 0.9 % (FLUSH) 0.9 %
10 SYRINGE (ML) INJECTION AS NEEDED
Status: DISCONTINUED | OUTPATIENT
Start: 2025-06-11 | End: 2025-06-16 | Stop reason: HOSPADM

## 2025-06-11 RX ORDER — BISACODYL 5 MG/1
5 TABLET, DELAYED RELEASE ORAL DAILY PRN
Status: DISCONTINUED | OUTPATIENT
Start: 2025-06-11 | End: 2025-06-16 | Stop reason: HOSPADM

## 2025-06-11 RX ORDER — PROMETHAZINE HYDROCHLORIDE 25 MG/1
25 SUPPOSITORY RECTAL ONCE AS NEEDED
Status: DISCONTINUED | OUTPATIENT
Start: 2025-06-11 | End: 2025-06-11 | Stop reason: HOSPADM

## 2025-06-11 RX ORDER — VANCOMYCIN 2 GRAM/500 ML IN 0.9 % SODIUM CHLORIDE INTRAVENOUS
20 ONCE
Status: COMPLETED | OUTPATIENT
Start: 2025-06-11 | End: 2025-06-11

## 2025-06-11 RX ORDER — HYDRALAZINE HYDROCHLORIDE 20 MG/ML
5 INJECTION INTRAMUSCULAR; INTRAVENOUS
Status: DISCONTINUED | OUTPATIENT
Start: 2025-06-11 | End: 2025-06-11 | Stop reason: HOSPADM

## 2025-06-11 RX ORDER — AMOXICILLIN 250 MG
2 CAPSULE ORAL 2 TIMES DAILY PRN
Status: DISCONTINUED | OUTPATIENT
Start: 2025-06-11 | End: 2025-06-16 | Stop reason: HOSPADM

## 2025-06-11 RX ORDER — ACETAMINOPHEN 650 MG/1
650 SUPPOSITORY RECTAL EVERY 4 HOURS PRN
Status: DISCONTINUED | OUTPATIENT
Start: 2025-06-11 | End: 2025-06-16 | Stop reason: HOSPADM

## 2025-06-11 RX ORDER — LIDOCAINE HYDROCHLORIDE 20 MG/ML
INJECTION, SOLUTION EPIDURAL; INFILTRATION; INTRACAUDAL; PERINEURAL AS NEEDED
Status: DISCONTINUED | OUTPATIENT
Start: 2025-06-11 | End: 2025-06-11 | Stop reason: SURG

## 2025-06-11 RX ORDER — MORPHINE SULFATE 2 MG/ML
2 INJECTION, SOLUTION INTRAMUSCULAR; INTRAVENOUS
Status: DISPENSED | OUTPATIENT
Start: 2025-06-11 | End: 2025-06-16

## 2025-06-11 RX ORDER — DEXAMETHASONE SODIUM PHOSPHATE 4 MG/ML
INJECTION, SOLUTION INTRA-ARTICULAR; INTRALESIONAL; INTRAMUSCULAR; INTRAVENOUS; SOFT TISSUE AS NEEDED
Status: DISCONTINUED | OUTPATIENT
Start: 2025-06-11 | End: 2025-06-11 | Stop reason: SURG

## 2025-06-11 RX ORDER — PROMETHAZINE HYDROCHLORIDE 25 MG/1
25 TABLET ORAL ONCE AS NEEDED
Status: DISCONTINUED | OUTPATIENT
Start: 2025-06-11 | End: 2025-06-11 | Stop reason: HOSPADM

## 2025-06-11 RX ORDER — SODIUM CHLORIDE 0.9 % (FLUSH) 0.9 %
3-10 SYRINGE (ML) INJECTION AS NEEDED
Status: DISCONTINUED | OUTPATIENT
Start: 2025-06-11 | End: 2025-06-11 | Stop reason: HOSPADM

## 2025-06-11 RX ORDER — ACETAMINOPHEN 10 MG/ML
1000 INJECTION, SOLUTION INTRAVENOUS ONCE
Status: CANCELLED | OUTPATIENT
Start: 2025-06-11 | End: 2025-06-11

## 2025-06-11 RX ORDER — TRANEXAMIC ACID 10 MG/ML
1000 INJECTION, SOLUTION INTRAVENOUS ONCE
Status: CANCELLED | OUTPATIENT
Start: 2025-06-11 | End: 2025-06-11

## 2025-06-11 RX ORDER — KETOROLAC TROMETHAMINE 15 MG/ML
15 INJECTION, SOLUTION INTRAMUSCULAR; INTRAVENOUS EVERY 6 HOURS PRN
Status: DISCONTINUED | OUTPATIENT
Start: 2025-06-11 | End: 2025-06-14

## 2025-06-11 RX ORDER — PROPOFOL 10 MG/ML
VIAL (ML) INTRAVENOUS AS NEEDED
Status: DISCONTINUED | OUTPATIENT
Start: 2025-06-11 | End: 2025-06-11 | Stop reason: SURG

## 2025-06-11 RX ORDER — LIDOCAINE HYDROCHLORIDE 10 MG/ML
0.5 INJECTION, SOLUTION INFILTRATION; PERINEURAL ONCE AS NEEDED
Status: DISCONTINUED | OUTPATIENT
Start: 2025-06-11 | End: 2025-06-11 | Stop reason: HOSPADM

## 2025-06-11 RX ORDER — ONDANSETRON 2 MG/ML
4 INJECTION INTRAMUSCULAR; INTRAVENOUS EVERY 6 HOURS PRN
Status: DISCONTINUED | OUTPATIENT
Start: 2025-06-11 | End: 2025-06-16 | Stop reason: HOSPADM

## 2025-06-11 RX ORDER — MAGNESIUM HYDROXIDE 1200 MG/15ML
LIQUID ORAL AS NEEDED
Status: DISCONTINUED | OUTPATIENT
Start: 2025-06-11 | End: 2025-06-11 | Stop reason: HOSPADM

## 2025-06-11 RX ORDER — FENTANYL CITRATE 50 UG/ML
50 INJECTION, SOLUTION INTRAMUSCULAR; INTRAVENOUS
Status: DISCONTINUED | OUTPATIENT
Start: 2025-06-11 | End: 2025-06-11 | Stop reason: HOSPADM

## 2025-06-11 RX ORDER — HYDROCODONE BITARTRATE AND ACETAMINOPHEN 5; 325 MG/1; MG/1
1 TABLET ORAL ONCE AS NEEDED
Refills: 0 | Status: DISCONTINUED | OUTPATIENT
Start: 2025-06-11 | End: 2025-06-11 | Stop reason: HOSPADM

## 2025-06-11 RX ORDER — NALOXONE HCL 0.4 MG/ML
0.2 VIAL (ML) INJECTION AS NEEDED
Status: DISCONTINUED | OUTPATIENT
Start: 2025-06-11 | End: 2025-06-11 | Stop reason: HOSPADM

## 2025-06-11 RX ORDER — DIPHENHYDRAMINE HYDROCHLORIDE 50 MG/ML
12.5 INJECTION, SOLUTION INTRAMUSCULAR; INTRAVENOUS
Status: DISCONTINUED | OUTPATIENT
Start: 2025-06-11 | End: 2025-06-11 | Stop reason: HOSPADM

## 2025-06-11 RX ORDER — ACETAMINOPHEN 325 MG/1
325 TABLET ORAL EVERY 4 HOURS PRN
Status: DISCONTINUED | OUTPATIENT
Start: 2025-06-11 | End: 2025-06-16 | Stop reason: HOSPADM

## 2025-06-11 RX ORDER — IPRATROPIUM BROMIDE AND ALBUTEROL SULFATE 2.5; .5 MG/3ML; MG/3ML
3 SOLUTION RESPIRATORY (INHALATION) ONCE AS NEEDED
Status: DISCONTINUED | OUTPATIENT
Start: 2025-06-11 | End: 2025-06-11 | Stop reason: HOSPADM

## 2025-06-11 RX ORDER — LIDOCAINE HYDROCHLORIDE 10 MG/ML
10 INJECTION, SOLUTION INFILTRATION; PERINEURAL ONCE
Status: COMPLETED | OUTPATIENT
Start: 2025-06-11 | End: 2025-06-11

## 2025-06-11 RX ORDER — MIDAZOLAM HYDROCHLORIDE 1 MG/ML
1 INJECTION, SOLUTION INTRAMUSCULAR; INTRAVENOUS
Status: DISCONTINUED | OUTPATIENT
Start: 2025-06-11 | End: 2025-06-11 | Stop reason: HOSPADM

## 2025-06-11 RX ADMIN — ROCURONIUM BROMIDE 20 MG: 10 INJECTION, SOLUTION INTRAVENOUS at 15:30

## 2025-06-11 RX ADMIN — TRANEXAMIC ACID 1000 MG: 100 INJECTION INTRAVENOUS at 16:12

## 2025-06-11 RX ADMIN — KETOROLAC TROMETHAMINE 30 MG: 30 INJECTION INTRAMUSCULAR; INTRAVENOUS at 16:36

## 2025-06-11 RX ADMIN — DEXAMETHASONE SODIUM PHOSPHATE 4 MG: 4 INJECTION, SOLUTION INTRAMUSCULAR; INTRAVENOUS at 15:19

## 2025-06-11 RX ADMIN — PREGABALIN 75 MG: 75 CAPSULE ORAL at 15:00

## 2025-06-11 RX ADMIN — MELOXICAM 7.5 MG: 15 TABLET ORAL at 15:00

## 2025-06-11 RX ADMIN — VANCOMYCIN HYDROCHLORIDE 750 MG: 750 INJECTION, POWDER, LYOPHILIZED, FOR SOLUTION INTRAVENOUS at 22:08

## 2025-06-11 RX ADMIN — MIDAZOLAM HYDROCHLORIDE 1 MG: 1 INJECTION, SOLUTION INTRAMUSCULAR; INTRAVENOUS at 13:57

## 2025-06-11 RX ADMIN — ONDANSETRON 4 MG: 2 INJECTION, SOLUTION INTRAMUSCULAR; INTRAVENOUS at 15:19

## 2025-06-11 RX ADMIN — MORPHINE SULFATE 2 MG: 2 INJECTION, SOLUTION INTRAMUSCULAR; INTRAVENOUS at 04:46

## 2025-06-11 RX ADMIN — DOCUSATE SODIUM 100 MG: 100 CAPSULE, LIQUID FILLED ORAL at 22:02

## 2025-06-11 RX ADMIN — Medication 10 ML: at 22:08

## 2025-06-11 RX ADMIN — MORPHINE SULFATE 2 MG: 2 INJECTION, SOLUTION INTRAMUSCULAR; INTRAVENOUS at 08:07

## 2025-06-11 RX ADMIN — LIDOCAINE HYDROCHLORIDE 100 MG: 20 INJECTION, SOLUTION EPIDURAL; INFILTRATION; INTRACAUDAL; PERINEURAL at 15:12

## 2025-06-11 RX ADMIN — Medication 10 ML: at 02:53

## 2025-06-11 RX ADMIN — ROCURONIUM BROMIDE 50 MG: 10 INJECTION, SOLUTION INTRAVENOUS at 15:12

## 2025-06-11 RX ADMIN — SUGAMMADEX 200 MG: 100 INJECTION, SOLUTION INTRAVENOUS at 16:47

## 2025-06-11 RX ADMIN — CEFTRIAXONE 2000 MG: 2 INJECTION, POWDER, FOR SOLUTION INTRAMUSCULAR; INTRAVENOUS at 11:17

## 2025-06-11 RX ADMIN — SODIUM CHLORIDE, POTASSIUM CHLORIDE, SODIUM LACTATE AND CALCIUM CHLORIDE 9 ML/HR: 600; 310; 30; 20 INJECTION, SOLUTION INTRAVENOUS at 13:45

## 2025-06-11 RX ADMIN — POLYETHYLENE GLYCOL 3350 17 G: 17 POWDER, FOR SOLUTION ORAL at 22:02

## 2025-06-11 RX ADMIN — VANCOMYCIN HYDROCHLORIDE 2000 MG: 10 INJECTION, POWDER, LYOPHILIZED, FOR SOLUTION INTRAVENOUS at 13:04

## 2025-06-11 RX ADMIN — PROPOFOL 200 MG: 10 INJECTION, EMULSION INTRAVENOUS at 15:12

## 2025-06-11 RX ADMIN — FAMOTIDINE 20 MG: 10 INJECTION INTRAVENOUS at 13:56

## 2025-06-11 RX ADMIN — PROPOFOL 180 MCG/KG/MIN: 10 INJECTION, EMULSION INTRAVENOUS at 15:14

## 2025-06-11 RX ADMIN — HYDROCODONE BITARTRATE AND ACETAMINOPHEN 1 TABLET: 5; 325 TABLET ORAL at 22:07

## 2025-06-11 RX ADMIN — ROCURONIUM BROMIDE 10 MG: 10 INJECTION, SOLUTION INTRAVENOUS at 16:12

## 2025-06-11 RX ADMIN — Medication 40 MG: at 15:12

## 2025-06-11 RX ADMIN — Medication 10 MG: at 16:12

## 2025-06-11 RX ADMIN — LIDOCAINE HYDROCHLORIDE 7 ML: 10 INJECTION, SOLUTION INFILTRATION; PERINEURAL at 08:40

## 2025-06-11 RX ADMIN — HYDROMORPHONE HYDROCHLORIDE 1 MG: 1 INJECTION, SOLUTION INTRAMUSCULAR; INTRAVENOUS; SUBCUTANEOUS at 15:35

## 2025-06-11 NOTE — ED NOTES
"Nursing report ED to floor  Linda Castano  54 y.o.  female    HPI :  HPI  Stated Reason for Visit: pt to ed via pv from home and reports left hip pain and fever since thursday. pt denies known injury to the joint and states she has, \"had problems off and on since her thirties with the left hip.\" pt reports no surgeries to hip at this time. pt reports she is unable to get comfortable sitting down and is having shooting pains down her left    Chief Complaint  Chief Complaint   Patient presents with    Hip Pain    Fever       Admitting doctor:   Theron Jennings MD    Admitting diagnosis:   The primary encounter diagnosis was Acute hip pain, left. Diagnoses of Fever, unspecified fever cause and Effusion of left hip were also pertinent to this visit.    Code status:   Current Code Status       Date Active Code Status Order ID Comments User Context       6/11/2025 0120 CPR (Attempt to Resuscitate) [id number]  Loly Brooks APRN ED        Question Answer    Code Status (Patient has no pulse and is not breathing) CPR (Attempt to Resuscitate)    Medical Interventions (Patient has pulse or is breathing) Full Support                    Allergies:   Sulfa antibiotics    Isolation:   No active isolations    Intake and Output  No intake or output data in the 24 hours ending 06/11/25 0134    Weight:       06/10/25  2005   Weight: 90.7 kg (200 lb)       Most recent vitals:   Vitals:    06/10/25 2355 06/11/25 0056 06/11/25 0101 06/11/25 0103   BP: 137/79  135/66    BP Location:       Patient Position:       Pulse:  72  78   Resp:       Temp:       SpO2:  97%  95%   Weight:       Height:           Active LDAs/IV Access:   Lines, Drains & Airways       Active LDAs       Name Placement date Placement time Site Days    Peripheral IV 06/10/25 2244 20 G Left Antecubital 06/10/25  2244  Antecubital  less than 1                    Labs (abnormal labs have a star):   Labs Reviewed   COMPREHENSIVE METABOLIC PANEL - Abnormal; " Notable for the following components:       Result Value    Glucose 107 (*)     ALT (SGPT) 37 (*)     Alkaline Phosphatase 133 (*)     All other components within normal limits    Narrative:     GFR Categories in Chronic Kidney Disease (CKD)              GFR Category          GFR (mL/min/1.73)    Interpretation  G1                    90 or greater        Normal or high (1)  G2                    60-89                Mild decrease (1)  G3a                   45-59                Mild to moderate decrease  G3b                   30-44                Moderate to severe decrease  G4                    15-29                Severe decrease  G5                    14 or less           Kidney failure    (1)In the absence of evidence of kidney disease, neither GFR category G1 or G2 fulfill the criteria for CKD.    eGFR calculation 2021 CKD-EPI creatinine equation, which does not include race as a factor   URINALYSIS W/ CULTURE IF INDICATED - Abnormal; Notable for the following components:    Ketones, UA 15 mg/dL (1+) (*)     Leuk Esterase, UA Small (1+) (*)     All other components within normal limits    Narrative:     In absence of clinical symptoms, the presence of pyuria, bacteria, and/or nitrites on the urinalysis result does not correlate with infection.   CBC WITH AUTO DIFFERENTIAL - Abnormal; Notable for the following components:    Hemoglobin 11.2 (*)     Hematocrit 33.7 (*)     Neutrophil % 77.4 (*)     Lymphocyte % 13.4 (*)     Neutrophils, Absolute 8.18 (*)     All other components within normal limits   SEDIMENTATION RATE - Abnormal; Notable for the following components:    Sed Rate 43 (*)     All other components within normal limits   C-REACTIVE PROTEIN - Abnormal; Notable for the following components:    C-Reactive Protein 6.89 (*)     All other components within normal limits   URINALYSIS, MICROSCOPIC ONLY - Abnormal; Notable for the following components:    WBC, UA 3-5 (*)     All other components within  normal limits   LIPASE - Normal   LACTIC ACID, PLASMA - Normal   BLOOD CULTURE   BLOOD CULTURE   BASIC METABOLIC PANEL   CBC (NO DIFF)   CBC AND DIFFERENTIAL    Narrative:     The following orders were created for panel order CBC & Differential.  Procedure                               Abnormality         Status                     ---------                               -----------         ------                     CBC Auto Differential[[id number]]        Abnormal            Final result                 Please view results for these tests on the individual orders.       EKG:   No orders to display       Meds given in ED:   Medications   sodium chloride 0.9 % flush 10 mL (has no administration in time range)   sodium chloride 0.9 % flush 10 mL (has no administration in time range)   sodium chloride 0.9 % infusion 40 mL (has no administration in time range)   acetaminophen (TYLENOL) tablet 650 mg (has no administration in time range)     Or   acetaminophen (TYLENOL) 160 MG/5ML oral solution 650 mg (has no administration in time range)     Or   acetaminophen (TYLENOL) suppository 650 mg (has no administration in time range)   sennosides-docusate (PERICOLACE) 8.6-50 MG per tablet 2 tablet (has no administration in time range)     And   polyethylene glycol (MIRALAX) packet 17 g (has no administration in time range)     And   bisacodyl (DULCOLAX) EC tablet 5 mg (has no administration in time range)     And   bisacodyl (DULCOLAX) suppository 10 mg (has no administration in time range)   ondansetron ODT (ZOFRAN-ODT) disintegrating tablet 4 mg (has no administration in time range)     Or   ondansetron (ZOFRAN) injection 4 mg (has no administration in time range)   calcium carbonate (TUMS) chewable tablet 500 mg (200 mg elemental) (has no administration in time range)   HYDROcodone-acetaminophen (NORCO) 5-325 MG per tablet 1 tablet (has no administration in time range)   morphine injection 2 mg (has no administration in time  range)   morphine injection 4 mg (4 mg Intravenous Given 6/10/25 2249)   ondansetron (ZOFRAN) injection 4 mg (4 mg Intravenous Given 6/10/25 2248)   iopamidol (ISOVUE-370) 76 % injection 100 mL (100 mL Intravenous Given 6/10/25 2325)   ketorolac (TORADOL) injection 15 mg (15 mg Intravenous Given 6/10/25 2349)       Imaging results:  CT Abdomen Pelvis With Contrast  Addendum Date: 6/11/2025  Patient does have a left hip effusion. No aggressive osseous abnormalities are seen.  This report was finalized on 6/11/2025 12:58 AM by Dr. Evelyn Carlos M.D on Workstation: [id number]      Result Date: 6/11/2025  1. No acute intra-abdominal or intrapelvic process seen.  2. Indeterminate solid pulmonary nodule measuring 5 mm. In a low-risk patient with a solid nodule <6 mm, recommend no follow-up. In a high-risk patient, CT at 12 months is optional with stronger consideration if there is suspicious nodule morphology and/or upper lobe location.  Radiation dose reduction techniques were utilized, including automated exposure control and exposure modulation based on body size.   This report was finalized on 6/11/2025 12:12 AM by Dr. Evelyn Carlos M.D on Workstation: [id number]        Ambulatory status:   - up ad taylor    Social issues:   Social History     Socioeconomic History    Marital status:    Tobacco Use    Smoking status: Never     Passive exposure: Never    Smokeless tobacco: Never   Vaping Use    Vaping status: Never Used   Substance and Sexual Activity    Alcohol use: Not Currently     Comment: No longer drinking alcohol    Drug use: Never    Sexual activity: Not Currently     Birth control/protection: Post-menopausal       Peripheral Neurovascular  Peripheral Neurovascular (Adult)  Peripheral Neurovascular WDL: WDL    Neuro Cognitive  Neuro Cognitive (Adult)  Cognitive/Neuro/Behavioral WDL: WDL, level of consciousness, orientation  Level of Consciousness: Alert  Orientation: oriented x  4    Learning  Learning Assessment  Learning Readiness and Ability: no barriers identified  Education Provided  Person Taught: patient    Respiratory  Respiratory WDL  Respiratory WDL: WDL    Abdominal Pain  Abdominal Pain CPG Interventions  Coping Interventions: anticipatory guidance provided, care explained to patient/family prior to performing  Safety Interventions  Safety Precautions/Falls Reduction: assistive device/personal items within reach    Pain Assessments  Pain (Adult)  (0-10) Pain Rating: Rest: 4  (0-10) Pain Rating: Activity: 8  Pain Location: hip  Pain Side/Orientation: left  Response to Pain Interventions: interventions effective per patient    NIH Stroke Scale       Giselle Abdullahi RN  06/11/25 01:34 EDT

## 2025-06-11 NOTE — H&P
[facility]   HISTORY AND PHYSICAL    Patient Name: Linda Castano  : 1970  MRN: [id number]  Primary Care Physician:  Sintia Kemp APRN  Date of admission: 6/10/2025    Subjective   Subjective     Chief Complaint: Hip pain    History of Present Illness    54-year-old female with history of prior CVA with PFO for which she had a closure and is now on just aspirin.  She comes the hospital because of worsening hip pain.  She has had hip pain off and on for about 20 years.  About a month ago its become worse.  It and then last Saturday it had gotten so bad that she was having a difficult time walking on it.  She now comes in because of 10 out of pain.  She received IV pain medication just a couple doses and has improved significantly.    She had joint aspiration this a.m. which shows 50,000 white cells.  Also noted her CRP is 6.8        Review of Systems     Personal History     Past Medical History:   Diagnosis Date    Hyperlipidemia 24    Hypertension     improved with weight loss    Implantable loop recorder present 2024    Obesity     PFO (patent foramen ovale)     Stroke        Past Surgical History:   Procedure Laterality Date    CARDIAC ELECTROPHYSIOLOGY PROCEDURE N/A 2024    Procedure: Loop insertion  medtronic;  Surgeon: Mahin Crooks MD;  Location:  [facility];  Service: Cardiovascular;  Laterality: N/A;    PATENT FORAMEN OVALE CLOSURE N/A 2024    Procedure: Patent foramen ovale closure ABBOTT;  Surgeon: Mahin Crooks MD;  Location:  [facility];  Service: Cardiology;  Laterality: N/A;       Family History: family history includes Heart disease in her maternal grandfather; Hyperlipidemia in her father; Hypertension in her father and mother; Stroke in her father. Otherwise pertinent FHx was reviewed and not pertinent to current issue.    Social History:  reports that she has never smoked. She has never been exposed to  tobacco smoke. She has never used smokeless tobacco. She reports that she does not currently use alcohol. She reports that she does not use drugs.    Home Medications:  Magnesium, amoxicillin, aspirin, clopidogrel, and multivitamin with minerals    Allergies:  Allergies   Allergen Reactions    Sulfa Antibiotics Hives       Objective    Objective     Vitals:   Temp:  [98.4 °F (36.9 °C)-99.9 °F (37.7 °C)] 98.4 °F (36.9 °C)  Heart Rate:  [65-97] 65  Resp:  [16-20] 16  BP: (118-161)/(57-87) 118/57    Physical Exam  General awake alert oriented x 3  Heart is regular rate rhythm no murmurs rubs gallop  Lungs are clear to auscultation bilaterally no wheeze rhonchi  Abdomen is soft nontender nondistended  Extremities hands without any deformity no edema   Pain gets worse when she extends her left leg.      Result Review    Result Review:  I have personally reviewed the results from the time of this admission to 6/11/2025 10:34 EDT and agree with these findings:  []  Laboratory list / accordion  [x]  Microbiology  [x]  Radiology  []  EKG/Telemetry   [x]  Cardiology/Vascular   [x]  Pathology  []  Old records  []  Other:  Most notable findings include: [id number] wbc      Assessment & Plan   Assessment / Plan     Brief Patient Summary:  Linda Castano is a 54 y.o. female who left hip infection    Active Hospital Problems:  Active Hospital Problems    Diagnosis     **Acute hip pain, left      Plan:     Concern for left hip infection  Torn ligamentous teres  - Plan for OR today  -Blood cultures drawn and hip aspirate culture obtained    Hx pfo in dec 2024 and was taken off Plavix at that time, takes an aspirin daily  -Notes she has been off her aspirin since Saturday as she has been taking more ibuprofen  - Cardiology evaluation perioperatively    VTE Prophylaxis:  Mechanical VTE prophylaxis orders are present.        CODE STATUS:    Code Status (Patient has no pulse and is not breathing): CPR (Attempt to Resuscitate)  Medical  Interventions (Patient has pulse or is breathing): Full Support    Admission Status:  I believe this patient meets inpatient status.    Nik Galvin MD

## 2025-06-11 NOTE — PLAN OF CARE
Goal Outcome Evaluation:  Plan of Care Reviewed With: patient        Progress: no change  Outcome Evaluation: Patient admitted to unit for left hip pain. Patients vitals stable. Patient reports pain and PRN medications given. patient is alert and oriented. patient has no skin issues. patient is on room air. patient has spouse at bedside. Patient had MRI this shift. Will conmtinue to monitor.            Problem: Adult Inpatient Plan of Care  Goal: Plan of Care Review  Outcome: Progressing  Flowsheets (Taken 6/11/2025 0533)  Progress: no change  Outcome Evaluation: Patient admitted to unit for left hip pain. Patients vitals stable. Patient reports pain and PRN medications given. patient is alert and oriented. patient has no skin issues. patient is on room air. patient has spouse at bedside. Patient had MRI this shift. Will conmtinue to monitor.  Plan of Care Reviewed With: patient     Problem: Comorbidity Management  Goal: Blood Pressure in Desired Range  Outcome: Progressing  Intervention: Maintain Blood Pressure Management  Recent Flowsheet Documentation  Taken 6/11/2025 0446 by Herminia Costa RN  Medication Review/Management: medications reviewed  Taken 6/11/2025 0252 by Herminia Costa RN  Medication Review/Management: medications reviewed     Problem: Pain Acute  Goal: Optimal Pain Control and Function  Outcome: Progressing  Intervention: Develop Pain Management Plan  Recent Flowsheet Documentation  Taken 6/11/2025 0446 by Herminia Costa RN  Pain Management Interventions: pain medication given  Taken 6/11/2025 0252 by Herminia Costa, RN  Pain Management Interventions: medication offered but refused  Intervention: Prevent or Manage Pain  Recent Flowsheet Documentation  Taken 6/11/2025 0446 by Herminia Costa RN  Medication Review/Management: medications reviewed  Taken 6/11/2025 0252 by Herminia Costa RN  Medication Review/Management: medications reviewed     Problem: Fall Injury Risk  Goal: Absence  of Fall and Fall-Related Injury  Outcome: Progressing  Intervention: Identify and Manage Contributors  Recent Flowsheet Documentation  Taken 6/11/2025 0446 by Herminia Costa, RN  Medication Review/Management: medications reviewed  Taken 6/11/2025 0252 by Herminia Costa, RN  Medication Review/Management: medications reviewed  Intervention: Promote Injury-Free Environment  Recent Flowsheet Documentation  Taken 6/11/2025 0446 by Herminia Costa, RN  Safety Promotion/Fall Prevention: safety round/check completed  Taken 6/11/2025 0252 by Herminia Costa, RN  Safety Promotion/Fall Prevention:   activity supervised   assistive device/personal items within reach   clutter free environment maintained   fall prevention program maintained   lighting adjusted   nonskid shoes/slippers when out of bed   safety round/check completed

## 2025-06-11 NOTE — CONSULTS
"Referring Provider: No ref. provider found  Reason for Consultation:     concern for hip infection     Chief Complaint   Patient presents with    Hip Pain    Fever         Subjective   History of present illness: Patient is a 54-year-old female presenting with left hip pain.  ID consulted for \"concern for hip infection\".    On presentation patient is afebrile with no leukocytosis.  CRP elevated at 6.8 and lactate is normal.  Blood cultures have been drawn and underwent hip aspiration with 58,000 nucleated cells 89% neutrophils.  Cultures pending.  MRI performed with nonspecific myositis and left hip joint effusion.  Orthopedics consulted.    Patient reports she is feeling slightly better after aspiration today.  States that the significant pain started on Saturday.  States she has had sharp pains for many years that come and go intermittently.  This was more severe waking her up in the middle of the night on Saturday night.  Denies any fevers or chills.  Tolerating antibiotics.  Denies any recent dental work.  Most recently had PFO closure at the end of last year.    Past Medical History:   Diagnosis Date    Hyperlipidemia 5/22/24    Hypertension     improved with weight loss    Implantable loop recorder present 07/2024    Obesity     PFO (patent foramen ovale)     Stroke        Past Surgical History:   Procedure Laterality Date    CARDIAC ELECTROPHYSIOLOGY PROCEDURE N/A 07/09/2024    Procedure: Loop insertion  medtronic;  Surgeon: Mahin Crooks MD;  Location:  [facility];  Service: Cardiovascular;  Laterality: N/A;    PATENT FORAMEN OVALE CLOSURE N/A 12/13/2024    Procedure: Patent foramen ovale closure ABBOTT;  Surgeon: Mahin Crooks MD;  Location:  [facility];  Service: Cardiology;  Laterality: N/A;       family history includes Heart disease in her maternal grandfather; Hyperlipidemia in her father; Hypertension in her father and mother; Stroke in her " father.     reports that she has never smoked. She has never been exposed to tobacco smoke. She has never used smokeless tobacco. She reports that she does not currently use alcohol. She reports that she does not use drugs.     Allergies   Allergen Reactions    Sulfa Antibiotics Hives       Medication:  Antibiotics:  Anti-Infectives (From admission, onward)      Ordered     Dose/Rate Route Frequency Start Stop    06/11/25 1040  cefTRIAXone (ROCEPHIN) 2,000 mg in sodium chloride 0.9 % 100 mL MBP        Ordering Provider: Nik Galvin MD    2,000 mg  200 mL/hr over 30 Minutes Intravenous Every 24 Hours 06/11/25 1130 06/18/25 1129    06/11/25 1027  cefTRIAXone (ROCEPHIN) 2,000 mg in sodium chloride 0.9 % 100 mL MBP  Status:  Discontinued        Ordering Provider: Nik Galvin MD    2,000 mg  200 mL/hr over 30 Minutes Intravenous Every 24 Hours 06/11/25 1115 06/11/25 1031    06/11/25 1040  Pharmacy to dose vancomycin        Ordering Provider: Nik Galvin MD     Not Applicable Continuous PRN 06/11/25 1040 06/18/25 1039    06/11/25 1027  Pharmacy to dose vancomycin  Status:  Discontinued        Ordering Provider: Nik Galvin MD     Not Applicable Continuous PRN 06/11/25 1027 06/11/25 1031              Objective     Physical Exam:   Vital Signs   Temp:  [98.4 °F (36.9 °C)-99.9 °F (37.7 °C)] 98.4 °F (36.9 °C)  Heart Rate:  [65-97] 65  Resp:  [16-20] 16  BP: (118-161)/(57-87) 118/57    GENERAL: Awake and alert, in no acute distress.   HEENT: Oropharynx is clear. Hearing is grossly normal.   EYES: PERRL. No conjunctival injection. No lid lag.   LUNGS: Normal work of breathing.  SKIN: Warm and dry without cutaneous eruptions in exposed areas.  PSYCHIATRIC: Appropriate mood, affect, insight, and judgment.     Results Review:   I reviewed the patient's new clinical results.  I reviewed the patient's new imaging results and agree with the interpretation.  I reviewed the patient's other test results and  "agree with the interpretation    Lab Results   Component Value Date    WBC 10.60 06/11/2025    HGB 10.3 (L) 06/11/2025    HCT 32.0 (L) 06/11/2025    MCV 90.1 06/11/2025     06/11/2025       No results found for: \"VANCOPEAK\", \"VANCOTROUGH\", \"VANCORANDOM\"    Lab Results   Component Value Date    GLUCOSE 95 06/11/2025    BUN 12.0 06/11/2025    CREATININE 0.72 06/11/2025    EGFRIFAFRI >60 12/15/2020    BCR 16.7 06/11/2025    CO2 24.0 06/11/2025    CALCIUM 8.5 (L) 06/11/2025    ALBUMIN 4.0 06/10/2025    LABIL2 1.3 12/15/2020    AST 27 06/10/2025    ALT 37 (H) 06/10/2025         Estimated Creatinine Clearance: 95.6 mL/min (by C-G formula based on SCr of 0.72 mg/dL).    Isolation:   No active isolations      Microbiology:  Microbiology Results (last 10 days)       ** No results found for the last 240 hours. **           Component      Latest Ref Rng 6/11/2025   Color, Fluid Red    Appearance, Fluid      Clear  Cloudy !    RBC, Fluid      /mm3 40,000    Nucleated Cells, Fluid      /mm3 58,720    Method: Automated Sysmex XN Method    Neutrophils, Fluid      % 89    Lymphocytes, Fluid      % 8    Monocytes, Fluid      % 3       Legend:  ! Abnormal    Radiology:  MRI of the left hip report reviewed with nonspecific large left hip joint effusion and edema.  Mild edema in the surrounding musculature that could be nonspecific myositis.    Assessment     #Concern for left hip native septic arthritis    Aspiration with elevated WBC count concerning for infection.  Blood cultures and joint aspiration culture pending.  Continue empiric coverage with vancomycin goal -600 and ceftriaxone 2 g daily.  Will follow-up culture and de-escalate as indicated by cultures.      Thank you for this consult.  We will continue to follow along and tailor antibiotics as the patient's clinical course evolves.  ------------------------------------------------------------------------------------------------  The above decisions regarding " prescribing and/or discontinuation of antimicrobials incorporate elements of engaging in complex medical decision-making associated with antimicrobial therapy including consideration of patient-specific resistance patterns, consideration of local resistance patterns, the potential to develop resistant strains or new infections while on antimicrobial therapy, patient's recent antibiotic exposure or lack thereof, interactions between antibiotics and other medications, consideration of patient's other co-morbidities in prescribing antibiotic therapy, public health considerations such as risk of transmission of infection or lack thereof, and public health considerations to minimize development of antimicrobial resistance in the community.     The patient was provided education re: his or her specific antimicrobial selection including indications, benefits, and risks.

## 2025-06-11 NOTE — PROGRESS NOTES
"[facility] Clinical Pharmacy Services: Vancomycin Pharmacokinetic Initial Consult Note    Linda Castano is a 54 y.o. female who is on day 1 of pharmacy to dose vancomycin.    Indication: Skin and Soft Tissue  Consulting Provider: Dr. Galvin (Dr. Ellsworth following ID)  Planned Duration of Therapy: 7 days  Loading Dose Ordered or Given: 2000 mg on 6/11 at 1337  MRSA PCR performed: No  Culture/Source:   6/10 Bcx x2: In process  6/11 BFCx (hip aspirate): 1 + WBCs    Target: -600 mg/L.hr   Other Antimicrobials: Ceftriaxone    Vitals/Labs  Ht: 157.5 cm (62\"); Wt: 94.3 kg (207 lb 14.3 oz)  Temp Readings from Last 1 Encounters:   06/11/25 99.1 °F (37.3 °C) (Oral)    Estimated Creatinine Clearance: 95.6 mL/min (by C-G formula based on SCr of 0.72 mg/dL).       Results from last 7 days   Lab Units 06/11/25  0734 06/10/25  2243   CREATININE mg/dL 0.72 0.71   WBC 10*3/mm3 10.60 10.56     Assessment/Plan:  Renal fx is stable. Patient in [facility] for I&D of left hip per Dr. Aly. Aspiration was performed this morning and culture in process. ID is following.   Vancomycin Dose:   750 mg IV every  8  hours  Predictive AUC level for the dose ordered is 459 mg/L.hr, which is within the target of 400-600 mg/L.hr  Vanc Trough has been ordered for 6/12 at 1400     Pharmacy will follow patient's kidney function and will adjust doses and obtain levels as necessary. Thank you for involving pharmacy in this patient's care. Please contact pharmacy with any questions or concerns.                           Cecil Johnson, [facility]  Clinical Pharmacist    "

## 2025-06-11 NOTE — BRIEF OP NOTE
HIP INCISION AND DRAINAGE  Progress Note    Linda Castano  6/11/2025    Pre-op Diagnosis:   Septic hip [M00.9]       Post-Op Diagnosis Codes:     * Septic hip [M00.9]    Procedure(s):      Procedure(s):  HIP INCISION AND DRAINAGE WITH HANA TABLE with possible antibiotic bead placement              Surgeon(s):  Ammon Aly MD    Anesthesia: General    Staff:   Circulator: Aury Carvalho RN  Scrub Person: Brian Cartagena  Orderly: Herb De La Vega  Assistant: Kelly Arroyo CSA  Other: Carmelo Correa  Assistant: Kelly Arroyo CSA    Estimated Blood Loss: 100ml    Urine Voided: * No values recorded between 6/11/2025  3:05 PM and 6/11/2025  4:20 PM *    Specimens:                Specimens       ID Source Type Tests Collected By Collected At Frozen?    1 Hip, Left Surgical Site ANAEROBIC CULTURE  WOUND CULTURE   Ammon Aly MD 6/11/25 1547     Description: left hip culture    2 Hip, Left Surgical Site ANAEROBIC CULTURE  WOUND CULTURE   Ammon Aly MD 6/11/25 1549     Description: left hip culture    3 Hip, Left Tissue ANAEROBIC CULTURE  TISSUE / BONE CULTURE   Ammon Aly MD 6/11/25 1550     Description: left hip tissue culture              Drains:   Closed/Suction Drain 1 Left;Proximal;Anterior Thigh Accordion 10 Fr. (Active)       Findings: Septic joint with some purulent fluid and infectious.  Tissue.      Complications: None    Assistant: Kelly Arroyo CSA  was responsible for performing the following activities: Retraction, Suction, Irrigation, Suturing, Closing, and Placing Dressing and their skilled assistance was necessary for the success of this case.    Ammon Aly MD     Date: 6/11/2025  Time: 16:39 EDT

## 2025-06-11 NOTE — ANESTHESIA PREPROCEDURE EVALUATION
" Anesthesia Evaluation     Patient summary reviewed and Nursing notes reviewed   no history of anesthetic complications:   NPO Solid Status: > 8 hours  NPO Liquid Status: > 2 hours           Airway   Mallampati: II  TM distance: >3 FB  No difficulty expected  Dental - normal exam     Pulmonary - normal exam   (-) COPD, asthma  Cardiovascular   Exercise tolerance: good (4-7 METS)    ECG reviewed  Rhythm: regular    (+) hypertension, valvular problems/murmurs (PFO s/p closure), hyperlipidemia  (-) past MI, angina, cardiac stents    ROS comment: Loop recorder reviewed, no events for last month  Echo 224:   ·  Left ventricular systolic function is normal. Calculated left ventricular EF = 64.4%  ·  Normal left atrial size and volume noted. Saline test results are negative for right to left atrial level shunt. ASD or PFO closure device in interatrial septum.      Neuro/Psych  (+) CVA (May 2024 (transient right hand and foot weakness - resolved))  (-) seizures  GI/Hepatic/Renal/Endo    (+) obesity (BMI >35)  (-) GERD, liver disease, no renal disease    Musculoskeletal     Abdominal    Substance History - negative use     OB/GYN          Other   arthritis, blood dyscrasia anemia,                   Anesthesia Plan    ASA 3     general     (I have reviewed the patient's history and chart with the patient, including all pertinent laboratory results and imaging. I have explained the risks of anesthesia including but not limited to dental or airway injury, nausea, cardio-pulmonary complications, such as aspiration, MI, stroke, or death.     VITALS:  /50 (BP Location: Right arm, Patient Position: Lying)   Pulse 80   Temp 36.2 °C (97.2 °F) (Axillary)   Resp 16   Ht 157.5 cm (62\")   Wt 94.3 kg (207 lb 14.3 oz)   SpO2 98%   BMI 38.02 kg/m² )  intravenous induction     Anesthetic plan, risks, benefits, and alternatives have been provided, discussed and informed consent has been obtained with: patient.  Pre-procedure " education provided    CODE STATUS:    Code Status (Patient has no pulse and is not breathing): CPR (Attempt to Resuscitate)  Medical Interventions (Patient has pulse or is breathing): Full Support

## 2025-06-11 NOTE — ANESTHESIA POSTPROCEDURE EVALUATION
Patient: Linda Castano    Procedure Summary       Date: 06/11/25 Room / Location:  [facility] [address] [place] [address] [facility]    Anesthesia Start: 1505 Anesthesia Stop: 1657    Procedure: HIP INCISION AND DRAINAGE WITH HANA TABLE (Left: Hip) Diagnosis:       Septic hip      (Septic hip [M00.9])    Surgeons: Ammon Aly MD Provider: Neeta Louis MD    Anesthesia Type: general ASA Status: 3            Anesthesia Type: general    Vitals  Vitals Value Taken Time   /67 06/11/25 17:15   Temp 37.3 °C (99.1 °F) 06/11/25 16:55   Pulse 73 06/11/25 17:28   Resp 16 06/11/25 17:15   SpO2 92 % 06/11/25 17:28   Vitals shown include unfiled device data.        Post Anesthesia Care and Evaluation    Patient location during evaluation: PACU  Level of consciousness: responsive to verbal stimuli  Pain management: adequate    Airway patency: patent  Anesthetic complications: No anesthetic complications  PONV Status: none  Cardiovascular status: stable  Respiratory status: acceptable  Hydration status: acceptable

## 2025-06-11 NOTE — PAYOR COMM NOTE
"Linda Castano (54 y.o. Female)    PLEASE SEE ATTACHED FOR INPT AUTH  REF#[id number]    THANK YOU   OSORIO SON RN/ DEPT   [facility]   PH: [phone]   FAX:  DEPT [phone]     Date of Birth   1970    Social Security Number       Address   [address] [place] [address]    Home Phone   [phone]    MRN   [id number]       Latter day   Denominational    Marital Status                               Admission Date   6/10/2025    Admission Type   Emergency    Admitting Provider   Nik Galvin MD    Attending Provider   Nik Galvin MD    Department, Room/Bed   [facility] OSC OR, OSC OR/OSC OR       Discharge Date       Discharge Disposition       Discharge Destination                                 Attending Provider: Nik Galvin MD    Allergies: Sulfa Antibiotics    Isolation: None   Infection: None   Code Status: CPR    Ht: 157.5 cm (62\")   Wt: 94.3 kg (207 lb 14.3 oz)    Admission Cmt: None   Principal Problem: Acute hip pain, left [M25.552]                   Active Insurance as of 6/10/2025       Primary Coverage       Payor Plan Insurance Group Employer/Plan Group    CIGNA CIGNA [id number]       Payor Plan Address Payor Plan Phone Number Payor Plan Fax Number Effective Dates    [address] [phone]  2025 - None Entered    [facility] [id number]         Subscriber Name Subscriber Birth Date Member ID       Nik Castano 1970 [id number]                     Emergency Contacts        (Rel.) Home Phone Work Phone Mobile Phone    Derek Castano (Spouse) [phone] -- --              [place]: [facility] [id number]  Tax ID [id number]     History & Physical        Nik Galvin MD at [facility]          [facility]   HISTORY AND PHYSICAL    Patient Name: Linda Castaon  : 1970  MRN: [id number]  Primary Care Physician:  Sintia Kemp APRN  Date of admission: " 6/10/2025    Subjective  Subjective     Chief Complaint: Hip pain    History of Present Illness    54-year-old female with history of prior CVA with PFO for which she had a closure and is now on just aspirin.  She comes the hospital because of worsening hip pain.  She has had hip pain off and on for about 20 years.  About a month ago its become worse.  It and then last Saturday it had gotten so bad that she was having a difficult time walking on it.  She now comes in because of 10 out of pain.  She received IV pain medication just a couple doses and has improved significantly.    She had joint aspiration this a.m. which shows 50,000 white cells.  Also noted her CRP is 6.8        Review of Systems     Personal History     Past Medical History:   Diagnosis Date    Hyperlipidemia 5/22/24    Hypertension     improved with weight loss    Implantable loop recorder present 07/2024    Obesity     PFO (patent foramen ovale)     Stroke        Past Surgical History:   Procedure Laterality Date    CARDIAC ELECTROPHYSIOLOGY PROCEDURE N/A 07/09/2024    Procedure: Loop insertion  medtronic;  Surgeon: Mahin Crooks MD;  [facility]: [facility] CATH INVASIVE LOCATION;  Service: Cardiovascular;  Laterality: N/A;    PATENT FORAMEN OVALE CLOSURE N/A 12/13/2024    Procedure: Patent foramen ovale closure ABBOTT;  Surgeon: Mahin Crooks MD;  Location:  [facility] CATH INVASIVE LOCATION;  Service: Cardiology;  Laterality: N/A;       Family History: family history includes Heart disease in her maternal grandfather; Hyperlipidemia in her father; Hypertension in her father and mother; Stroke in her father. Otherwise pertinent FHx was reviewed and not pertinent to current issue.    Social History:  reports that she has never smoked. She has never been exposed to tobacco smoke. She has never used smokeless tobacco. She reports that she does not currently use alcohol. She reports that she does not use drugs.    Home  Medications:  Magnesium, amoxicillin, aspirin, clopidogrel, and multivitamin with minerals    Allergies:  Allergies   Allergen Reactions    Sulfa Antibiotics Hives       Objective   Objective     Vitals:   Temp:  [98.4 °F (36.9 °C)-99.9 °F (37.7 °C)] 98.4 °F (36.9 °C)  Heart Rate:  [65-97] 65  Resp:  [16-20] 16  BP: (118-161)/(57-87) 118/57    Physical Exam  General awake alert oriented x 3  Heart is regular rate rhythm no murmurs rubs gallop  Lungs are clear to auscultation bilaterally no wheeze rhonchi  Abdomen is soft nontender nondistended  Extremities hands without any deformity no edema   Pain gets worse when she extends her left leg.      Result Review   Result Review:  I have personally reviewed the results from the time of this admission to 6/11/2025 10:34 EDT and agree with these findings:  []  Laboratory list / accordion  [x]  Microbiology  [x]  Radiology  []  EKG/Telemetry   [x]  Cardiology/Vascular   [x]  Pathology  []  Old records  []  Other:  Most notable findings include: [id number] wbc      Assessment & Plan  Assessment / Plan     Brief Patient Summary:  Linda Castano is a 54 y.o. female who left hip infection    Active Hospital Problems:  Active Hospital Problems    Diagnosis     **Acute hip pain, left      Plan:     Concern for left hip infection  Torn ligamentous teres  - Plan for OR today  -Blood cultures drawn and hip aspirate culture obtained    Hx pfo in dec 2024 and was taken off Plavix at that time, takes an aspirin daily  -Notes she has been off her aspirin since Saturday as she has been taking more ibuprofen      VTE Prophylaxis:  Mechanical VTE prophylaxis orders are present.        CODE STATUS:    Code Status (Patient has no pulse and is not breathing): CPR (Attempt to Resuscitate)  Medical Interventions (Patient has pulse or is breathing): Full Support    Admission Status:  I believe this patient meets inpatient status.    Nik Galvin MD    Electronically signed by Nik Galvin  "MD ISABELLA at 06/11/25 1222          Emergency Department Notes        Giselle Abdullahi, RN at 06/11/25 0134          Nursing report ED to floor  Linda Castano  54 y.o.  female    HPI :  HPI  Stated Reason for Visit: pt to ed via pv from home and reports left hip pain and fever since thursday. pt denies known injury to the joint and states she has, \"had problems off and on since her thirties with the left hip.\" pt reports no surgeries to hip at this time. pt reports she is unable to get comfortable sitting down and is having shooting pains down her left    Chief Complaint  Chief Complaint   Patient presents with    Hip Pain    Fever       Admitting doctor:   Theron Jennings MD    Admitting diagnosis:   The primary encounter diagnosis was Acute hip pain, left. Diagnoses of Fever, unspecified fever cause and Effusion of left hip were also pertinent to this visit.    Code status:   Current Code Status       Date Active Code Status Order ID Comments User Context       6/11/2025 0120 CPR (Attempt to Resuscitate) [id number]  Loly Brooks APRN ED        Question Answer    Code Status (Patient has no pulse and is not breathing) CPR (Attempt to Resuscitate)    Medical Interventions (Patient has pulse or is breathing) Full Support                    Allergies:   Sulfa antibiotics    Isolation:   No active isolations    Intake and Output  No intake or output data in the 24 hours ending 06/11/25 0134    Weight:       06/10/25  2005   Weight: 90.7 kg (200 lb)       Most recent vitals:   Vitals:    06/10/25 2355 06/11/25 0056 06/11/25 0101 06/11/25 0103   BP: 137/79  135/66    BP Location:       Patient Position:       Pulse:  72  78   Resp:       Temp:       SpO2:  97%  95%   Weight:       Height:           Active LDAs/IV Access:   Lines, Drains & Airways       Active LDAs       Name Placement date Placement time Site Days    Peripheral IV 06/10/25 2244 20 G Left Antecubital 06/10/25  2244  Antecubital  less than 1 "                    Labs (abnormal labs have a star):   Labs Reviewed   COMPREHENSIVE METABOLIC PANEL - Abnormal; Notable for the following components:       Result Value    Glucose 107 (*)     ALT (SGPT) 37 (*)     Alkaline Phosphatase 133 (*)     All other components within normal limits    Narrative:     GFR Categories in Chronic Kidney Disease (CKD)              GFR Category          GFR (mL/min/1.73)    Interpretation  G1                    90 or greater        Normal or high (1)  G2                    60-89                Mild decrease (1)  G3a                   45-59                Mild to moderate decrease  G3b                   30-44                Moderate to severe decrease  G4                    15-29                Severe decrease  G5                    14 or less           Kidney failure    (1)In the absence of evidence of kidney disease, neither GFR category G1 or G2 fulfill the criteria for CKD.    eGFR calculation 2021 CKD-EPI creatinine equation, which does not include race as a factor   URINALYSIS W/ CULTURE IF INDICATED - Abnormal; Notable for the following components:    Ketones, UA 15 mg/dL (1+) (*)     Leuk Esterase, UA Small (1+) (*)     All other components within normal limits    Narrative:     In absence of clinical symptoms, the presence of pyuria, bacteria, and/or nitrites on the urinalysis result does not correlate with infection.   CBC WITH AUTO DIFFERENTIAL - Abnormal; Notable for the following components:    Hemoglobin 11.2 (*)     Hematocrit 33.7 (*)     Neutrophil % 77.4 (*)     Lymphocyte % 13.4 (*)     Neutrophils, Absolute 8.18 (*)     All other components within normal limits   SEDIMENTATION RATE - Abnormal; Notable for the following components:    Sed Rate 43 (*)     All other components within normal limits   C-REACTIVE PROTEIN - Abnormal; Notable for the following components:    C-Reactive Protein 6.89 (*)     All other components within normal limits   URINALYSIS,  MICROSCOPIC ONLY - Abnormal; Notable for the following components:    WBC, UA 3-5 (*)     All other components within normal limits   LIPASE - Normal   LACTIC ACID, PLASMA - Normal   BLOOD CULTURE   BLOOD CULTURE   BASIC METABOLIC PANEL   CBC (NO DIFF)   CBC AND DIFFERENTIAL    Narrative:     The following orders were created for panel order CBC & Differential.  Procedure                               Abnormality         Status                     ---------                               -----------         ------                     CBC Auto Differential[[id number]]        Abnormal            Final result                 Please view results for these tests on the individual orders.       EKG:   No orders to display       Meds given in ED:   Medications   sodium chloride 0.9 % flush 10 mL (has no administration in time range)   sodium chloride 0.9 % flush 10 mL (has no administration in time range)   sodium chloride 0.9 % infusion 40 mL (has no administration in time range)   acetaminophen (TYLENOL) tablet 650 mg (has no administration in time range)     Or   acetaminophen (TYLENOL) 160 MG/5ML oral solution 650 mg (has no administration in time range)     Or   acetaminophen (TYLENOL) suppository 650 mg (has no administration in time range)   sennosides-docusate (PERICOLACE) 8.6-50 MG per tablet 2 tablet (has no administration in time range)     And   polyethylene glycol (MIRALAX) packet 17 g (has no administration in time range)     And   bisacodyl (DULCOLAX) EC tablet 5 mg (has no administration in time range)     And   bisacodyl (DULCOLAX) suppository 10 mg (has no administration in time range)   ondansetron ODT (ZOFRAN-ODT) disintegrating tablet 4 mg (has no administration in time range)     Or   ondansetron (ZOFRAN) injection 4 mg (has no administration in time range)   calcium carbonate (TUMS) chewable tablet 500 mg (200 mg elemental) (has no administration in time range)   HYDROcodone-acetaminophen (NORCO) 5-325  MG per tablet 1 tablet (has no administration in time range)   morphine injection 2 mg (has no administration in time range)   morphine injection 4 mg (4 mg Intravenous Given 6/10/25 2249)   ondansetron (ZOFRAN) injection 4 mg (4 mg Intravenous Given 6/10/25 2248)   iopamidol (ISOVUE-370) 76 % injection 100 mL (100 mL Intravenous Given 6/10/25 0615)   ketorolac (TORADOL) injection 15 mg (15 mg Intravenous Given 6/10/25 2349)       Imaging results:  CT Abdomen Pelvis With Contrast  Addendum Date: 6/11/2025  Patient does have a left hip effusion. No aggressive osseous abnormalities are seen.  This report was finalized on 6/11/2025 12:58 AM by Dr. Evelyn Carlos M.D on Workstation: [id number]      Result Date: 6/11/2025  1. No acute intra-abdominal or intrapelvic process seen.  2. Indeterminate solid pulmonary nodule measuring 5 mm. In a low-risk patient with a solid nodule <6 mm, recommend no follow-up. In a high-risk patient, CT at 12 months is optional with stronger consideration if there is suspicious nodule morphology and/or upper lobe location.  Radiation dose reduction techniques were utilized, including automated exposure control and exposure modulation based on body size.   This report was finalized on 6/11/2025 12:12 AM by Dr. Evelyn Carlos M.D on Workstation: [id number]        Ambulatory status:   - up ad taylor    Social issues:   Social History     Socioeconomic History    Marital status:    Tobacco Use    Smoking status: Never     Passive exposure: Never    Smokeless tobacco: Never   Vaping Use    Vaping status: Never Used   Substance and Sexual Activity    Alcohol use: Not Currently     Comment: No longer drinking alcohol    Drug use: Never    Sexual activity: Not Currently     Birth control/protection: Post-menopausal       Peripheral Neurovascular  Peripheral Neurovascular (Adult)  Peripheral Neurovascular WDL: WDL    Neuro Cognitive  Neuro Cognitive (Adult)  Cognitive/Neuro/Behavioral  WDL: WDL, level of consciousness, orientation  Level of Consciousness: Alert  Orientation: oriented x 4    Learning  Learning Assessment  Learning Readiness and Ability: no barriers identified  Education Provided  Person Taught: patient    Respiratory  Respiratory WDL  Respiratory WDL: WDL    Abdominal Pain  Abdominal Pain CPG Interventions  Coping Interventions: anticipatory guidance provided, care explained to patient/family prior to performing  Safety Interventions  Safety Precautions/Falls Reduction: assistive device/personal items within reach    Pain Assessments  Pain (Adult)  (0-10) Pain Rating: Rest: 4  (0-10) Pain Rating: Activity: 8  Pain Location: hip  Pain Side/Orientation: left  Response to Pain Interventions: interventions effective per patient    NIH Stroke Scale       Giselle Abdullahi RN  06/11/25 01:34 EDT     Electronically signed by Giselle Abdullahi RN at 06/11/25 0134       Antolin Blunt MD at 06/11/25 0031           EMERGENCY DEPARTMENT ENCOUNTER  Room Number:  16/16  PCP: Sintia Kemp APRN  Independent Historians: Patient       HPI:  Chief Complaint: had concerns including Hip Pain and Fever.     A complete HPI/ROS/PMH/PSH/SH/FH are unobtainable due to: Nothing      Context: Linda Castano is a 54 y.o. female with a medical history of PFO status post closure who presents to the ED c/o acute left hip pain and fever.  She states the pain feels like it is in her groin but it does radiate down the left thigh.  She had fever up to 101 at home.  The pain is partially alleviated by having her hip in partial flexion.  She denies acute back pain.  She denies previous history of osteoarthritis of the hip.  No recent hip injection or other procedure.      Review of prior external notes (non-ED) -and- Review of prior external test results outside of this encounter: See ED course below        PAST MEDICAL HISTORY  Active Ambulatory Problems     Diagnosis Date Noted    Acute CVA  (cerebrovascular accident) 05/22/2024    Obesity     PFO with atrial septal aneurysm 05/24/2024    Transient elevated blood pressure 06/28/2024    Hyperlipidemia     Implantable loop recorder present 07/2024    S/P patent foramen ovale closure 01/14/2025     Resolved Ambulatory Problems     Diagnosis Date Noted    TIA (transient ischemic attack) 05/22/2024     Past Medical History:   Diagnosis Date    Hypertension     PFO (patent foramen ovale)     Stroke          PAST SURGICAL HISTORY  Past Surgical History:   Procedure Laterality Date    CARDIAC ELECTROPHYSIOLOGY PROCEDURE N/A 07/09/2024    Procedure: Loop insertion  medtronic;  Surgeon: Mahin Crooks MD;  Location:  [facility] CATH INVASIVE LOCATION;  Service: Cardiovascular;  Laterality: N/A;    PATENT FORAMEN OVALE CLOSURE N/A 12/13/2024    Procedure: Patent foramen ovale closure ABBOTT;  Surgeon: Mahin Crooks MD;  Location:  [facility] CATH INVASIVE LOCATION;  Service: Cardiology;  Laterality: N/A;         FAMILY HISTORY  Family History   Problem Relation Age of Onset    Hypertension Mother     Stroke Father     Hypertension Father     Hyperlipidemia Father     Heart disease Maternal Grandfather          SOCIAL HISTORY  Social History     Socioeconomic History    Marital status:    Tobacco Use    Smoking status: Never     Passive exposure: Never    Smokeless tobacco: Never   Vaping Use    Vaping status: Never Used   Substance and Sexual Activity    Alcohol use: Not Currently     Comment: No longer drinking alcohol    Drug use: Never    Sexual activity: Not Currently     Birth control/protection: Post-menopausal         ALLERGIES  Sulfa antibiotics      REVIEW OF SYSTEMS  Review of all 14 systems is negative other than stated in the HPI above.      PHYSICAL EXAM    I have reviewed the triage vital signs and nursing notes.    ED Triage Vitals   Temp Heart Rate Resp BP SpO2   06/10/25 2005 06/10/25 2005 06/10/25 2005 06/10/25 2010 06/10/25 2005    99.9 °F (37.7 °C) 97 20 161/87 100 %      Temp src Heart Rate Source Patient Position BP Location FiO2 (%)   -- -- 06/10/25 2010 06/10/25 2010 --     Sitting Right arm          GENERAL: awake and alert, appears uncomfortable, no acute distress  HENT: Normocephalic, atraumatic  EYES: no scleral icterus  CV: regular rhythm, regular rate  RESPIRATORY: normal effort  ABDOMEN: soft, nondistended, nontender throughout  MUSCULOSKELETAL: no deformity.  There is mild point tenderness over the left greater trochanter and there is pain with passive ranging of the left hip.  There is not significant point tenderness of the left hip anteriorly.  No midline L-spine tenderness.  No erythema or warmth of the left lower extremity.  NEURO: alert, moves all extremities, follows commands  PSYCH: calm, cooperative  SKIN: Warm, dry          LAB RESULTS  Recent Results (from the past 24 hours)   Comprehensive Metabolic Panel    Collection Time: 06/10/25 10:43 PM    Specimen: Blood   Result Value Ref Range    Glucose 107 (H) 65 - 99 mg/dL    BUN 14.0 6.0 - 20.0 mg/dL    Creatinine 0.71 0.57 - 1.00 mg/dL    Sodium 137 136 - 145 mmol/L    Potassium 4.7 3.5 - 5.2 mmol/L    Chloride 104 98 - 107 mmol/L    CO2 22.0 22.0 - 29.0 mmol/L    Calcium 9.1 8.6 - 10.5 mg/dL    Total Protein 7.2 6.0 - 8.5 g/dL    Albumin 4.0 3.5 - 5.2 g/dL    ALT (SGPT) 37 (H) 1 - 33 U/L    AST (SGOT) 27 1 - 32 U/L    Alkaline Phosphatase 133 (H) 39 - 117 U/L    Total Bilirubin 0.4 0.0 - 1.2 mg/dL    Globulin 3.2 gm/dL    A/G Ratio 1.3 g/dL    BUN/Creatinine Ratio 19.7 7.0 - 25.0    Anion Gap 11.0 5.0 - 15.0 mmol/L    eGFR 101.2 >60.0 mL/min/1.73   CBC Auto Differential    Collection Time: 06/10/25 10:43 PM    Specimen: Blood   Result Value Ref Range    WBC 10.56 3.40 - 10.80 10*3/mm3    RBC 3.91 3.77 - 5.28 10*6/mm3    Hemoglobin 11.2 (L) 12.0 - 15.9 g/dL    Hematocrit 33.7 (L) 34.0 - 46.6 %    MCV 86.2 79.0 - 97.0 fL    MCH 28.6 26.6 - 33.0 pg    MCHC 33.2 31.5 -  35.7 g/dL    RDW 13.2 12.3 - 15.4 %    RDW-SD 41.3 37.0 - 54.0 fl    MPV 9.4 6.0 - 12.0 fL    Platelets 305 140 - 450 10*3/mm3    Neutrophil % 77.4 (H) 42.7 - 76.0 %    Lymphocyte % 13.4 (L) 19.6 - 45.3 %    Monocyte % 7.9 5.0 - 12.0 %    Eosinophil % 0.5 0.3 - 6.2 %    Basophil % 0.3 0.0 - 1.5 %    Immature Grans % 0.5 0.0 - 0.5 %    Neutrophils, Absolute 8.18 (H) 1.70 - 7.00 10*3/mm3    Lymphocytes, Absolute 1.42 0.70 - 3.10 10*3/mm3    Monocytes, Absolute 0.83 0.10 - 0.90 10*3/mm3    Eosinophils, Absolute 0.05 0.00 - 0.40 10*3/mm3    Basophils, Absolute 0.03 0.00 - 0.20 10*3/mm3    Immature Grans, Absolute 0.05 0.00 - 0.05 10*3/mm3    nRBC 0.0 0.0 - 0.2 /100 WBC   Lipase    Collection Time: 06/10/25 10:43 PM    Specimen: Blood   Result Value Ref Range    Lipase 20 13 - 60 U/L   Sedimentation Rate    Collection Time: 06/10/25 10:43 PM    Specimen: Blood   Result Value Ref Range    Sed Rate 43 (H) 0 - 30 mm/hr   C-reactive Protein    Collection Time: 06/10/25 10:43 PM    Specimen: Blood   Result Value Ref Range    C-Reactive Protein 6.89 (H) 0.00 - 0.50 mg/dL   Urinalysis With Culture If Indicated - Urine, Clean Catch    Collection Time: 06/10/25 11:50 PM    Specimen: Urine, Clean Catch   Result Value Ref Range    Color, UA Yellow Yellow, Straw    Appearance, UA Clear Clear    pH, UA 5.5 5.0 - 8.0    Specific Gravity, UA 1.019 1.005 - 1.030    Glucose, UA Negative Negative    Ketones, UA 15 mg/dL (1+) (A) Negative    Bilirubin, UA Negative Negative    Blood, UA Negative Negative    Protein, UA Negative Negative    Leuk Esterase, UA Small (1+) (A) Negative    Nitrite, UA Negative Negative    Urobilinogen, UA 0.2 E.U./dL 0.2 - 1.0 E.U./dL   Urinalysis, Microscopic Only - Urine, Clean Catch    Collection Time: 06/10/25 11:50 PM    Specimen: Urine, Clean Catch   Result Value Ref Range    RBC, UA 0-2 None Seen, 0-2 /HPF    WBC, UA 3-5 (A) None Seen, 0-2 /HPF    Bacteria, UA None Seen None Seen /HPF    Squamous  Epithelial Cells, UA 0-2 None Seen, 0-2 /HPF    Hyaline Casts, UA None Seen None Seen /LPF    Methodology Automated Microscopy    Lactic Acid, Plasma    Collection Time: 06/10/25 11:53 PM    Specimen: Blood   Result Value Ref Range    Lactate 0.8 0.5 - 2.0 mmol/L       The above labs were ordered by me and independently reviewed by me.     RADIOLOGY  CT Abdomen Pelvis With Contrast  Addendum Date: 6/11/2025  Patient does have a left hip effusion. No aggressive osseous abnormalities are seen.  This report was finalized on 6/11/2025 12:58 AM by Dr. Evelyn Carlos M.D on Workstation: [id number]      Result Date: 6/11/2025  CT OF THE ABDOMEN PELVIS WITH CONTRAST  HISTORY: Left flank pain and fever  COMPARISON: None available.  TECHNIQUE: Axial CT imaging was obtained through the abdomen and pelvis. IV contrast was administered.  FINDINGS: The patient is status post closure of a patent foramen ovale. Images through the lung bases are degraded by motion artifact. There is a potential nodule within the left lower lobe, measuring 5 mm. No suspicious hepatic lesions are seen. The stomach, adrenal glands, spleen, pancreas, and gallbladder all appear normal there is a duodenal diverticulum.. The kidneys enhance symmetrically. There is no hydronephrosis. There are bilateral parapelvic cysts. Uterus appears to normal. No adnexal masses are seen. No distal ureteral or bladder stones are seen. There is colonic diverticulosis. There is no bowel obstruction. The appendix is normal. No acute osseous abnormalities are seen.      1. No acute intra-abdominal or intrapelvic process seen.  2. Indeterminate solid pulmonary nodule measuring 5 mm. In a low-risk patient with a solid nodule <6 mm, recommend no follow-up. In a high-risk patient, CT at 12 months is optional with stronger consideration if there is suspicious nodule morphology and/or upper lobe location.  Radiation dose reduction techniques were utilized, including automated  exposure control and exposure modulation based on body size.   This report was finalized on 6/11/2025 12:12 AM by Dr. Evelyn Carlos M.D on Workstation: [id number]        The above radiology studies were ordered by me.  See ED course for independent interpretations.     MEDICATIONS GIVEN IN ER  Medications   morphine injection 4 mg (4 mg Intravenous Given 6/10/25 2132)   ondansetron (ZOFRAN) injection 4 mg (4 mg Intravenous Given 6/10/25 4310)   iopamidol (ISOVUE-370) 76 % injection 100 mL (100 mL Intravenous Given 6/10/25 9771)   ketorolac (TORADOL) injection 15 mg (15 mg Intravenous Given 6/10/25 6705)         ORDERS PLACED DURING THIS VISIT:  Orders Placed This Encounter   Procedures    Blood Culture - Blood,    Blood Culture - Blood,    CT Abdomen Pelvis With Contrast    MRI Hip Left Without Contrast    Comprehensive Metabolic Panel    Urinalysis With Culture If Indicated - Urine, Clean Catch    CBC Auto Differential    Lipase    Sedimentation Rate    C-reactive Protein    Lactic Acid, Plasma    Urinalysis, Microscopic Only - Urine, Clean Catch    [facility] (on-call MD unless specified) Details    Ortho (on-call MD unless specified)    Inpatient Admission    CBC & Differential         OUTPATIENT MEDICATION MANAGEMENT:  No current Epic-ordered facility-administered medications on file.     Current Outpatient Medications Ordered in Epic   Medication Sig Dispense Refill    amoxicillin (AMOXIL) 500 MG capsule Take 4 capsules by mouth See Admin Instructions. 4 capsules 1 hour prior to procedure 12 capsule 0    Aspirin Low Dose 81 MG EC tablet Take 1 tablet by mouth Daily.      clopidogrel (PLAVIX) 75 MG tablet Take 1 tablet by mouth Daily. 90 tablet 0    [employer] TRIPLE MAGNESIUM COMPLEX PO Take 300 mg by mouth Every Night.      multivitamin with minerals tablet tablet Take 1 tablet by mouth Daily.           PROCEDURES  Procedures            PROGRESS, DATA ANALYSIS, CONSULTS, AND MEDICAL DECISION MAKING  All labs have been  independently interpreted by me.  All radiology studies have been reviewed by me. All EKG's have been independently viewed and interpreted by me.  Discussion below represents my analysis of pertinent findings related to patient's condition, differential diagnosis, treatment plan and final disposition.    Differential diagnosis includes but is not limited to:  Lumbar radiculopathy  Trochanteric bursitis  Septic arthritis  Tenosynovitis      Clinical Scores:                  ED Course as of 06/11/25 0118   e Kumar 10, 2025   2218 First look: Patient presents to the ED for evaluation of worsening left hip pain for 4 days.  She states it is worse with walking around, goes down the front of her thigh, knee, and the outside of her lower leg.  No trauma.  She also notes some abdominal pain that is worse on the left side.  She states she had a fever of 101 at home prior to arrival today.  She had been taking ibuprofen for pain previously but none today.  Denies any urinary symptoms, nausea, vomiting    On exam patient is uncomfortable appearing, low-grade temperature 99.9.  She does have left lumbar paraspinal muscle tenderness and left-sided abdominal tenderness on exam.  She has moderate discomfort with  internal and external rotation of left hip after passive flexion    Will obtain basic labs, urine, CT abdomen pelvis.  Give IV morphine for pain control.  Remainder of care to be dictated by oncoming provider.  Patient and family are agreeable [DN]   2322 I reviewed cardiology office visit from 2/19/2025.  Patient was seen in follow-up for PFO.  She has a history of left frontal parietal stroke.  She had PFO closure on 12/13/2024. [JR]   2331 CT abdomen pelvis independently interpreted PACS.  There appears to be a left hip effusion. [JR]   Wed Jun 11, 2025   0050 Discussed with Dr. Aly, orthopedic surgery, who agreed with plan to hold antibiotic treatment until the hip can be aspirated in IR tomorrow.  He also  requested an MRI of the left hip without contrast.  He will consult. [JR]   0117 Discussed with ANABELA Mendosa for [facility], who agrees to admit on behalf of Dr. Jennings [JR]      ED Course User Index  [DN] Malik Díaz MD  [JR] Antolin Blunt MD             AS OF 01:18 EDT VITALS:    BP - 135/66  HR - 78  TEMP - 99.9 °F (37.7 °C)  O2 SATS - 95%    COMPLEXITY OF CARE  The patient requires admission.      Chronic or social conditions impacting patient care (Social Determinants of Health):     DIAGNOSIS  Final diagnoses:   Acute hip pain, left   Fever, unspecified fever cause   Effusion of left hip           DISPOSITION  Admit      Prescription drug monitoring program review:           Please note that portions of this document were completed with a voice recognition program.    Note Disclaimer: At [facility], we believe that sharing information builds trust and better relationships. You are receiving this note because you recently visited [facility]. It is possible you will see health information before a provider has talked with you about it. This kind of information can be easy to misunderstand. To help you fully understand what it means for your health, we urge you to discuss this note with your provider.         Antolin Blunt MD  06/11/25 0118      Electronically signed by Antolin Blunt MD at 06/11/25 0118       Medication Administration Report for Linda Castano as of 6/11/25 through 6/11/25     Legend:    Given Hold Not Given Due Canceled Entry Other Actions    Time Time (Time) Time Time-Action         Discontinued     Completed     Future     MAR Hold     Linked             Medications 06/11/25      acetaminophen (TYLENOL) tablet 650 mg  Dose: 650 mg  Freq: Every 4 Hours PRN Route: PO  PRN Reason: Mild Pain  Start: 06/11/25 0119     Admin Instructions:   If given for fever, use fever parameter: fever greater than 100.4 °F  Based on patient request - if ordered for  moderate or severe pain, provider allows for administration of a medication prescribed for a lower pain scale.    Do not exceed 4 grams of acetaminophen in a 24 hr period. Max dose of 2gm for AST/ALT greater than 120 units/L.    If given for pain, use the following pain scale:   Mild Pain = Pain Score of 1-3, CPOT 1-2  Moderate Pain = Pain Score of 4-6, CPOT 3-4  Severe Pain = Pain Score of 7-10, CPOT 5-8      1318-MAR Hold                   Or   acetaminophen (TYLENOL) 160 MG/5ML oral solution 650 mg  Dose: 650 mg  Freq: Every 4 Hours PRN Route: PO  PRN Reason: Mild Pain  Start: 06/11/25 0119     Admin Instructions:   If given for fever, use fever parameter: fever greater than 100.4 °F  Based on patient request - if ordered for moderate or severe pain, provider allows for administration of a medication prescribed for a lower pain scale.    Do not exceed 4 grams of acetaminophen in a 24 hr period. Max dose of 2gm for AST/ALT greater than 120 units/L.    If given for pain, use the following pain scale:   Mild Pain = Pain Score of 1-3, CPOT 1-2  Moderate Pain = Pain Score of 4-6, CPOT 3-4  Severe Pain = Pain Score of 7-10, CPOT 5-8      1318-MAR Hold                   Or   acetaminophen (TYLENOL) suppository 650 mg  Dose: 650 mg  Freq: Every 4 Hours PRN Route: RE  PRN Reason: Mild Pain  Start: 06/11/25 0119     Admin Instructions:   If given for fever, use fever parameter: fever greater than 100.4 °F  Based on patient request - if ordered for moderate or severe pain, provider allows for administration of a medication prescribed for a lower pain scale.    Do not exceed 4 grams of acetaminophen in a 24 hr period. Max dose of 2gm for AST/ALT greater than 120 units/L.    If given for pain, use the following pain scale:   Mild Pain = Pain Score of 1-3, CPOT 1-2  Moderate Pain = Pain Score of 4-6, CPOT 3-4  Severe Pain = Pain Score of 7-10, CPOT 5-8      1318-MAR Hold                    sennosides-docusate (PERICOLACE)  8.6-50 MG per tablet 2 tablet  Dose: 2 tablet  Freq: 2 Times Daily PRN Route: PO  PRN Reason: Constipation  Start: 06/11/25 0119     Admin Instructions:   Start bowel management regimen if patient has not had a bowel movement after 12 hours.      1318-MAR Hold                   And   polyethylene glycol (MIRALAX) packet 17 g  Dose: 17 g  Freq: Daily PRN Route: PO  PRN Reason: Constipation  PRN Comment: Use if senna-docusate is ineffective  Start: 06/11/25 0119     Admin Instructions:   Use if no bowel movement after 12 hours. Mix in 6-8 ounces of water.  Use 4-8 ounces of water, tea, or juice for each 17 gram dose.      1318-MAR Hold                   And   bisacodyl (DULCOLAX) EC tablet 5 mg  Dose: 5 mg  Freq: Daily PRN Route: PO  PRN Reason: Constipation  PRN Comment: Use if polyethylene glycol is ineffective  Start: 06/11/25 0119     Admin Instructions:   Use if no bowel movement after 12 hours.  Swallow whole. Do not crush, split, or chew tablet.      1318-MAR Hold                   And   bisacodyl (DULCOLAX) suppository 10 mg  Dose: 10 mg  Freq: Daily PRN Route: RE  PRN Reason: Constipation  PRN Comment: Use if bisacodyl oral is ineffective  Start: 06/11/25 0119     Admin Instructions:   Use if no bowel movement after 12 hours.  Hold for diarrhea      1318-MAR Hold                   calcium carbonate (TUMS) chewable tablet 500 mg (200 mg elemental)  Dose: 2 tablet  Freq: 2 Times Daily PRN Route: PO  PRN Reason: Heartburn  Start: 06/11/25 0119     Admin Instructions:   One tablet contains 200 mg elemental calcium.  Take with food.      1318-MAR Hold                   ceFAZolin 2000 mg IVPB in 100 mL NS (MBP)  Dose: 2 g  Freq: Once Route: IV  Indications of Use: PERIOPERATIVE PHARMACOPROPHYLAXIS  Start: 06/11/25 1459 End: 06/11/25 1540     Admin Instructions:   Administer within 1 hour of surgical incision. Redose 4 hours from pre-op dose if procedure ongoing or >1.5 L blood loss.  *if pt >120 kg then give 3gm  ancef  * if pt PCn allergic give clindamycin instead of ancef plus vancomycin per weight based pre op protocol if ordered  Caution: Look alike/sound alike drug alert      (7373)-Not Given [C]                   cefTRIAXone (ROCEPHIN) 2,000 mg in sodium chloride 0.9 % 100 mL MBP  Dose: 2,000 mg  Freq: Every 24 Hours Route: IV  Indications of Use: SKIN AND SOFT TISSUE INFECTION  Start: 06/11/25 1130 End: 06/18/25 1129     Admin Instructions:   LR should be paused and flushing of the line with NS is recommended prior to and after completion of ceftriaxone infusion due to incompatibility. Do not co-adminster with calcium-containing solutions.  Caution: Look alike/sound alike drug alert      1117-New Bag     1150-Stopped                  fentaNYL citrate (PF) (SUBLIMAZE) injection 50 mcg  Dose: 50 mcg  Freq: Once As Needed Route: IV  PRN Reason: Severe Pain  Start: 06/11/25 1329     Admin Instructions:   Maximum total dose of fentanyl is 50 mcg without additional orders from physician. May be used for preoperative pain or procedural sedation.  If given for pain, use the following pain scale:  Mild Pain = Pain Score of 1-3, CPOT 1-2  Moderate Pain = Pain Score of 4-6, CPOT 3-4  Severe Pain = Pain Score of 7-10, CPOT 5-8         HYDROcodone-acetaminophen (NORCO) 5-325 MG per tablet 1 tablet  Dose: 1 tablet  Freq: Every 6 Hours PRN Route: PO  PRN Reason: Mild Pain  Start: 06/11/25 0119     Admin Instructions:   Based on patient request - if ordered for moderate or severe pain, provider allows for administration of a medication prescribed for a lower pain scale.  [MITZI]    Do not exceed 4 grams of acetaminophen in a 24 hr period. Max dose of 2gm for AST/ALT greater than 120 units/L        If given for pain, use the following pain scale:   Mild Pain = Pain Score of 1-3, CPOT 1-2  Moderate Pain = Pain Score of 4-6, CPOT 3-4  Severe Pain = Pain Score of 7-10, CPOT 5-8      1318-MAR Hold                   lactated ringers  infusion  Rate: 9 mL/hr Dose: 9 mL/hr  Freq: Continuous Route: IV  Start: 06/11/25 1331 End: 06/12/25 1344      1345-New Bag     1504-Paused [C]     1505-Restarted     1520-Anesthesia Volume Adjustment     1530-Anesthesia Volume Adjustment               lactated ringers infusion  Rate: 75 mL/hr Dose: 75 mL/hr  Freq: Continuous Route: IV  Start: 06/11/25 1145 End: 07/05/25 1216      1217-Currently Infusing                   lidocaine (XYLOCAINE) 1 % injection 0.5 mL  Dose: 0.5 mL  Freq: Once As Needed Route: ID  PRN Comment: IV Start  Start: 06/11/25 1329         midazolam (VERSED) injection 1 mg  Dose: 1 mg  Freq: Every 5 Minutes PRN Route: IV  PRN Comment: Anxiety prophylaxis, Pre-op comfort  Start: 06/11/25 1329     Admin Instructions:   May repeat dose in 5 minutes one time then contact provider for additional orders.  (MITZI)      1357-Given                   morphine injection 2 mg  Dose: 2 mg  Freq: Every 3 Hours PRN Route: IV  PRN Reasons: Moderate Pain,Severe Pain  Start: 06/11/25 0119 End: 06/16/25 0118     Admin Instructions:   If given for pain, use the following pain scale:  Mild Pain = Pain Score of 1-3, CPOT 1-2  Moderate Pain = Pain Score of 4-6, CPOT 3-4  Severe Pain = Pain Score of 7-10, CPOT 5-8      0446-Given     0807-Given     1318-MAR Hold                  ondansetron ODT (ZOFRAN-ODT) disintegrating tablet 4 mg  Dose: 4 mg  Freq: Every 6 Hours PRN Route: PO  PRN Reasons: Nausea,Vomiting  Start: 06/11/25 0119     Admin Instructions:   If BOTH ondansetron (ZOFRAN) and promethazine (PHENERGAN) are ordered use ondansetron first and THEN promethazine IF ondansetron is ineffective.  Place on tongue and allow to dissolve.      1318-MAR Hold                   Or   ondansetron (ZOFRAN) injection 4 mg  Dose: 4 mg  Freq: Every 6 Hours PRN Route: IV  PRN Reasons: Nausea,Vomiting  Start: 06/11/25 0119     Admin Instructions:   If BOTH ondansetron (ZOFRAN) and promethazine (PHENERGAN) are ordered use  ondansetron first and THEN promethazine IF ondansetron is ineffective.      1318-MAR Hold                   Pharmacy to dose vancomycin  Freq: Continuous PRN Route: XX  PRN Reason: Consult  Indications of Use: SKIN AND SOFT TISSUE INFECTION  Start: 06/11/25 1040 End: 06/18/25 1039         sodium chloride (NS) irrigation solution  Freq: As Needed  Start: 06/11/25 1537      1537-Given     1538-Given [C]     1550-Given     1555-Given [C]                sodium chloride 0.9 % flush 10 mL  Dose: 10 mL  Freq: As Needed Route: IV  PRN Reason: Line Care  Start: 06/11/25 0118      1318-MAR Hold                   sodium chloride 0.9 % flush 10 mL  Dose: 10 mL  Freq: Every 12 Hours Scheduled Route: IV  Start: 06/11/25 0136      0253-Given     0819-Canceled Entry [C]     1318-MAR Hold     2100-MAR Hold                sodium chloride 0.9 % flush 3 mL  Dose: 3 mL  Freq: Every 12 Hours Scheduled Route: IV  Start: 06/11/25 1331      1331     2100                  sodium chloride 0.9 % flush 3-10 mL  Dose: 3-10 mL  Freq: As Needed Route: IV  PRN Reason: Line Care  Start: 06/11/25 1329         sodium chloride 0.9 % infusion 40 mL  Dose: 40 mL  Freq: As Needed Route: IV  PRN Reason: Line Care  Start: 06/11/25 0118     Admin Instructions:   Following administration of an IV intermittent medication, flush line with 40mL NS at 100mL/hr.      1318-MAR Hold                   Future Medications  Medications 06/11/25      vancomycin 750 mg in sodium chloride 0.9 % 250 mL IVPB-VTB  Dose: 750 mg  Freq: Every 8 Hours Route: IV  Indications of Use: SKIN AND SOFT TISSUE INFECTION  Start: 06/11/25 2300 End: 06/18/25 1459      2300                   Completed Medications  Medications 06/11/25      ethyl alcohol 62 % 2 each  Dose: 2 Swab  Freq: Once Route: NA  Start: 06/11/25 1459 End: 06/11/25 1502     Admin Instructions:   Administer 15-60 minutes prior to surgery following these steps: Clean nostrils with a tissue, apply a 62% ethyl alcohol swab  to the right nostril gently rotating the swab for 30 seconds, repeat the process with the 2nd swab in the left nostril.      1502-Given                   famotidine (PEPCID) injection 20 mg  Dose: 20 mg  Freq: Once Route: IV  Start: 06/11/25 1331 End: 06/11/25 1356     Admin Instructions:   Give IV push over 2 minutes.      1356-Given                   iopamidol (ISOVUE-370) 76 % injection 100 mL  Dose: 100 mL  Freq: Once in Imaging Route: IV  Start: 06/10/25 2339 End: 06/10/25 2325         ketorolac (TORADOL) injection 15 mg  Dose: 15 mg  Freq: Once Route: IV  Start: 06/10/25 2350 End: 06/10/25 2349     Admin Instructions:   ([facility])    If given for pain, use the following pain scale:  Mild Pain = Pain Score of 1-3, CPOT 1-2  Moderate Pain = Pain Score of 4-6, CPOT 3-4  Severe Pain = Pain Score of 7-10, CPOT 5-8         lidocaine (XYLOCAINE) 1 % injection 10 mL  Dose: 10 mL  Freq: Once Route: INFILTRATION  Start: 06/11/25 0945 End: 06/11/25 0840      0840-Given [C]                   meloxicam (MOBIC) tablet 7.5 mg  Dose: 7.5 mg  Freq: Once Route: PO  Start: 06/11/25 1459 End: 06/11/25 1500     Admin Instructions:   Take with food. Based on patient request - if ordered for moderate or severe pain, provider allows for administration of a medication prescribed for a lower pain scale.  If given for pain, use the following pain scale:  Mild Pain = Pain Score of 1-3, CPOT 1-2  Moderate Pain = Pain Score of 4-6, CPOT 3-4  Severe Pain = Pain Score of 7-10, CPOT 5-8      1500-Given                   morphine injection 4 mg  Dose: 4 mg  Freq: Once Route: IV  Start: 06/10/25 2236 End: 06/10/25 2249     Admin Instructions:   Based on patient request - if ordered for moderate or severe pain, provider allows for administration of a medication prescribed for a lower pain scale.  If given for pain, use the following pain scale:  Mild Pain = Pain Score of 1-3, CPOT 1-2  Moderate Pain = Pain Score of 4-6, CPOT 3-4  Severe Pain = Pain  "Score of 7-10, CPOT 5-8         ondansetron (ZOFRAN) injection 4 mg  Dose: 4 mg  Freq: Once Route: IV  Start: 06/10/25 2236 End: 06/10/25 2248     Admin Instructions:   \"If multiple N/V medications ordered, use in the following order: Ondansetron, Prochlorperazine, Promethazine. Use PO unless patient refuses or patient unable to swallow.\"         pregabalin (LYRICA) capsule 75 mg  Dose: 75 mg  Freq: Once Route: PO  Start: 06/11/25 1459 End: 06/11/25 1500     Admin Instructions:   (MITZI)      1500-Given                   vancomycin IVPB 2000 mg in 0.9% Sodium Chloride 500 mL  Dose: 20 mg/kg  Weight Dosing Info: 94.3 kg  Freq: Once Route: IV  Indications of Use: SKIN AND SOFT TISSUE INFECTION  Start: 06/11/25 1200 End: 06/11/25 1504      1304-New Bag     1337-Restarted [C]                  Discontinued Medications  Medications 06/11/25      cefTRIAXone (ROCEPHIN) 2,000 mg in sodium chloride 0.9 % 100 mL MBP  Dose: 2,000 mg  Freq: Every 24 Hours Route: IV  Indications of Use: SKIN AND SOFT TISSUE INFECTION  Start: 06/11/25 1115 End: 06/11/25 1031     Admin Instructions:   LR should be paused and flushing of the line with NS is recommended prior to and after completion of ceftriaxone infusion due to incompatibility. Do not co-adminster with calcium-containing solutions.  Caution: Look alike/sound alike drug alert         Pharmacy to dose vancomycin  Freq: Continuous PRN Route: XX  PRN Reason: Consult  Indications of Use: SKIN AND SOFT TISSUE INFECTION  Start: 06/11/25 1027 End: 06/11/25 1031                            Consult Notes (last 24 hours)        Aravind Ellsworth,  at 06/11/25 1044        Consult Orders    1. Inpatient Infectious Diseases Consult [[id number]] ordered by Nik Galvin MD at 06/11/25 1027                 Referring Provider: No ref. provider found  Reason for Consultation:     concern for hip infection     Chief Complaint   Patient presents with    Hip Pain    Fever         Subjective " "  History of present illness: Patient is a 54-year-old female presenting with left hip pain.  ID consulted for \"concern for hip infection\".    On presentation patient is afebrile with no leukocytosis.  CRP elevated at 6.8 and lactate is normal.  Blood cultures have been drawn and underwent hip aspiration with 58,000 nucleated cells 89% neutrophils.  Cultures pending.  MRI performed with nonspecific myositis and left hip joint effusion.  Orthopedics consulted.    Patient reports she is feeling slightly better after aspiration today.  States that the significant pain started on Saturday.  States she has had sharp pains for many years that come and go intermittently.  This was more severe waking her up in the middle of the night on Saturday night.  Denies any fevers or chills.  Tolerating antibiotics.  Denies any recent dental work.  Most recently had PFO closure at the end of last year.    Past Medical History:   Diagnosis Date    Hyperlipidemia 5/22/24    Hypertension     improved with weight loss    Implantable loop recorder present 07/2024    Obesity     PFO (patent foramen ovale)     Stroke        Past Surgical History:   Procedure Laterality Date    CARDIAC ELECTROPHYSIOLOGY PROCEDURE N/A 07/09/2024    Procedure: Loop insertion  medtronic;  Surgeon: Mahin Crooks MD;  Location:  [facility];  Service: Cardiovascular;  Laterality: N/A;    PATENT FORAMEN OVALE CLOSURE N/A 12/13/2024    Procedure: Patent foramen ovale closure ABBOTT;  Surgeon: Mahin Crooks MD;  Location:  [facility];  Service: Cardiology;  Laterality: N/A;       family history includes Heart disease in her maternal grandfather; Hyperlipidemia in her father; Hypertension in her father and mother; Stroke in her father.     reports that she has never smoked. She has never been exposed to tobacco smoke. She has never used smokeless tobacco. She reports that she does not currently use alcohol. She " reports that she does not use drugs.     Allergies   Allergen Reactions    Sulfa Antibiotics Hives       Medication:  Antibiotics:  Anti-Infectives (From admission, onward)      Ordered     Dose/Rate Route Frequency Start Stop    06/11/25 1040  cefTRIAXone (ROCEPHIN) 2,000 mg in sodium chloride 0.9 % 100 mL MBP        Ordering Provider: Nik Galvin MD    2,000 mg  200 mL/hr over 30 Minutes Intravenous Every 24 Hours 06/11/25 1130 06/18/25 1129    06/11/25 1027  cefTRIAXone (ROCEPHIN) 2,000 mg in sodium chloride 0.9 % 100 mL MBP  Status:  Discontinued        Ordering Provider: Nik Galvin MD    2,000 mg  200 mL/hr over 30 Minutes Intravenous Every 24 Hours 06/11/25 1115 06/11/25 1031    06/11/25 1040  Pharmacy to dose vancomycin        Ordering Provider: Nik Galvin MD     Not Applicable Continuous PRN 06/11/25 1040 06/18/25 1039    06/11/25 1027  Pharmacy to dose vancomycin  Status:  Discontinued        Ordering Provider: Nik Galvin MD     Not Applicable Continuous PRN 06/11/25 1027 06/11/25 1031              Objective     Physical Exam:   Vital Signs   Temp:  [98.4 °F (36.9 °C)-99.9 °F (37.7 °C)] 98.4 °F (36.9 °C)  Heart Rate:  [65-97] 65  Resp:  [16-20] 16  BP: (118-161)/(57-87) 118/57    GENERAL: Awake and alert, in no acute distress.   HEENT: Oropharynx is clear. Hearing is grossly normal.   EYES: PERRL. No conjunctival injection. No lid lag.   LUNGS: Normal work of breathing.  SKIN: Warm and dry without cutaneous eruptions in exposed areas.  PSYCHIATRIC: Appropriate mood, affect, insight, and judgment.     Results Review:   I reviewed the patient's new clinical results.  I reviewed the patient's new imaging results and agree with the interpretation.  I reviewed the patient's other test results and agree with the interpretation    Lab Results   Component Value Date    WBC 10.60 06/11/2025    HGB 10.3 (L) 06/11/2025    HCT 32.0 (L) 06/11/2025    MCV 90.1 06/11/2025      "06/11/2025       No results found for: \"VANCOPEAK\", \"VANCOTROUGH\", \"VANCORANDOM\"    Lab Results   Component Value Date    GLUCOSE 95 06/11/2025    BUN 12.0 06/11/2025    CREATININE 0.72 06/11/2025    EGFRIFAFRI >60 12/15/2020    BCR 16.7 06/11/2025    CO2 24.0 06/11/2025    CALCIUM 8.5 (L) 06/11/2025    ALBUMIN 4.0 06/10/2025    LABIL2 1.3 12/15/2020    AST 27 06/10/2025    ALT 37 (H) 06/10/2025         Estimated Creatinine Clearance: 95.6 mL/min (by C-G formula based on SCr of 0.72 mg/dL).    Isolation:   No active isolations      Microbiology:  Microbiology Results (last 10 days)       ** No results found for the last 240 hours. **           Component      Latest Ref Rng 6/11/2025   Color, Fluid Red    Appearance, Fluid      Clear  Cloudy !    RBC, Fluid      /mm3 40,000    Nucleated Cells, Fluid      /mm3 58,720    Method: Automated Sysmex XN Method    Neutrophils, Fluid      % 89    Lymphocytes, Fluid      % 8    Monocytes, Fluid      % 3       Legend:  ! Abnormal    Radiology:  MRI of the left hip report reviewed with nonspecific large left hip joint effusion and edema.  Mild edema in the surrounding musculature that could be nonspecific myositis.    Assessment     #Concern for left hip native septic arthritis    Aspiration with elevated WBC count concerning for infection.  Blood cultures and joint aspiration culture pending.  Continue empiric coverage with vancomycin goal -600 and ceftriaxone 2 g daily.  Will follow-up culture and de-escalate as indicated by cultures.      Thank you for this consult.  We will continue to follow along and tailor antibiotics as the patient's clinical course evolves.  ------------------------------------------------------------------------------------------------  The above decisions regarding prescribing and/or discontinuation of antimicrobials incorporate elements of engaging in complex medical decision-making associated with antimicrobial therapy including consideration " of patient-specific resistance patterns, consideration of local resistance patterns, the potential to develop resistant strains or new infections while on antimicrobial therapy, patient's recent antibiotic exposure or lack thereof, interactions between antibiotics and other medications, consideration of patient's other co-morbidities in prescribing antibiotic therapy, public health considerations such as risk of transmission of infection or lack thereof, and public health considerations to minimize development of antimicrobial resistance in the community.     The patient was provided education re: his or her specific antimicrobial selection including indications, benefits, and risks.        Electronically signed by Aravind Ellsworth DO at 06/11/25 1127       Ammon Aly MD at 06/11/25 0716        Consult Orders    1. Ortho (on-call MD unless specified) [[id number]] ordered by Antolin Blunt MD at 06/11/25 0030                    Orthopedic Consult      Patient: Linda Castano    Date of Admission: 6/10/2025  9:58 PM    YOB: 1970    Medical Record Number: [id number]    Consulting Physician: Nik Galvin MD    Chief Complaints: Left hip pain    History of Present Illness: 54 y.o. female admitted to [facility] to services of Nik Galvin MD with left hip pain and difficulty weightbearing.  Patient states she has had on and off hip pain for some time.  The hip would flareup after several days it would get better.  However over the past few days she states the pain is not got better got worse and difficulty weightbearing, walking and moving the hip she presented to ER where she was evaluated.  Orthopedics consulted for evaluation management.  She denies any injuries.  She states her hip is feeling better this morning but she was given some pain medicine.  She notes that the discomfort is more in the groin region.  But also noticed pain down the front of the leg past  the knee to the mid lower leg region.  Denies any numbness or tingling. denies any recent illnesses.  But does report she had a fever at home but has been afebrile since arrival to the hospital.    Allergies:   Allergies   Allergen Reactions    Sulfa Antibiotics Hives       Home Medications:    Current Facility-Administered Medications:     acetaminophen (TYLENOL) tablet 650 mg, 650 mg, Oral, Q4H PRN **OR** acetaminophen (TYLENOL) 160 MG/5ML oral solution 650 mg, 650 mg, Oral, Q4H PRN **OR** acetaminophen (TYLENOL) suppository 650 mg, 650 mg, Rectal, Q4H PRN, Loly Brooks APRN    sennosides-docusate (PERICOLACE) 8.6-50 MG per tablet 2 tablet, 2 tablet, Oral, BID PRN **AND** polyethylene glycol (MIRALAX) packet 17 g, 17 g, Oral, Daily PRN **AND** bisacodyl (DULCOLAX) EC tablet 5 mg, 5 mg, Oral, Daily PRN **AND** bisacodyl (DULCOLAX) suppository 10 mg, 10 mg, Rectal, Daily PRN, Loly Brooks APRN    calcium carbonate (TUMS) chewable tablet 500 mg (200 mg elemental), 2 tablet, Oral, BID PRN, Loly Brooks APRN    HYDROcodone-acetaminophen (NORCO) 5-325 MG per tablet 1 tablet, 1 tablet, Oral, Q6H PRN, Loly Brooks APRN    morphine injection 2 mg, 2 mg, Intravenous, Q3H PRN, Loly Brooks APRN, 2 mg at 06/11/25 0446    ondansetron ODT (ZOFRAN-ODT) disintegrating tablet 4 mg, 4 mg, Oral, Q6H PRN **OR** ondansetron (ZOFRAN) injection 4 mg, 4 mg, Intravenous, Q6H PRN, Loly Brooks APRN    sodium chloride 0.9 % flush 10 mL, 10 mL, Intravenous, Q12H, Loly Brooks APRN, 10 mL at 06/11/25 0253    sodium chloride 0.9 % flush 10 mL, 10 mL, Intravenous, PRN, Loly Brooks APRN    sodium chloride 0.9 % infusion 40 mL, 40 mL, Intravenous, PRN, Loly Brooks APRN    Current Medications:  Scheduled Meds:sodium chloride, 10 mL, Intravenous, Q12H      Continuous Infusions:   PRN Meds:.  acetaminophen **OR** acetaminophen **OR** acetaminophen    senna-docusate  sodium **AND** polyethylene glycol **AND** bisacodyl **AND** bisacodyl    calcium carbonate    HYDROcodone-acetaminophen    Morphine    ondansetron ODT **OR** ondansetron    sodium chloride    sodium chloride    Past Medical History:   Diagnosis Date    Hyperlipidemia 5/22/24    Hypertension     improved with weight loss    Implantable loop recorder present 07/2024    Obesity     PFO (patent foramen ovale)     Stroke        Past Surgical History:   Procedure Laterality Date    CARDIAC ELECTROPHYSIOLOGY PROCEDURE N/A 07/09/2024    Procedure: Loop insertion  medtronic;  Surgeon: Mahin Crooks MD;  Location:  [facility];  Service: Cardiovascular;  Laterality: N/A;    PATENT FORAMEN OVALE CLOSURE N/A 12/13/2024    Procedure: Patent foramen ovale closure ABBOTT;  Surgeon: Mahin Crooks MD;  Location:  [facility];  Service: Cardiology;  Laterality: N/A;       Social History     Occupational History    Not on file   Tobacco Use    Smoking status: Never     Passive exposure: Never    Smokeless tobacco: Never   Vaping Use    Vaping status: Never Used   Substance and Sexual Activity    Alcohol use: Not Currently     Comment: No longer drinking alcohol    Drug use: Never    Sexual activity: Not Currently     Birth control/protection: Post-menopausal      Social History     Social History Narrative    Not on file       Family History   Problem Relation Age of Onset    Hypertension Mother     Stroke Father     Hypertension Father     Hyperlipidemia Father     Heart disease Maternal Grandfather        Review of Systems:     Constitutional:  Denies fever, shaking or chills     All other pertinent positives and negatives as noted above in HPI.    Physical Exam: 54 y.o. female    Vitals:    06/11/25 0103 06/11/25 0122 06/11/25 0154 06/11/25 0252   BP:    118/57   BP Location:    Right arm   Patient Position:    Lying   Pulse: 78 84 77 65   Resp:    16   Temp:    98.4 °F (36.9 °C)  "  TempSrc:    Oral   SpO2: 95% 95% 94% 99%   Weight:    94.3 kg (207 lb 14.3 oz)   Height:    157.5 cm (62\")     General:  Awake, alert. No acute distress.          Extremities: Left lower extremity:  Skin appears benign without obvious lacerations, ulcerations or lesions.  No gross deformity of malalignment noted.  Compartments soft without evidence for DVT or compartment syndrome.  No atrophy.  No palpable masses or adenopathy.  Minimal tenderness laterally.  Gentle hip range of motion was fairly well tolerated.  Denied any pain with axial loading of the left hip.  Motor and sensory to baseline distally.      All other extremities atraumatic without gross abnormality.     Diagnostic Tests:    Admission on 06/10/2025   Component Date Value Ref Range Status    Glucose 06/10/2025 107 (H)  65 - 99 mg/dL Final    BUN 06/10/2025 14.0  6.0 - 20.0 mg/dL Final    Creatinine 06/10/2025 0.71  0.57 - 1.00 mg/dL Final    Sodium 06/10/2025 137  136 - 145 mmol/L Final    Potassium 06/10/2025 4.7  3.5 - 5.2 mmol/L Final    Chloride 06/10/2025 104  98 - 107 mmol/L Final    CO2 06/10/2025 22.0  22.0 - 29.0 mmol/L Final    Calcium 06/10/2025 9.1  8.6 - 10.5 mg/dL Final    Total Protein 06/10/2025 7.2  6.0 - 8.5 g/dL Final    Albumin 06/10/2025 4.0  3.5 - 5.2 g/dL Final    ALT (SGPT) 06/10/2025 37 (H)  1 - 33 U/L Final    AST (SGOT) 06/10/2025 27  1 - 32 U/L Final    Alkaline Phosphatase 06/10/2025 133 (H)  39 - 117 U/L Final    Total Bilirubin 06/10/2025 0.4  0.0 - 1.2 mg/dL Final    Globulin 06/10/2025 3.2  gm/dL Final    A/G Ratio 06/10/2025 1.3  g/dL Final    BUN/Creatinine Ratio 06/10/2025 19.7  7.0 - 25.0 Final    Anion Gap 06/10/2025 11.0  5.0 - 15.0 mmol/L Final    eGFR 06/10/2025 101.2  >60.0 mL/min/1.73 Final    Color, UA 06/10/2025 Yellow  Yellow, Straw Final    Appearance, UA 06/10/2025 Clear  Clear Final    pH, UA 06/10/2025 5.5  5.0 - 8.0 Final    Specific Gravity, UA 06/10/2025 1.019  1.005 - 1.030 Final    Glucose, UA " 06/10/2025 Negative  Negative Final    Ketones, UA 06/10/2025 15 mg/dL (1+) (A)  Negative Final    Bilirubin, UA 06/10/2025 Negative  Negative Final    Blood, UA 06/10/2025 Negative  Negative Final    Protein, UA 06/10/2025 Negative  Negative Final    Leuk Esterase, UA 06/10/2025 Small (1+) (A)  Negative Final    Nitrite, UA 06/10/2025 Negative  Negative Final    Urobilinogen, UA 06/10/2025 0.2 E.U./dL  0.2 - 1.0 E.U./dL Final    WBC 06/10/2025 10.56  3.40 - 10.80 10*3/mm3 Final    RBC 06/10/2025 3.91  3.77 - 5.28 10*6/mm3 Final    Hemoglobin 06/10/2025 11.2 (L)  12.0 - 15.9 g/dL Final    Hematocrit 06/10/2025 33.7 (L)  34.0 - 46.6 % Final    MCV 06/10/2025 86.2  79.0 - 97.0 fL Final    MCH 06/10/2025 28.6  26.6 - 33.0 pg Final    MCHC 06/10/2025 33.2  31.5 - 35.7 g/dL Final    RDW 06/10/2025 13.2  12.3 - 15.4 % Final    RDW-SD 06/10/2025 41.3  37.0 - 54.0 fl Final    MPV 06/10/2025 9.4  6.0 - 12.0 fL Final    Platelets 06/10/2025 305  140 - 450 10*3/mm3 Final    Neutrophil % 06/10/2025 77.4 (H)  42.7 - 76.0 % Final    Lymphocyte % 06/10/2025 13.4 (L)  19.6 - 45.3 % Final    Monocyte % 06/10/2025 7.9  5.0 - 12.0 % Final    Eosinophil % 06/10/2025 0.5  0.3 - 6.2 % Final    Basophil % 06/10/2025 0.3  0.0 - 1.5 % Final    Immature Grans % 06/10/2025 0.5  0.0 - 0.5 % Final    Neutrophils, Absolute 06/10/2025 8.18 (H)  1.70 - 7.00 10*3/mm3 Final    Lymphocytes, Absolute 06/10/2025 1.42  0.70 - 3.10 10*3/mm3 Final    Monocytes, Absolute 06/10/2025 0.83  0.10 - 0.90 10*3/mm3 Final    Eosinophils, Absolute 06/10/2025 0.05  0.00 - 0.40 10*3/mm3 Final    Basophils, Absolute 06/10/2025 0.03  0.00 - 0.20 10*3/mm3 Final    Immature Grans, Absolute 06/10/2025 0.05  0.00 - 0.05 10*3/mm3 Final    nRBC 06/10/2025 0.0  0.0 - 0.2 /100 WBC Final    Lipase 06/10/2025 20  13 - 60 U/L Final    Sed Rate 06/10/2025 43 (H)  0 - 30 mm/hr Final    C-Reactive Protein 06/10/2025 6.89 (H)  0.00 - 0.50 mg/dL Final    Lactate 06/10/2025 0.8  0.5 -  2.0 mmol/L Final    RBC, UA 06/10/2025 0-2  None Seen, 0-2 /HPF Final    WBC, UA 06/10/2025 3-5 (A)  None Seen, 0-2 /HPF Final    Urine culture not indicated.    Bacteria, UA 06/10/2025 None Seen  None Seen /HPF Final    Squamous Epithelial Cells, UA 06/10/2025 0-2  None Seen, 0-2 /HPF Final    Hyaline Casts, UA 06/10/2025 None Seen  None Seen /LPF Final    Methodology 06/10/2025 Automated Microscopy   Final     Lab Results (last 24 hours)       Procedure Component Value Units Date/Time    Lactic Acid, Plasma [[id number]]  (Normal) Collected: 06/10/25 2353    Specimen: Blood Updated: 06/11/25 0039     Lactate 0.8 mmol/L     Urinalysis With Culture If Indicated - Urine, Clean Catch [[id number]]  (Abnormal) Collected: 06/10/25 2350    Specimen: Urine, Clean Catch Updated: 06/11/25 0014     Color, UA Yellow     Appearance, UA Clear     pH, UA 5.5     Specific Gravity, UA 1.019     Glucose, UA Negative     Ketones, UA 15 mg/dL (1+)     Bilirubin, UA Negative     Blood, UA Negative     Protein, UA Negative     Leuk Esterase, UA Small (1+)     Nitrite, UA Negative     Urobilinogen, UA 0.2 E.U./dL    Narrative:      In absence of clinical symptoms, the presence of pyuria, bacteria, and/or nitrites on the urinalysis result does not correlate with infection.    Urinalysis, Microscopic Only - Urine, Clean Catch [[id number]]  (Abnormal) Collected: 06/10/25 2350    Specimen: Urine, Clean Catch Updated: 06/11/25 0014     RBC, UA 0-2 /HPF      WBC, UA 3-5 /HPF      Comment: Urine culture not indicated.        Bacteria, UA None Seen /HPF      Squamous Epithelial Cells, UA 0-2 /HPF      Hyaline Casts, UA None Seen /LPF      Methodology Automated Microscopy    Blood Culture - Blood, Arm, Left [[id number]] Collected: 06/10/25 2353    Specimen: Blood from Arm, Left Updated: 06/11/25 0003    Blood Culture - Blood, Arm, Right [[id number]] Collected: 06/10/25 2359    Specimen: Blood from Arm, Right Updated: 06/11/25 0003    Lipase [[id number]]   (Normal) Collected: 06/10/25 2243    Specimen: Blood Updated: 06/10/25 2359     Lipase 20 U/L     C-reactive Protein [[id number]]  (Abnormal) Collected: 06/10/25 2243    Specimen: Blood Updated: 06/10/25 2359     C-Reactive Protein 6.89 mg/dL     Sedimentation Rate [[id number]]  (Abnormal) Collected: 06/10/25 2243    Specimen: Blood Updated: 06/10/25 2339     Sed Rate 43 mm/hr     Comprehensive Metabolic Panel [[id number]]  (Abnormal) Collected: 06/10/25 2243    Specimen: Blood Updated: 06/10/25 2322     Glucose 107 mg/dL      BUN 14.0 mg/dL      Creatinine 0.71 mg/dL      Sodium 137 mmol/L      Potassium 4.7 mmol/L      Chloride 104 mmol/L      CO2 22.0 mmol/L      Calcium 9.1 mg/dL      Total Protein 7.2 g/dL      Albumin 4.0 g/dL      ALT (SGPT) 37 U/L      AST (SGOT) 27 U/L      Alkaline Phosphatase 133 U/L      Total Bilirubin 0.4 mg/dL      Globulin 3.2 gm/dL      A/G Ratio 1.3 g/dL      BUN/Creatinine Ratio 19.7     Anion Gap 11.0 mmol/L      eGFR 101.2 mL/min/1.73     Narrative:      GFR Categories in Chronic Kidney Disease (CKD)              GFR Category          GFR (mL/min/1.73)    Interpretation  G1                    90 or greater        Normal or high (1)  G2                    60-89                Mild decrease (1)  G3a                   45-59                Mild to moderate decrease  G3b                   30-44                Moderate to severe decrease  G4                    15-29                Severe decrease  G5                    14 or less           Kidney failure    (1)In the absence of evidence of kidney disease, neither GFR category G1 or G2 fulfill the criteria for CKD.    eGFR calculation 2021 CKD-EPI creatinine equation, which does not include race as a factor    CBC & Differential [[id number]]  (Abnormal) Collected: 06/10/25 2243    Specimen: Blood Updated: 06/10/25 2303    Narrative:      The following orders were created for panel order CBC & Differential.  Procedure                                Abnormality         Status                     ---------                               -----------         ------                     CBC Auto Differential[[id number]]        Abnormal            Final result                 Please view results for these tests on the individual orders.    CBC Auto Differential [[id number]]  (Abnormal) Collected: 06/10/25 2243    Specimen: Blood Updated: 06/10/25 2303     WBC 10.56 10*3/mm3      RBC 3.91 10*6/mm3      Hemoglobin 11.2 g/dL      Hematocrit 33.7 %      MCV 86.2 fL      MCH 28.6 pg      MCHC 33.2 g/dL      RDW 13.2 %      RDW-SD 41.3 fl      MPV 9.4 fL      Platelets 305 10*3/mm3      Neutrophil % 77.4 %      Lymphocyte % 13.4 %      Monocyte % 7.9 %      Eosinophil % 0.5 %      Basophil % 0.3 %      Immature Grans % 0.5 %      Neutrophils, Absolute 8.18 10*3/mm3      Lymphocytes, Absolute 1.42 10*3/mm3      Monocytes, Absolute 0.83 10*3/mm3      Eosinophils, Absolute 0.05 10*3/mm3      Basophils, Absolute 0.03 10*3/mm3      Immature Grans, Absolute 0.05 10*3/mm3      nRBC 0.0 /100 WBC           Labs reviewed white count normal.  Elevated CRP and ESR noted.    Imaging: CT scan MRI reviewed.  MRI does not have official report but does appear to show fluid within the left hip joint.    Assessment: Left hip pain    Plan: I discussed the findings with the patient.  Told her we would await MRI findings however with evidence of likely joint fluid about the hip joint I think it is pertinent to rule out any infectious process within the joint itself.  Stat IR aspiration left hip joint and labs have been ordered.  I discussed with the patient and her family that if that is positive or there is any concern of infectious process surgical intervention consisting of open irrigation and debridement may be warranted.  Patient is understanding of this.  We will await labs we will keep her n.p.o. at this time.  Continue pain control.  Holding antibiotics until aspiration is  completed.    Please call with any questions or concerns thank you    Date: 6/11/2025    Ammon Aly MD    CC: Nik Galvin MD         Electronically signed by Ammon Aly MD at 06/11/25 1121

## 2025-06-11 NOTE — CONSULTS
Orthopedic Consult      Patient: Linda Castano    Date of Admission: 6/10/2025  9:58 PM    YOB: 1970    Medical Record Number: [id number]    Consulting Physician: Nik Galvin MD    Chief Complaints: Left hip pain    History of Present Illness: 54 y.o. female admitted to [facility] to services of Nik Galvin MD with left hip pain and difficulty weightbearing.  Patient states she has had on and off hip pain for some time.  The hip would flareup after several days it would get better.  However over the past few days she states the pain is not got better got worse and difficulty weightbearing, walking and moving the hip she presented to ER where she was evaluated.  Orthopedics consulted for evaluation management.  She denies any injuries.  She states her hip is feeling better this morning but she was given some pain medicine.  She notes that the discomfort is more in the groin region.  But also noticed pain down the front of the leg past the knee to the mid lower leg region.  Denies any numbness or tingling. denies any recent illnesses.  But does report she had a fever at home but has been afebrile since arrival to the hospital.    Allergies:   Allergies   Allergen Reactions    Sulfa Antibiotics Hives       Home Medications:    Current Facility-Administered Medications:     acetaminophen (TYLENOL) tablet 650 mg, 650 mg, Oral, Q4H PRN **OR** acetaminophen (TYLENOL) 160 MG/5ML oral solution 650 mg, 650 mg, Oral, Q4H PRN **OR** acetaminophen (TYLENOL) suppository 650 mg, 650 mg, Rectal, Q4H PRN, Loly Brooks APRN    sennosides-docusate (PERICOLACE) 8.6-50 MG per tablet 2 tablet, 2 tablet, Oral, BID PRN **AND** polyethylene glycol (MIRALAX) packet 17 g, 17 g, Oral, Daily PRN **AND** bisacodyl (DULCOLAX) EC tablet 5 mg, 5 mg, Oral, Daily PRN **AND** bisacodyl (DULCOLAX) suppository 10 mg, 10 mg, Rectal, Daily PRN, Loly Brooks APRN    calcium carbonate (TUMS)  chewable tablet 500 mg (200 mg elemental), 2 tablet, Oral, BID PRN, Loly Brooks, APRRINA    HYDROcodone-acetaminophen (NORCO) 5-325 MG per tablet 1 tablet, 1 tablet, Oral, Q6H PRN, Loly Brooks APRN    morphine injection 2 mg, 2 mg, Intravenous, Q3H PRN, Loly Brooks APRN, 2 mg at 06/11/25 0446    ondansetron ODT (ZOFRAN-ODT) disintegrating tablet 4 mg, 4 mg, Oral, Q6H PRN **OR** ondansetron (ZOFRAN) injection 4 mg, 4 mg, Intravenous, Q6H PRN, Loly Brooks APRN    sodium chloride 0.9 % flush 10 mL, 10 mL, Intravenous, Q12H, Loly Brooks APRN, 10 mL at 06/11/25 0253    sodium chloride 0.9 % flush 10 mL, 10 mL, Intravenous, PRN, Loly Brooks APRN    sodium chloride 0.9 % infusion 40 mL, 40 mL, Intravenous, PRN, Loly Brooks APRRINA    Current Medications:  Scheduled Meds:sodium chloride, 10 mL, Intravenous, Q12H      Continuous Infusions:   PRN Meds:.  acetaminophen **OR** acetaminophen **OR** acetaminophen    senna-docusate sodium **AND** polyethylene glycol **AND** bisacodyl **AND** bisacodyl    calcium carbonate    HYDROcodone-acetaminophen    Morphine    ondansetron ODT **OR** ondansetron    sodium chloride    sodium chloride    Past Medical History:   Diagnosis Date    Hyperlipidemia 5/22/24    Hypertension     improved with weight loss    Implantable loop recorder present 07/2024    Obesity     PFO (patent foramen ovale)     Stroke        Past Surgical History:   Procedure Laterality Date    CARDIAC ELECTROPHYSIOLOGY PROCEDURE N/A 07/09/2024    Procedure: Loop insertion  medtronic;  Surgeon: Mahin Crooks MD;  Location: [facility] CATH INVASIVE LOCATION;  Service: Cardiovascular;  Laterality: N/A;    PATENT FORAMEN OVALE CLOSURE N/A 12/13/2024    Procedure: Patent foramen ovale closure ABBOTT;  Surgeon: Mahin Crooks MD;  Location: [facility] CATH INVASIVE LOCATION;  Service: Cardiology;  Laterality: N/A;       Social History     Occupational  "History    Not on file   Tobacco Use    Smoking status: Never     Passive exposure: Never    Smokeless tobacco: Never   Vaping Use    Vaping status: Never Used   Substance and Sexual Activity    Alcohol use: Not Currently     Comment: No longer drinking alcohol    Drug use: Never    Sexual activity: Not Currently     Birth control/protection: Post-menopausal      Social History     Social History Narrative    Not on file       Family History   Problem Relation Age of Onset    Hypertension Mother     Stroke Father     Hypertension Father     Hyperlipidemia Father     Heart disease Maternal Grandfather        Review of Systems:     Constitutional:  Denies fever, shaking or chills     All other pertinent positives and negatives as noted above in HPI.    Physical Exam: 54 y.o. female    Vitals:    06/11/25 0103 06/11/25 0122 06/11/25 0154 06/11/25 0252   BP:    118/57   BP Location:    Right arm   Patient Position:    Lying   Pulse: 78 84 77 65   Resp:    16   Temp:    98.4 °F (36.9 °C)   TempSrc:    Oral   SpO2: 95% 95% 94% 99%   Weight:    94.3 kg (207 lb 14.3 oz)   Height:    157.5 cm (62\")     General:  Awake, alert. No acute distress.          Extremities: Left lower extremity:  Skin appears benign without obvious lacerations, ulcerations or lesions.  No gross deformity of malalignment noted.  Compartments soft without evidence for DVT or compartment syndrome.  No atrophy.  No palpable masses or adenopathy.  Minimal tenderness laterally.  Gentle hip range of motion was fairly well tolerated.  Denied any pain with axial loading of the left hip.  Motor and sensory to baseline distally.      All other extremities atraumatic without gross abnormality.     Diagnostic Tests:    Admission on 06/10/2025   Component Date Value Ref Range Status    Glucose 06/10/2025 107 (H)  65 - 99 mg/dL Final    BUN 06/10/2025 14.0  6.0 - 20.0 mg/dL Final    Creatinine 06/10/2025 0.71  0.57 - 1.00 mg/dL Final    Sodium 06/10/2025 137  136 " - 145 mmol/L Final    Potassium 06/10/2025 4.7  3.5 - 5.2 mmol/L Final    Chloride 06/10/2025 104  98 - 107 mmol/L Final    CO2 06/10/2025 22.0  22.0 - 29.0 mmol/L Final    Calcium 06/10/2025 9.1  8.6 - 10.5 mg/dL Final    Total Protein 06/10/2025 7.2  6.0 - 8.5 g/dL Final    Albumin 06/10/2025 4.0  3.5 - 5.2 g/dL Final    ALT (SGPT) 06/10/2025 37 (H)  1 - 33 U/L Final    AST (SGOT) 06/10/2025 27  1 - 32 U/L Final    Alkaline Phosphatase 06/10/2025 133 (H)  39 - 117 U/L Final    Total Bilirubin 06/10/2025 0.4  0.0 - 1.2 mg/dL Final    Globulin 06/10/2025 3.2  gm/dL Final    A/G Ratio 06/10/2025 1.3  g/dL Final    BUN/Creatinine Ratio 06/10/2025 19.7  7.0 - 25.0 Final    Anion Gap 06/10/2025 11.0  5.0 - 15.0 mmol/L Final    eGFR 06/10/2025 101.2  >60.0 mL/min/1.73 Final    Color, UA 06/10/2025 Yellow  Yellow, Straw Final    Appearance, UA 06/10/2025 Clear  Clear Final    pH, UA 06/10/2025 5.5  5.0 - 8.0 Final    Specific Gravity, UA 06/10/2025 1.019  1.005 - 1.030 Final    Glucose, UA 06/10/2025 Negative  Negative Final    Ketones, UA 06/10/2025 15 mg/dL (1+) (A)  Negative Final    Bilirubin, UA 06/10/2025 Negative  Negative Final    Blood, UA 06/10/2025 Negative  Negative Final    Protein, UA 06/10/2025 Negative  Negative Final    Leuk Esterase, UA 06/10/2025 Small (1+) (A)  Negative Final    Nitrite, UA 06/10/2025 Negative  Negative Final    Urobilinogen, UA 06/10/2025 0.2 E.U./dL  0.2 - 1.0 E.U./dL Final    WBC 06/10/2025 10.56  3.40 - 10.80 10*3/mm3 Final    RBC 06/10/2025 3.91  3.77 - 5.28 10*6/mm3 Final    Hemoglobin 06/10/2025 11.2 (L)  12.0 - 15.9 g/dL Final    Hematocrit 06/10/2025 33.7 (L)  34.0 - 46.6 % Final    MCV 06/10/2025 86.2  79.0 - 97.0 fL Final    MCH 06/10/2025 28.6  26.6 - 33.0 pg Final    MCHC 06/10/2025 33.2  31.5 - 35.7 g/dL Final    RDW 06/10/2025 13.2  12.3 - 15.4 % Final    RDW-SD 06/10/2025 41.3  37.0 - 54.0 fl Final    MPV 06/10/2025 9.4  6.0 - 12.0 fL Final    Platelets 06/10/2025 305   140 - 450 10*3/mm3 Final    Neutrophil % 06/10/2025 77.4 (H)  42.7 - 76.0 % Final    Lymphocyte % 06/10/2025 13.4 (L)  19.6 - 45.3 % Final    Monocyte % 06/10/2025 7.9  5.0 - 12.0 % Final    Eosinophil % 06/10/2025 0.5  0.3 - 6.2 % Final    Basophil % 06/10/2025 0.3  0.0 - 1.5 % Final    Immature Grans % 06/10/2025 0.5  0.0 - 0.5 % Final    Neutrophils, Absolute 06/10/2025 8.18 (H)  1.70 - 7.00 10*3/mm3 Final    Lymphocytes, Absolute 06/10/2025 1.42  0.70 - 3.10 10*3/mm3 Final    Monocytes, Absolute 06/10/2025 0.83  0.10 - 0.90 10*3/mm3 Final    Eosinophils, Absolute 06/10/2025 0.05  0.00 - 0.40 10*3/mm3 Final    Basophils, Absolute 06/10/2025 0.03  0.00 - 0.20 10*3/mm3 Final    Immature Grans, Absolute 06/10/2025 0.05  0.00 - 0.05 10*3/mm3 Final    nRBC 06/10/2025 0.0  0.0 - 0.2 /100 WBC Final    Lipase 06/10/2025 20  13 - 60 U/L Final    Sed Rate 06/10/2025 43 (H)  0 - 30 mm/hr Final    C-Reactive Protein 06/10/2025 6.89 (H)  0.00 - 0.50 mg/dL Final    Lactate 06/10/2025 0.8  0.5 - 2.0 mmol/L Final    RBC, UA 06/10/2025 0-2  None Seen, 0-2 /HPF Final    WBC, UA 06/10/2025 3-5 (A)  None Seen, 0-2 /HPF Final    Urine culture not indicated.    Bacteria, UA 06/10/2025 None Seen  None Seen /HPF Final    Squamous Epithelial Cells, UA 06/10/2025 0-2  None Seen, 0-2 /HPF Final    Hyaline Casts, UA 06/10/2025 None Seen  None Seen /LPF Final    Methodology 06/10/2025 Automated Microscopy   Final     Lab Results (last 24 hours)       Procedure Component Value Units Date/Time    Lactic Acid, Plasma [[id number]]  (Normal) Collected: 06/10/25 2353    Specimen: Blood Updated: 06/11/25 0039     Lactate 0.8 mmol/L     Urinalysis With Culture If Indicated - Urine, Clean Catch [[id number]]  (Abnormal) Collected: 06/10/25 2350    Specimen: Urine, Clean Catch Updated: 06/11/25 0014     Color, UA Yellow     Appearance, UA Clear     pH, UA 5.5     Specific Gravity, UA 1.019     Glucose, UA Negative     Ketones, UA 15 mg/dL (1+)      Bilirubin, UA Negative     Blood, UA Negative     Protein, UA Negative     Leuk Esterase, UA Small (1+)     Nitrite, UA Negative     Urobilinogen, UA 0.2 E.U./dL    Narrative:      In absence of clinical symptoms, the presence of pyuria, bacteria, and/or nitrites on the urinalysis result does not correlate with infection.    Urinalysis, Microscopic Only - Urine, Clean Catch [[id number]]  (Abnormal) Collected: 06/10/25 2350    Specimen: Urine, Clean Catch Updated: 06/11/25 0014     RBC, UA 0-2 /HPF      WBC, UA 3-5 /HPF      Comment: Urine culture not indicated.        Bacteria, UA None Seen /HPF      Squamous Epithelial Cells, UA 0-2 /HPF      Hyaline Casts, UA None Seen /LPF      Methodology Automated Microscopy    Blood Culture - Blood, Arm, Left [[id number]] Collected: 06/10/25 2353    Specimen: Blood from Arm, Left Updated: 06/11/25 0003    Blood Culture - Blood, Arm, Right [[id number]] Collected: 06/10/25 2359    Specimen: Blood from Arm, Right Updated: 06/11/25 0003    Lipase [[id number]]  (Normal) Collected: 06/10/25 2243    Specimen: Blood Updated: 06/10/25 2359     Lipase 20 U/L     C-reactive Protein [[id number]]  (Abnormal) Collected: 06/10/25 2243    Specimen: Blood Updated: 06/10/25 2359     C-Reactive Protein 6.89 mg/dL     Sedimentation Rate [[id number]]  (Abnormal) Collected: 06/10/25 2243    Specimen: Blood Updated: 06/10/25 2339     Sed Rate 43 mm/hr     Comprehensive Metabolic Panel [[id number]]  (Abnormal) Collected: 06/10/25 2243    Specimen: Blood Updated: 06/10/25 2322     Glucose 107 mg/dL      BUN 14.0 mg/dL      Creatinine 0.71 mg/dL      Sodium 137 mmol/L      Potassium 4.7 mmol/L      Chloride 104 mmol/L      CO2 22.0 mmol/L      Calcium 9.1 mg/dL      Total Protein 7.2 g/dL      Albumin 4.0 g/dL      ALT (SGPT) 37 U/L      AST (SGOT) 27 U/L      Alkaline Phosphatase 133 U/L      Total Bilirubin 0.4 mg/dL      Globulin 3.2 gm/dL      A/G Ratio 1.3 g/dL      BUN/Creatinine Ratio 19.7     Anion  Gap 11.0 mmol/L      eGFR 101.2 mL/min/1.73     Narrative:      GFR Categories in Chronic Kidney Disease (CKD)              GFR Category          GFR (mL/min/1.73)    Interpretation  G1                    90 or greater        Normal or high (1)  G2                    60-89                Mild decrease (1)  G3a                   45-59                Mild to moderate decrease  G3b                   30-44                Moderate to severe decrease  G4                    15-29                Severe decrease  G5                    14 or less           Kidney failure    (1)In the absence of evidence of kidney disease, neither GFR category G1 or G2 fulfill the criteria for CKD.    eGFR calculation 2021 CKD-EPI creatinine equation, which does not include race as a factor    CBC & Differential [[id number]]  (Abnormal) Collected: 06/10/25 2243    Specimen: Blood Updated: 06/10/25 2303    Narrative:      The following orders were created for panel order CBC & Differential.  Procedure                               Abnormality         Status                     ---------                               -----------         ------                     CBC Auto Differential[[id number]]        Abnormal            Final result                 Please view results for these tests on the individual orders.    CBC Auto Differential [[id number]]  (Abnormal) Collected: 06/10/25 2243    Specimen: Blood Updated: 06/10/25 2303     WBC 10.56 10*3/mm3      RBC 3.91 10*6/mm3      Hemoglobin 11.2 g/dL      Hematocrit 33.7 %      MCV 86.2 fL      MCH 28.6 pg      MCHC 33.2 g/dL      RDW 13.2 %      RDW-SD 41.3 fl      MPV 9.4 fL      Platelets 305 10*3/mm3      Neutrophil % 77.4 %      Lymphocyte % 13.4 %      Monocyte % 7.9 %      Eosinophil % 0.5 %      Basophil % 0.3 %      Immature Grans % 0.5 %      Neutrophils, Absolute 8.18 10*3/mm3      Lymphocytes, Absolute 1.42 10*3/mm3      Monocytes, Absolute 0.83 10*3/mm3      Eosinophils, Absolute 0.05  10*3/mm3      Basophils, Absolute 0.03 10*3/mm3      Immature Grans, Absolute 0.05 10*3/mm3      nRBC 0.0 /100 WBC           Labs reviewed white count normal.  Elevated CRP and ESR noted.    Imaging: CT scan MRI reviewed.  MRI does not have official report but does appear to show fluid within the left hip joint.    Assessment: Left hip pain    Plan: I discussed the findings with the patient.  Told her we would await MRI findings however with evidence of likely joint fluid about the hip joint I think it is pertinent to rule out any infectious process within the joint itself.  Stat IR aspiration left hip joint and labs have been ordered.  I discussed with the patient and her family that if that is positive or there is any concern of infectious process surgical intervention consisting of open irrigation and debridement may be warranted.  Patient is understanding of this.  We will await labs we will keep her n.p.o. at this time.  Continue pain control.  Holding antibiotics until aspiration is completed.    Please call with any questions or concerns thank you    Date: 6/11/2025    Ammon Aly MD    CC: Nik Galvin MD

## 2025-06-11 NOTE — ED PROVIDER NOTES
EMERGENCY DEPARTMENT ENCOUNTER  Room Number:  16/16  PCP: Sintia Kemp APRN  Independent Historians: Patient       HPI:  Chief Complaint: had concerns including Hip Pain and Fever.     A complete HPI/ROS/PMH/PSH/SH/FH are unobtainable due to: Nothing      Context: Linda Castano is a 54 y.o. female with a medical history of PFO status post closure who presents to the ED c/o acute left hip pain and fever.  She states the pain feels like it is in her groin but it does radiate down the left thigh.  She had fever up to 101 at home.  The pain is partially alleviated by having her hip in partial flexion.  She denies acute back pain.  She denies previous history of osteoarthritis of the hip.  No recent hip injection or other procedure.      Review of prior external notes (non-ED) -and- Review of prior external test results outside of this encounter: See ED course below        PAST MEDICAL HISTORY  Active Ambulatory Problems     Diagnosis Date Noted    Acute CVA (cerebrovascular accident) 05/22/2024    Obesity     PFO with atrial septal aneurysm 05/24/2024    Transient elevated blood pressure 06/28/2024    Hyperlipidemia     Implantable loop recorder present 07/2024    S/P patent foramen ovale closure 01/14/2025     Resolved Ambulatory Problems     Diagnosis Date Noted    TIA (transient ischemic attack) 05/22/2024     Past Medical History:   Diagnosis Date    Hypertension     PFO (patent foramen ovale)     Stroke          PAST SURGICAL HISTORY  Past Surgical History:   Procedure Laterality Date    CARDIAC ELECTROPHYSIOLOGY PROCEDURE N/A 07/09/2024    Procedure: Loop insertion  medtronic;  Surgeon: Mahin Crooks MD;  Location:  [facility];  Service: Cardiovascular;  Laterality: N/A;    PATENT FORAMEN OVALE CLOSURE N/A 12/13/2024    Procedure: Patent foramen ovale closure ABBOTT;  Surgeon: Mahin Crooks MD;  Location:  [facility];  Service: Cardiology;   Laterality: N/A;         FAMILY HISTORY  Family History   Problem Relation Age of Onset    Hypertension Mother     Stroke Father     Hypertension Father     Hyperlipidemia Father     Heart disease Maternal Grandfather          SOCIAL HISTORY  Social History     Socioeconomic History    Marital status:    Tobacco Use    Smoking status: Never     Passive exposure: Never    Smokeless tobacco: Never   Vaping Use    Vaping status: Never Used   Substance and Sexual Activity    Alcohol use: Not Currently     Comment: No longer drinking alcohol    Drug use: Never    Sexual activity: Not Currently     Birth control/protection: Post-menopausal         ALLERGIES  Sulfa antibiotics      REVIEW OF SYSTEMS  Review of all 14 systems is negative other than stated in the HPI above.      PHYSICAL EXAM    I have reviewed the triage vital signs and nursing notes.    ED Triage Vitals   Temp Heart Rate Resp BP SpO2   06/10/25 2005 06/10/25 2005 06/10/25 2005 06/10/25 2010 06/10/25 2005   99.9 °F (37.7 °C) 97 20 161/87 100 %      Temp src Heart Rate Source Patient Position BP Location FiO2 (%)   -- -- 06/10/25 2010 06/10/25 2010 --     Sitting Right arm          GENERAL: awake and alert, appears uncomfortable, no acute distress  HENT: Normocephalic, atraumatic  EYES: no scleral icterus  CV: regular rhythm, regular rate  RESPIRATORY: normal effort  ABDOMEN: soft, nondistended, nontender throughout  MUSCULOSKELETAL: no deformity.  There is mild point tenderness over the left greater trochanter and there is pain with passive ranging of the left hip.  There is not significant point tenderness of the left hip anteriorly.  No midline L-spine tenderness.  No erythema or warmth of the left lower extremity.  NEURO: alert, moves all extremities, follows commands  PSYCH: calm, cooperative  SKIN: Warm, dry          LAB RESULTS  Recent Results (from the past 24 hours)   Comprehensive Metabolic Panel    Collection Time: 06/10/25 10:43 PM     Specimen: Blood   Result Value Ref Range    Glucose 107 (H) 65 - 99 mg/dL    BUN 14.0 6.0 - 20.0 mg/dL    Creatinine 0.71 0.57 - 1.00 mg/dL    Sodium 137 136 - 145 mmol/L    Potassium 4.7 3.5 - 5.2 mmol/L    Chloride 104 98 - 107 mmol/L    CO2 22.0 22.0 - 29.0 mmol/L    Calcium 9.1 8.6 - 10.5 mg/dL    Total Protein 7.2 6.0 - 8.5 g/dL    Albumin 4.0 3.5 - 5.2 g/dL    ALT (SGPT) 37 (H) 1 - 33 U/L    AST (SGOT) 27 1 - 32 U/L    Alkaline Phosphatase 133 (H) 39 - 117 U/L    Total Bilirubin 0.4 0.0 - 1.2 mg/dL    Globulin 3.2 gm/dL    A/G Ratio 1.3 g/dL    BUN/Creatinine Ratio 19.7 7.0 - 25.0    Anion Gap 11.0 5.0 - 15.0 mmol/L    eGFR 101.2 >60.0 mL/min/1.73   CBC Auto Differential    Collection Time: 06/10/25 10:43 PM    Specimen: Blood   Result Value Ref Range    WBC 10.56 3.40 - 10.80 10*3/mm3    RBC 3.91 3.77 - 5.28 10*6/mm3    Hemoglobin 11.2 (L) 12.0 - 15.9 g/dL    Hematocrit 33.7 (L) 34.0 - 46.6 %    MCV 86.2 79.0 - 97.0 fL    MCH 28.6 26.6 - 33.0 pg    MCHC 33.2 31.5 - 35.7 g/dL    RDW 13.2 12.3 - 15.4 %    RDW-SD 41.3 37.0 - 54.0 fl    MPV 9.4 6.0 - 12.0 fL    Platelets 305 140 - 450 10*3/mm3    Neutrophil % 77.4 (H) 42.7 - 76.0 %    Lymphocyte % 13.4 (L) 19.6 - 45.3 %    Monocyte % 7.9 5.0 - 12.0 %    Eosinophil % 0.5 0.3 - 6.2 %    Basophil % 0.3 0.0 - 1.5 %    Immature Grans % 0.5 0.0 - 0.5 %    Neutrophils, Absolute 8.18 (H) 1.70 - 7.00 10*3/mm3    Lymphocytes, Absolute 1.42 0.70 - 3.10 10*3/mm3    Monocytes, Absolute 0.83 0.10 - 0.90 10*3/mm3    Eosinophils, Absolute 0.05 0.00 - 0.40 10*3/mm3    Basophils, Absolute 0.03 0.00 - 0.20 10*3/mm3    Immature Grans, Absolute 0.05 0.00 - 0.05 10*3/mm3    nRBC 0.0 0.0 - 0.2 /100 WBC   Lipase    Collection Time: 06/10/25 10:43 PM    Specimen: Blood   Result Value Ref Range    Lipase 20 13 - 60 U/L   Sedimentation Rate    Collection Time: 06/10/25 10:43 PM    Specimen: Blood   Result Value Ref Range    Sed Rate 43 (H) 0 - 30 mm/hr   C-reactive Protein    Collection  Time: 06/10/25 10:43 PM    Specimen: Blood   Result Value Ref Range    C-Reactive Protein 6.89 (H) 0.00 - 0.50 mg/dL   Urinalysis With Culture If Indicated - Urine, Clean Catch    Collection Time: 06/10/25 11:50 PM    Specimen: Urine, Clean Catch   Result Value Ref Range    Color, UA Yellow Yellow, Straw    Appearance, UA Clear Clear    pH, UA 5.5 5.0 - 8.0    Specific Gravity, UA 1.019 1.005 - 1.030    Glucose, UA Negative Negative    Ketones, UA 15 mg/dL (1+) (A) Negative    Bilirubin, UA Negative Negative    Blood, UA Negative Negative    Protein, UA Negative Negative    Leuk Esterase, UA Small (1+) (A) Negative    Nitrite, UA Negative Negative    Urobilinogen, UA 0.2 E.U./dL 0.2 - 1.0 E.U./dL   Urinalysis, Microscopic Only - Urine, Clean Catch    Collection Time: 06/10/25 11:50 PM    Specimen: Urine, Clean Catch   Result Value Ref Range    RBC, UA 0-2 None Seen, 0-2 /HPF    WBC, UA 3-5 (A) None Seen, 0-2 /HPF    Bacteria, UA None Seen None Seen /HPF    Squamous Epithelial Cells, UA 0-2 None Seen, 0-2 /HPF    Hyaline Casts, UA None Seen None Seen /LPF    Methodology Automated Microscopy    Lactic Acid, Plasma    Collection Time: 06/10/25 11:53 PM    Specimen: Blood   Result Value Ref Range    Lactate 0.8 0.5 - 2.0 mmol/L       The above labs were ordered by me and independently reviewed by me.     RADIOLOGY  CT Abdomen Pelvis With Contrast  Addendum Date: 6/11/2025  Patient does have a left hip effusion. No aggressive osseous abnormalities are seen.  This report was finalized on 6/11/2025 12:58 AM by Dr. Evelyn Carlos M.D on Workstation: [id number]      Result Date: 6/11/2025  CT OF THE ABDOMEN PELVIS WITH CONTRAST  HISTORY: Left flank pain and fever  COMPARISON: None available.  TECHNIQUE: Axial CT imaging was obtained through the abdomen and pelvis. IV contrast was administered.  FINDINGS: The patient is status post closure of a patent foramen ovale. Images through the lung bases are degraded by motion  artifact. There is a potential nodule within the left lower lobe, measuring 5 mm. No suspicious hepatic lesions are seen. The stomach, adrenal glands, spleen, pancreas, and gallbladder all appear normal there is a duodenal diverticulum.. The kidneys enhance symmetrically. There is no hydronephrosis. There are bilateral parapelvic cysts. Uterus appears to normal. No adnexal masses are seen. No distal ureteral or bladder stones are seen. There is colonic diverticulosis. There is no bowel obstruction. The appendix is normal. No acute osseous abnormalities are seen.      1. No acute intra-abdominal or intrapelvic process seen.  2. Indeterminate solid pulmonary nodule measuring 5 mm. In a low-risk patient with a solid nodule <6 mm, recommend no follow-up. In a high-risk patient, CT at 12 months is optional with stronger consideration if there is suspicious nodule morphology and/or upper lobe location.  Radiation dose reduction techniques were utilized, including automated exposure control and exposure modulation based on body size.   This report was finalized on 6/11/2025 12:12 AM by Dr. Evelyn Carlos M.D on Workstation: [id number]        The above radiology studies were ordered by me.  See ED course for independent interpretations.     MEDICATIONS GIVEN IN ER  Medications   morphine injection 4 mg (4 mg Intravenous Given 6/10/25 2249)   ondansetron (ZOFRAN) injection 4 mg (4 mg Intravenous Given 6/10/25 2248)   iopamidol (ISOVUE-370) 76 % injection 100 mL (100 mL Intravenous Given 6/10/25 2325)   ketorolac (TORADOL) injection 15 mg (15 mg Intravenous Given 6/10/25 2349)         ORDERS PLACED DURING THIS VISIT:  Orders Placed This Encounter   Procedures    Blood Culture - Blood,    Blood Culture - Blood,    CT Abdomen Pelvis With Contrast    MRI Hip Left Without Contrast    Comprehensive Metabolic Panel    Urinalysis With Culture If Indicated - Urine, Clean Catch    CBC Auto Differential    Lipase    Sedimentation  Rate    C-reactive Protein    Lactic Acid, Plasma    Urinalysis, Microscopic Only - Urine, Clean Catch    [facility] (on-call MD unless specified) Details    Ortho (on-call MD unless specified)    Inpatient Admission    CBC & Differential         OUTPATIENT MEDICATION MANAGEMENT:  No current Epic-ordered facility-administered medications on file.     Current Outpatient Medications Ordered in Epic   Medication Sig Dispense Refill    amoxicillin (AMOXIL) 500 MG capsule Take 4 capsules by mouth See Admin Instructions. 4 capsules 1 hour prior to procedure 12 capsule 0    Aspirin Low Dose 81 MG EC tablet Take 1 tablet by mouth Daily.      clopidogrel (PLAVIX) 75 MG tablet Take 1 tablet by mouth Daily. 90 tablet 0    [employer] TRIPLE MAGNESIUM COMPLEX PO Take 300 mg by mouth Every Night.      multivitamin with minerals tablet tablet Take 1 tablet by mouth Daily.           PROCEDURES  Procedures            PROGRESS, DATA ANALYSIS, CONSULTS, AND MEDICAL DECISION MAKING  All labs have been independently interpreted by me.  All radiology studies have been reviewed by me. All EKG's have been independently viewed and interpreted by me.  Discussion below represents my analysis of pertinent findings related to patient's condition, differential diagnosis, treatment plan and final disposition.    Differential diagnosis includes but is not limited to:  Lumbar radiculopathy  Trochanteric bursitis  Septic arthritis  Tenosynovitis      Clinical Scores:                  ED Course as of 06/11/25 0118   Tue Kumar 10, 2025   2218 First look: Patient presents to the ED for evaluation of worsening left hip pain for 4 days.  She states it is worse with walking around, goes down the front of her thigh, knee, and the outside of her lower leg.  No trauma.  She also notes some abdominal pain that is worse on the left side.  She states she had a fever of 101 at home prior to arrival today.  She had been taking ibuprofen for pain previously but none today.   Denies any urinary symptoms, nausea, vomiting    On exam patient is uncomfortable appearing, low-grade temperature 99.9.  She does have left lumbar paraspinal muscle tenderness and left-sided abdominal tenderness on exam.  She has moderate discomfort with  internal and external rotation of left hip after passive flexion    Will obtain basic labs, urine, CT abdomen pelvis.  Give IV morphine for pain control.  Remainder of care to be dictated by oncoming provider.  Patient and family are agreeable [DN]   2322 I reviewed cardiology office visit from 2/19/2025.  Patient was seen in follow-up for PFO.  She has a history of left frontal parietal stroke.  She had PFO closure on 12/13/2024. [JR]   2331 CT abdomen pelvis independently interpreted PACS.  There appears to be a left hip effusion. [JR]   Wed Jun 11, 2025   0050 Discussed with Dr. Aly, orthopedic surgery, who agreed with plan to hold antibiotic treatment until the hip can be aspirated in IR tomorrow.  He also requested an MRI of the left hip without contrast.  He will consult. [JR]   0117 Discussed with ANABELA Mendosa for [facility], who agrees to admit on behalf of Dr. Jennings [JR]      ED Course User Index  [DN] Malik Díaz MD  [JR] Antolin Blunt MD             AS OF 01:18 EDT VITALS:    BP - 135/66  HR - 78  TEMP - 99.9 °F (37.7 °C)  O2 SATS - 95%    COMPLEXITY OF CARE  The patient requires admission.      Chronic or social conditions impacting patient care (Social Determinants of Health):     DIAGNOSIS  Final diagnoses:   Acute hip pain, left   Fever, unspecified fever cause   Effusion of left hip           DISPOSITION  Admit      Prescription drug monitoring program review:           Please note that portions of this document were completed with a voice recognition program.    Note Disclaimer: At [facility], we believe that sharing information builds trust and better relationships. You are receiving this note because you recently  visited [facility]. It is possible you will see health information before a provider has talked with you about it. This kind of information can be easy to misunderstand. To help you fully understand what it means for your health, we urge you to discuss this note with your provider.         Antolin Blunt MD  06/11/25 0118

## 2025-06-11 NOTE — PLAN OF CARE
Goal Outcome Evaluation:   [x]  Stable   []  Watcher  []  Unstable  Reason for admission: Left hip pain and fever since Thursday of last week, nausea and abdominal pain  Significant PMH: HTN, Hyperlipidemia, PFO, Loop recorder   Significant 24 hour events: Fluid aspiration this morning with concern for infection-- ID consulted. S/p I&D of L hip infection this afternoon.  Significant Assessment Findings: See results from aspiration, started on vancomycin and rocephin  Social:  at bedside  Additional Info:   Mobility Plan: Assist x1 to bathroom  D/C plans pending post op progress.

## 2025-06-11 NOTE — OP NOTE
Operative Report        Facility: [facility]  Patient Name: Linda Castano  YOB: 1970  Date: 6/11/2025  Medical Record Number: [id number]       Preoperative diagnosis: Left septic hip     Postoperative diagnosis: Left septic hip     Surgery performed: Left hip irrigation debridement    Surgical Approach: Hip Direct Anterior (Smith-Marrufo)      Implants:    Implant Name Type Inv. Item Serial No.  Lot No. LRB No. Used Action   DEV CONTRL TISS STRATAFIXSPIRALMNCRYL [id number] REV3/0 45CM - [id number] Implant DEV CONTRL TISS STRATAFIXSPIRALMNCRYL PLSPS2 REV3/0 45CM  [employer]  DIV [employer] J [employer] J [id number] Left 1 Implanted   DEV WND/CLS CONTRL TISS STRATAFIX SPIRAL PDS PLS CT1 0 30CM - [id number] Implant DEV WND/CLS CONTRL TISS STRATAFIX SPIRAL PDS PLS CT1 0 30CM  [employer]  DIV [employer] J [employer] J [id number] Left 1 Implanted           Surgeon: Ammon Aly MD      Assistant: Kelly Pickett     Assistants were needed for the procedure to help with patient positioning, prepping and draping, retraction throughout the procedure and closure at the end of the case. Their assistance was vital to the success of this case.      Anesthesia: General     Estimated blood loss: 100 mL     Drains: None     Complications: None    Specimens: None       Indications for procedure: This is a pleasant 54-year-old female who presented with acute onset of left hip pain after imaging and aspiration was performed findings were suggestive of septic hip joint.  Open irrigation debridement was discussed and risk and benefits were discussed with the patient she chose to proceed.      Description of procedure:     After identification in the holding area, consent was signed and the left hip was marked.  The patient was brought to the operating theater.  After induction of anesthesia, the patient received preoperative antibiotics and tranexamic acid.  The patient was placed in a [place] table in a supine position and the left lower  extremity was prepped and draped free.  A timeout was performed identifying correct patient, surgical site, surgical procedure, antibiotics given and implants available.  Standard anterior approach to the hip was performed.  I used a clean knife to incise the tensor fascia.  I coagulated the circumflex vessels with the Werewolf.  I retracted the rectus muscle medially coming down to the anterior capsule.  I did not appreciate any infectious process outside of the hip joint however once capsulotomy was made purulent fluid was expressed minimal amount to culture sent evidence of some infectious appearing tissue.  The hip joint was irrigated with normal sterile saline 3 L using irrigation tubing.  Debridement of tissue about the hip joint was performed.  We are able to pull subtraction and rotate the hip to ensure circumferential flushing out of the joint with another 3 L of normal sterile saline and using pulse lavage.  After this dirty instruments were moved off new drape was placed above gloves were changed.  We then closed the capsule with PDS suture deep 10 Indian drain was placed.  We again irrigated with additional saline and small amount of Irrisept.  We then closed the fascial layer.  Irrisept and amounts of saline were then used again.  Subcu was closed as well the skin with Exofin fusion on the incision.   Sterile dressings were applied prior to drapes being removed.   All sponge, needles, and instrument counts were correct at the conclusion of the procedure. The patient tolerated procedure well and was transferred from the operating room to the PACU vital signs stable.

## 2025-06-12 LAB
ANION GAP SERPL CALCULATED.3IONS-SCNC: 9.2 MMOL/L (ref 5–15)
BUN SERPL-MCNC: 13 MG/DL (ref 6–20)
BUN/CREAT SERPL: 17.6 (ref 7–25)
CALCIUM SPEC-SCNC: 8.2 MG/DL (ref 8.6–10.5)
CHLORIDE SERPL-SCNC: 108 MMOL/L (ref 98–107)
CO2 SERPL-SCNC: 20.8 MMOL/L (ref 22–29)
CREAT SERPL-MCNC: 0.74 MG/DL (ref 0.57–1)
CREAT SERPL-MCNC: 0.77 MG/DL (ref 0.57–1)
EGFRCR SERPLBLD CKD-EPI 2021: 91.8 ML/MIN/1.73
EGFRCR SERPLBLD CKD-EPI 2021: 96.3 ML/MIN/1.73
GLUCOSE SERPL-MCNC: 180 MG/DL (ref 65–99)
HCT VFR BLD AUTO: 29.6 % (ref 34–46.6)
HGB BLD-MCNC: 9.6 G/DL (ref 12–15.9)
POTASSIUM SERPL-SCNC: 4.4 MMOL/L (ref 3.5–5.2)
SODIUM SERPL-SCNC: 138 MMOL/L (ref 136–145)
VANCOMYCIN TROUGH SERPL-MCNC: 11.2 MCG/ML (ref 5–20)

## 2025-06-12 PROCEDURE — 85014 HEMATOCRIT: CPT | Performed by: ORTHOPAEDIC SURGERY

## 2025-06-12 PROCEDURE — 85018 HEMOGLOBIN: CPT | Performed by: ORTHOPAEDIC SURGERY

## 2025-06-12 PROCEDURE — 99024 POSTOP FOLLOW-UP VISIT: CPT | Performed by: ORTHOPAEDIC SURGERY

## 2025-06-12 PROCEDURE — 82565 ASSAY OF CREATININE: CPT | Performed by: ORTHOPAEDIC SURGERY

## 2025-06-12 PROCEDURE — 97530 THERAPEUTIC ACTIVITIES: CPT

## 2025-06-12 PROCEDURE — 25010000002 CEFTRIAXONE PER 250 MG: Performed by: ORTHOPAEDIC SURGERY

## 2025-06-12 PROCEDURE — 97161 PT EVAL LOW COMPLEX 20 MIN: CPT

## 2025-06-12 PROCEDURE — 99232 SBSQ HOSP IP/OBS MODERATE 35: CPT | Performed by: STUDENT IN AN ORGANIZED HEALTH CARE EDUCATION/TRAINING PROGRAM

## 2025-06-12 PROCEDURE — 80202 ASSAY OF VANCOMYCIN: CPT | Performed by: ORTHOPAEDIC SURGERY

## 2025-06-12 PROCEDURE — 25010000002 VANCOMYCIN 750 MG RECONSTITUTED SOLUTION 1 EACH VIAL: Performed by: ORTHOPAEDIC SURGERY

## 2025-06-12 PROCEDURE — 25810000003 SODIUM CHLORIDE 0.9 % SOLUTION 250 ML FLEX CONT: Performed by: ORTHOPAEDIC SURGERY

## 2025-06-12 PROCEDURE — 80048 BASIC METABOLIC PNL TOTAL CA: CPT | Performed by: HOSPITALIST

## 2025-06-12 RX ORDER — PANTOPRAZOLE SODIUM 40 MG/1
40 TABLET, DELAYED RELEASE ORAL
Status: DISCONTINUED | OUTPATIENT
Start: 2025-06-12 | End: 2025-06-13

## 2025-06-12 RX ADMIN — VANCOMYCIN HYDROCHLORIDE 750 MG: 750 INJECTION, POWDER, LYOPHILIZED, FOR SOLUTION INTRAVENOUS at 06:28

## 2025-06-12 RX ADMIN — Medication 10 ML: at 20:30

## 2025-06-12 RX ADMIN — Medication 10 ML: at 10:20

## 2025-06-12 RX ADMIN — DOCUSATE SODIUM 100 MG: 100 CAPSULE, LIQUID FILLED ORAL at 10:18

## 2025-06-12 RX ADMIN — HYDROCODONE BITARTRATE AND ACETAMINOPHEN 1 TABLET: 5; 325 TABLET ORAL at 22:35

## 2025-06-12 RX ADMIN — ASPIRIN 81 MG: 81 TABLET, COATED ORAL at 20:29

## 2025-06-12 RX ADMIN — CEFTRIAXONE 2000 MG: 2 INJECTION, POWDER, FOR SOLUTION INTRAMUSCULAR; INTRAVENOUS at 11:36

## 2025-06-12 RX ADMIN — HYDROCODONE BITARTRATE AND ACETAMINOPHEN 1 TABLET: 5; 325 TABLET ORAL at 06:10

## 2025-06-12 RX ADMIN — PANTOPRAZOLE SODIUM 40 MG: 40 TABLET, DELAYED RELEASE ORAL at 10:29

## 2025-06-12 RX ADMIN — VANCOMYCIN HYDROCHLORIDE 750 MG: 750 INJECTION, POWDER, LYOPHILIZED, FOR SOLUTION INTRAVENOUS at 16:43

## 2025-06-12 RX ADMIN — MELOXICAM 15 MG: 15 TABLET ORAL at 10:18

## 2025-06-12 RX ADMIN — ASPIRIN 81 MG: 81 TABLET, COATED ORAL at 10:18

## 2025-06-12 RX ADMIN — POLYETHYLENE GLYCOL 3350 17 G: 17 POWDER, FOR SOLUTION ORAL at 10:18

## 2025-06-12 NOTE — PROGRESS NOTES
LOS: 1 day     Chief Complaint: Septic arthritis of the left hip    Interval History: Patient reports she is doing well this morning.  States she is eager to work with physical therapy and get up out of bed.  Pain is improving.  Tolerating antibiotics.    Vital Signs  Temp:  [97.2 °F (36.2 °C)-99.1 °F (37.3 °C)] 97.9 °F (36.6 °C)  Heart Rate:  [63-84] 71  Resp:  [16-20] 16  BP: (106-133)/(50-80) 106/57    Physical Exam:  General: In no acute distress  HEENT: Oropharynx clear, moist mucous membranes  Respiratory: Normal work of breathing  Skin: No rashes or lesions in exposed areas  Extremities: No edema, cyanosis  Access: Peripheral IV.    Antibiotics:  Anti-Infectives (From admission, onward)      Ordered     Dose/Rate Route Frequency Start Stop    06/11/25 1549  vancomycin 750 mg in sodium chloride 0.9 % 250 mL IVPB-VTB        Ordering Provider: Ammon Aly MD    750 mg  333.3 mL/hr over 45 Minutes Intravenous Every 8 Hours 06/11/25 2300 06/18/25 1459    06/11/25 1458  ceFAZolin 2000 mg IVPB in 100 mL NS (MBP)  Status:  Discontinued        Ordering Provider: Ammon Aly MD    2 g  over 30 Minutes Intravenous Once 06/11/25 1459 06/11/25 1540    06/11/25 1045  vancomycin IVPB 2000 mg in 0.9% Sodium Chloride 500 mL        Ordering Provider: Nik Galvin MD    20 mg/kg × 94.3 kg  250 mL/hr over 120 Minutes Intravenous Once 06/11/25 1200 06/11/25 1504    06/11/25 1040  cefTRIAXone (ROCEPHIN) 2,000 mg in sodium chloride 0.9 % 100 mL MBP        Ordering Provider: Ammon Aly MD    2,000 mg  200 mL/hr over 30 Minutes Intravenous Every 24 Hours 06/11/25 1130 06/18/25 1129    06/11/25 1027  cefTRIAXone (ROCEPHIN) 2,000 mg in sodium chloride 0.9 % 100 mL MBP  Status:  Discontinued        Ordering Provider: Nik Galvin MD    2,000 mg  200 mL/hr over 30 Minutes Intravenous Every 24 Hours 06/11/25 1115 06/11/25 1031    06/11/25 1040  Pharmacy to dose vancomycin        Ordering Provider: Sheeba  "MD Ammon     Not Applicable Continuous PRN 06/11/25 1040 06/18/25 1039    06/11/25 1027  Pharmacy to dose vancomycin  Status:  Discontinued        Ordering Provider: Nik Galvin MD     Not Applicable Continuous PRN 06/11/25 1027 06/11/25 1031             Results Review:     I reviewed the patient's new clinical results.    Lab Results   Component Value Date    WBC 10.60 06/11/2025    HGB 9.6 (L) 06/12/2025    HCT 29.6 (L) 06/12/2025    MCV 90.1 06/11/2025     06/11/2025     Lab Results   Component Value Date    GLUCOSE 95 06/11/2025    BUN 12.0 06/11/2025    CREATININE 0.77 06/12/2025    EGFRIFAFRI >60 12/15/2020    BCR 16.7 06/11/2025    CO2 24.0 06/11/2025    CALCIUM 8.5 (L) 06/11/2025    ALBUMIN 4.0 06/10/2025    LABIL2 1.3 12/15/2020    AST 27 06/10/2025    ALT 37 (H) 06/10/2025       No results found for: \"VANCOPEAK\", \"VANCOTROUGH\", \"VANCORANDOM\"     Microbiology:  Microbiology Results (last 10 days)       Procedure Component Value - Date/Time    Tissue / Bone Culture - Tissue, Hip, Left [[id number]] Collected: 06/11/25 1550    Lab Status: Preliminary result Specimen: Tissue from Hip, Left Updated: 06/12/25 0724     Tissue Culture No growth     Gram Stain Moderate (3+) WBCs seen      No organisms seen    Wound Culture - Surgical Site, Hip, Left [[id number]] Collected: 06/11/25 1549    Lab Status: Preliminary result Specimen: Surgical Site from Hip, Left Updated: 06/12/25 0724     Wound Culture No growth     Gram Stain Few (2+) WBCs seen      No organisms seen    Wound Culture - Surgical Site, Hip, Left [[id number]] Collected: 06/11/25 1547    Lab Status: Preliminary result Specimen: Surgical Site from Hip, Left Updated: 06/12/25 0724     Wound Culture No growth     Gram Stain Moderate (3+) WBCs seen      No organisms seen    Body Fluid Culture - Aspirate, Hip, Left [[id number]] Collected: 06/11/25 0856    Lab Status: Preliminary result Specimen: Aspirate from Hip, Left Updated: 06/12/25 0723     " Body Fluid Culture No growth     Gram Stain Rare (1+) WBCs seen      No organisms seen    Blood Culture - Blood, Arm, Right [[id number]]  (Normal) Collected: 06/10/25 2359    Lab Status: Preliminary result Specimen: Blood from Arm, Right Updated: 06/12/25 0015     Blood Culture No growth at 24 hours    Blood Culture - Blood, Arm, Left [[id number]]  (Normal) Collected: 06/10/25 2353    Lab Status: Preliminary result Specimen: Blood from Arm, Left Updated: 06/12/25 0015     Blood Culture No growth at 24 hours               Isolation:   No active isolations    Assessment      #Left hip negative septic arthritis    Status post irrigation debridement yesterday with surgical cultures obtained.  Will follow-up hip aspirate culture and operative cultures for de-escalation of antibiotics.  Appreciate orthopedics assistance.    Continue ceftriaxone 2 g daily and vancomycin goal -600.  Appreciate pharmacy's help with dosing.  Likely will need a 4-week course.    ID will follow.   -----------------------------------------------------------------------------------------------  The above decisions regarding prescribing and/or discontinuation of antimicrobials incorporate elements of engaging in complex medical decision-making associated with antimicrobial therapy including consideration of patient-specific resistance patterns, consideration of local resistance patterns, the potential to develop resistant strains or new infections while on antimicrobial therapy, patient's recent antibiotic exposure or lack thereof, interactions between antibiotics and other medications, consideration of patient's other co-morbidities in prescribing antibiotic therapy, public health considerations such as risk of transmission of infection or lack thereof, and public health considerations to minimize development of antimicrobial resistance in the community.     The patient was provided education re: his or her specific antimicrobial selection  including indications, benefits, and risks.

## 2025-06-12 NOTE — PROGRESS NOTES
Orthopedic Progress Note      Patient: Linda Castano  Date of Admission: 6/10/2025  YOB: 1970  Medical Record Number: [id number]    POD # :  1 Day Post-Op Procedure(s) (LRB):  HIP INCISION AND DRAINAGE WITH HANA TABLE (Left)    Patient seen and evaluated this afternoon she was sitting comfortably in bed reports her hip pain is much improved.    Physical Exam:  54 y.o.  female  Vitals:  Temp:  [97.5 °F (36.4 °C)-98.6 °F (37 °C)] 98.6 °F (37 °C)  Heart Rate:  [63-79] 79  Resp:  [16-18] 16  BP: (106-133)/(57-80) 128/64  alert and oriented    Ext: NV intact. ROM appropriate. Calf is soft and nontender. Negative Homans Sn.  2+ pedal pulses  Skin: Incision clean dry and intact w/out signs or  symptoms of infection.    Activity: Mobilizing Per P.T.   Weight Bearing: As Tolerated    Data Review     Admission on 06/10/2025   Component Date Value Ref Range Status    Glucose 06/10/2025 107 (H)  65 - 99 mg/dL Final    BUN 06/10/2025 14.0  6.0 - 20.0 mg/dL Final    Creatinine 06/10/2025 0.71  0.57 - 1.00 mg/dL Final    Sodium 06/10/2025 137  136 - 145 mmol/L Final    Potassium 06/10/2025 4.7  3.5 - 5.2 mmol/L Final    Chloride 06/10/2025 104  98 - 107 mmol/L Final    CO2 06/10/2025 22.0  22.0 - 29.0 mmol/L Final    Calcium 06/10/2025 9.1  8.6 - 10.5 mg/dL Final    Total Protein 06/10/2025 7.2  6.0 - 8.5 g/dL Final    Albumin 06/10/2025 4.0  3.5 - 5.2 g/dL Final    ALT (SGPT) 06/10/2025 37 (H)  1 - 33 U/L Final    AST (SGOT) 06/10/2025 27  1 - 32 U/L Final    Alkaline Phosphatase 06/10/2025 133 (H)  39 - 117 U/L Final    Total Bilirubin 06/10/2025 0.4  0.0 - 1.2 mg/dL Final    Globulin 06/10/2025 3.2  gm/dL Final    A/G Ratio 06/10/2025 1.3  g/dL Final    BUN/Creatinine Ratio 06/10/2025 19.7  7.0 - 25.0 Final    Anion Gap 06/10/2025 11.0  5.0 - 15.0 mmol/L Final    eGFR 06/10/2025 101.2  >60.0 mL/min/1.73 Final    Color, UA 06/10/2025 Yellow  Yellow, Straw Final    Appearance, UA 06/10/2025 Clear  Clear Final     pH, UA 06/10/2025 5.5  5.0 - 8.0 Final    Specific Gravity, UA 06/10/2025 1.019  1.005 - 1.030 Final    Glucose, UA 06/10/2025 Negative  Negative Final    Ketones, UA 06/10/2025 15 mg/dL (1+) (A)  Negative Final    Bilirubin, UA 06/10/2025 Negative  Negative Final    Blood, UA 06/10/2025 Negative  Negative Final    Protein, UA 06/10/2025 Negative  Negative Final    Leuk Esterase, UA 06/10/2025 Small (1+) (A)  Negative Final    Nitrite, UA 06/10/2025 Negative  Negative Final    Urobilinogen, UA 06/10/2025 0.2 E.U./dL  0.2 - 1.0 E.U./dL Final    WBC 06/10/2025 10.56  3.40 - 10.80 10*3/mm3 Final    RBC 06/10/2025 3.91  3.77 - 5.28 10*6/mm3 Final    Hemoglobin 06/10/2025 11.2 (L)  12.0 - 15.9 g/dL Final    Hematocrit 06/10/2025 33.7 (L)  34.0 - 46.6 % Final    MCV 06/10/2025 86.2  79.0 - 97.0 fL Final    MCH 06/10/2025 28.6  26.6 - 33.0 pg Final    MCHC 06/10/2025 33.2  31.5 - 35.7 g/dL Final    RDW 06/10/2025 13.2  12.3 - 15.4 % Final    RDW-SD 06/10/2025 41.3  37.0 - 54.0 fl Final    MPV 06/10/2025 9.4  6.0 - 12.0 fL Final    Platelets 06/10/2025 305  140 - 450 10*3/mm3 Final    Neutrophil % 06/10/2025 77.4 (H)  42.7 - 76.0 % Final    Lymphocyte % 06/10/2025 13.4 (L)  19.6 - 45.3 % Final    Monocyte % 06/10/2025 7.9  5.0 - 12.0 % Final    Eosinophil % 06/10/2025 0.5  0.3 - 6.2 % Final    Basophil % 06/10/2025 0.3  0.0 - 1.5 % Final    Immature Grans % 06/10/2025 0.5  0.0 - 0.5 % Final    Neutrophils, Absolute 06/10/2025 8.18 (H)  1.70 - 7.00 10*3/mm3 Final    Lymphocytes, Absolute 06/10/2025 1.42  0.70 - 3.10 10*3/mm3 Final    Monocytes, Absolute 06/10/2025 0.83  0.10 - 0.90 10*3/mm3 Final    Eosinophils, Absolute 06/10/2025 0.05  0.00 - 0.40 10*3/mm3 Final    Basophils, Absolute 06/10/2025 0.03  0.00 - 0.20 10*3/mm3 Final    Immature Grans, Absolute 06/10/2025 0.05  0.00 - 0.05 10*3/mm3 Final    nRBC 06/10/2025 0.0  0.0 - 0.2 /100 WBC Final    Lipase 06/10/2025 20  13 - 60 U/L Final    Sed Rate 06/10/2025 43 (H)  0 -  30 mm/hr Final    C-Reactive Protein 06/10/2025 6.89 (H)  0.00 - 0.50 mg/dL Final    Blood Culture 06/10/2025 No growth at 24 hours   Preliminary    Blood Culture 06/10/2025 No growth at 24 hours   Preliminary    Lactate 06/10/2025 0.8  0.5 - 2.0 mmol/L Final    RBC, UA 06/10/2025 0-2  None Seen, 0-2 /HPF Final    WBC, UA 06/10/2025 3-5 (A)  None Seen, 0-2 /HPF Final    Urine culture not indicated.    Bacteria, UA 06/10/2025 None Seen  None Seen /HPF Final    Squamous Epithelial Cells, UA 06/10/2025 0-2  None Seen, 0-2 /HPF Final    Hyaline Casts, UA 06/10/2025 None Seen  None Seen /LPF Final    Methodology 06/10/2025 Automated Microscopy   Final    Glucose 06/11/2025 95  65 - 99 mg/dL Final    BUN 06/11/2025 12.0  6.0 - 20.0 mg/dL Final    Creatinine 06/11/2025 0.72  0.57 - 1.00 mg/dL Final    Sodium 06/11/2025 138  136 - 145 mmol/L Final    Potassium 06/11/2025 4.1  3.5 - 5.2 mmol/L Final    Chloride 06/11/2025 107  98 - 107 mmol/L Final    CO2 06/11/2025 24.0  22.0 - 29.0 mmol/L Final    Calcium 06/11/2025 8.5 (L)  8.6 - 10.5 mg/dL Final    BUN/Creatinine Ratio 06/11/2025 16.7  7.0 - 25.0 Final    Anion Gap 06/11/2025 7.0  5.0 - 15.0 mmol/L Final    eGFR 06/11/2025 99.5  >60.0 mL/min/1.73 Final    WBC 06/11/2025 10.60  3.40 - 10.80 10*3/mm3 Final    RBC 06/11/2025 3.55 (L)  3.77 - 5.28 10*6/mm3 Final    Hemoglobin 06/11/2025 10.3 (L)  12.0 - 15.9 g/dL Final    Hematocrit 06/11/2025 32.0 (L)  34.0 - 46.6 % Final    MCV 06/11/2025 90.1  79.0 - 97.0 fL Final    MCH 06/11/2025 29.0  26.6 - 33.0 pg Final    MCHC 06/11/2025 32.2  31.5 - 35.7 g/dL Final    RDW 06/11/2025 13.6  12.3 - 15.4 % Final    RDW-SD 06/11/2025 44.6  37.0 - 54.0 fl Final    MPV 06/11/2025 9.5  6.0 - 12.0 fL Final    Platelets 06/11/2025 262  140 - 450 10*3/mm3 Final    Body Fluid Culture 06/11/2025 No growth   Preliminary    Gram Stain 06/11/2025 Rare (1+) WBCs seen   Preliminary    Gram Stain 06/11/2025 No organisms seen   Preliminary     Crystals, Fluid 06/11/2025 No crystals seen   Final    Color, Fluid 06/11/2025 Red   Final    Appearance, Fluid 06/11/2025 Cloudy (A)  Clear Final    RBC, Fluid 06/11/2025 40,000  /mm3 Final    Nucleated Cells, Fluid 06/11/2025 58,720  /mm3 Final    Method: 06/11/2025 Automated Sysmex XN Method   Final    Neutrophils, Fluid % 06/11/2025 89  % Final    Lymphocytes, Fluid % 06/11/2025 8  % Final    Monocytes, Fluid % 06/11/2025 3  % Final    C-Reactive Protein 06/11/2025 5.78 (H)  0.00 - 0.50 mg/dL Final    Wound Culture 06/11/2025 No growth   Preliminary    Gram Stain 06/11/2025 Moderate (3+) WBCs seen   Preliminary    Gram Stain 06/11/2025 No organisms seen   Preliminary    Wound Culture 06/11/2025 No growth   Preliminary    Gram Stain 06/11/2025 Few (2+) WBCs seen   Preliminary    Gram Stain 06/11/2025 No organisms seen   Preliminary    Tissue Culture 06/11/2025 No growth   Preliminary    Gram Stain 06/11/2025 Moderate (3+) WBCs seen   Preliminary    Gram Stain 06/11/2025 No organisms seen   Preliminary    Creatinine 06/12/2025 0.77  0.57 - 1.00 mg/dL Final    eGFR 06/12/2025 91.8  >60.0 mL/min/1.73 Final    Hemoglobin 06/12/2025 9.6 (L)  12.0 - 15.9 g/dL Final    Hematocrit 06/12/2025 29.6 (L)  34.0 - 46.6 % Final    Vancomycin Trough 06/12/2025 11.20  5.00 - 20.00 mcg/mL Final    Glucose 06/12/2025 180 (H)  65 - 99 mg/dL Final    BUN 06/12/2025 13.0  6.0 - 20.0 mg/dL Final    Creatinine 06/12/2025 0.74  0.57 - 1.00 mg/dL Final    Sodium 06/12/2025 138  136 - 145 mmol/L Final    Potassium 06/12/2025 4.4  3.5 - 5.2 mmol/L Final    Chloride 06/12/2025 108 (H)  98 - 107 mmol/L Final    CO2 06/12/2025 20.8 (L)  22.0 - 29.0 mmol/L Final    Calcium 06/12/2025 8.2 (L)  8.6 - 10.5 mg/dL Final    BUN/Creatinine Ratio 06/12/2025 17.6  7.0 - 25.0 Final    Anion Gap 06/12/2025 9.2  5.0 - 15.0 mmol/L Final    eGFR 06/12/2025 96.3  >60.0 mL/min/1.73 Final       FL Guided Aspiration Joint  Result Date: 6/11/2025  Narrative: LEFT  HIP FLUOROSCOPICALLY GUIDED JOINT ASPIRATION, 6/11/2025  HISTORY: 54-year-old female with left hip pain. MRI demonstrates moderate to large left hip effusion. Rule out septic joint.  CONSENT: Procedure, risks and alternatives were discussed in detail with the patient, including the low risk of allergy and infection; patient questions were answered, and informed consent was obtained. Timeout was observed in the procedure room for patient identification and procedure site verification, and the site was marked by me.  PROCEDURE: *  Reference Air Kerma: 2.4 mGy  Using sterile technique, 1% lidocaine local anesthetic and fluoroscopic guidance, a 22-gauge spinal needle was placed into the right hip joint capsule without difficulty. Aspiration of approximately 12 mL of cloudy viscous yellow/orange-colored fluid. The needle was removed and manual pressure was applied achieving adequate hemostasis. A Band-Aid was applied. Specimen was sent to laboratory as ordered by the requesting provider. The patient tolerated the procedure well without incident.       Impression: Technically successful left hip joint aspiration with removal of approximately 12 mL of cloudy viscous yellow/orange-colored fluid.   Procedure performed by ANABELA Blake. By electronically signing this report, I, the supervising radiologist, attest that I was not present for the procedure(s) but agree with the final edited report.  This report was finalized on 6/11/2025 10:55 AM by Dr. Chandra Christie M.D on Workstation: [id number]      MRI Hip Left Without Contrast  Result Date: 6/11/2025  Narrative: MRI HIP LEFT WO CONTRAST-  6/11/2025 4:00 AM  INDICATION: acute left hip pain, suspect effusion on CT, possible septic arthritis.  COMPARISON: CT abdomen pelvis 6/10/2025.  TECHNIQUE: Multiplanar, multisequence MR imaging of the pelvis and hip was performed without the intravenous administration of contrast.  FINDINGS: SOFT TISSUES: Mild patchy soft tissue  edema anterior and lateral to the proximal left femur and in the left adductor compartment. No cystic or solid soft tissue mass. No abnormal bursal fluid collection. No pathologically enlarged lymph nodes. No well-formed or drainable fluid collection to suggest abscess. The sciatic nerves are unremarkable as imaged.  BONES: Tiny likely degenerative subchondral cystic changes at the anterior/superior left acetabulum. No acute fracture. No concerning bone marrow lesion or marrow replacing process.  JOINTS: Moderate to large left hip joint effusion. Mild left hip joint chondromalacia with full-thickness or near full-thickness chondral fissuring at the anterior/superior left acetabulum. The articular cartilage in the contralateral (right) hip joint space is fairly well maintained on the wide field-of-view sequences. Full-thickness or near full-thickness tear of the left ligamentum teres. Mild DJD at the pubic symphysis. Mild bilateral SI joint DJD. Partially imaged lumbosacral spondylosis.  LABRUM: Likely degenerative tear of the anterior/superior left acetabular labrum. Tiny 0.5 cm associated anterior/superior left paralabral cyst.  MUSCLES AND TENDONS: Mild edema throughout the left adductor muscles and in the left tensor fascia ana muscle. The anterior muscles and tendons are otherwise intact. The gluteal tendons are intact. Significant muscular fatty atrophy. The proximal hamstring tendons are intact.      Impression:  1. Nonspecific moderate to large left hip joint effusion with mild soft tissue edema along the anterior and lateral proximal left femur. Correlate with ESR and CRP and possible joint fluid analysis if there is clinical concern for septic arthritis. 2. Full-thickness or near full-thickness tear of the left ligamentum teres. 3. Mild edema in the left abductor muscles in the left abductor compartment, which could represent mild muscle strain or nonspecific myositis.   This report was finalized on  6/11/2025 7:31 AM by Oleksandr Wyatt MD on Workstation: [id number]      CT Abdomen Pelvis With Contrast  Addendum Date: 6/11/2025  Addendum: Patient does have a left hip effusion. No aggressive osseous abnormalities are seen.  This report was finalized on 6/11/2025 12:58 AM by Dr. Evelyn Carlos M.D on Workstation: [id number]      Result Date: 6/11/2025  Narrative: CT OF THE ABDOMEN PELVIS WITH CONTRAST  HISTORY: Left flank pain and fever  COMPARISON: None available.  TECHNIQUE: Axial CT imaging was obtained through the abdomen and pelvis. IV contrast was administered.  FINDINGS: The patient is status post closure of a patent foramen ovale. Images through the lung bases are degraded by motion artifact. There is a potential nodule within the left lower lobe, measuring 5 mm. No suspicious hepatic lesions are seen. The stomach, adrenal glands, spleen, pancreas, and gallbladder all appear normal there is a duodenal diverticulum.. The kidneys enhance symmetrically. There is no hydronephrosis. There are bilateral parapelvic cysts. Uterus appears to normal. No adnexal masses are seen. No distal ureteral or bladder stones are seen. There is colonic diverticulosis. There is no bowel obstruction. The appendix is normal. No acute osseous abnormalities are seen.      Impression: 1. No acute intra-abdominal or intrapelvic process seen.  2. Indeterminate solid pulmonary nodule measuring 5 mm. In a low-risk patient with a solid nodule <6 mm, recommend no follow-up. In a high-risk patient, CT at 12 months is optional with stronger consideration if there is suspicious nodule morphology and/or upper lobe location.  Radiation dose reduction techniques were utilized, including automated exposure control and exposure modulation based on body size.   This report was finalized on 6/11/2025 12:12 AM by Dr. Evelyn Carlos M.D on Workstation: [id number]        Medications:  aspirin, 81 mg, Oral, Q12H  cefTRIAXone, 2,000 mg,  Intravenous, Q24H  docusate sodium, 100 mg, Oral, BID  meloxicam, 15 mg, Oral, Daily  pantoprazole, 40 mg, Oral, Q AM  polyethylene glycol, 17 g, Oral, BID  sodium chloride, 10 mL, Intravenous, Q12H  [START ON 6/13/2025] vancomycin, 1,250 mg, Intravenous, Q12H        acetaminophen **OR** acetaminophen **OR** acetaminophen    acetaminophen    senna-docusate sodium **AND** polyethylene glycol **AND** bisacodyl **AND** bisacodyl    bisacodyl    calcium carbonate    HYDROcodone-acetaminophen    ketorolac    magnesium hydroxide    Morphine    ondansetron ODT **OR** ondansetron    ondansetron ODT **OR** ondansetron    Pharmacy to dose vancomycin    sodium chloride    sodium chloride            Assessment:  Doing well POD  # 1 Day Post-Op Procedure(s) (LRB):  HIP INCISION AND DRAINAGE WITH HANA TABLE (Left)  Problems Addressed this Visit          Musculoskeletal and Injuries    * (Principal) Acute hip pain, left       Other    Septic hip - Primary    Relevant Medications    cefTRIAXone (ROCEPHIN) 2,000 mg in sodium chloride 0.9 % 100 mL MBP    Other Relevant Orders    Case Request (Completed)    Anaerobic Culture - Surgical Site, Hip, Left    Anaerobic Culture - Surgical Site, Hip, Left    Wound Culture - Surgical Site, Hip, Left (Completed)    Wound Culture - Surgical Site, Hip, Left (Completed)    Anaerobic Culture - Tissue, Hip, Left    Tissue / Bone Culture - Tissue, Hip, Left (Completed)     Other Visit Diagnoses         Fever, unspecified fever cause          Effusion of left hip              Diagnoses         Codes Comments      Septic hip    -  Primary ICD-10-CM: M00.9  ICD-9-CM: 711.05       Acute hip pain, left     ICD-10-CM: M25.552  ICD-9-CM: 719.45       Fever, unspecified fever cause     ICD-10-CM: R50.9  ICD-9-CM: 780.60       Effusion of left hip     ICD-10-CM: M25.452  ICD-9-CM: 719.05             Plan:  Follow-up cultures and antibiotics per ID recommendations  Continue efforts to mobilize  Continue Pain  Control Measures  Continue incisional Care  DVT prophylaxis    Discussed removing drain with nursing today.      Patient will need to follow-up in 2 weeks.  Orthopedics discharge instructions will be placed.  Please call questions or concerns.    Ammon Aly MD    Date: 6/12/2025  Time: 17:26 EDT

## 2025-06-12 NOTE — PLAN OF CARE
Goal Outcome Evaluation:  Plan of Care Reviewed With: patient, spouse           Outcome Evaluation: Patient is a 54 y.o female POD1 L hip I&D. Patient AOx4 supine in bed upon arrival. Spouse at bedside. Patient reports she is independent at baseline with no AD. Today patient completed all bed mobility with SBA. Patient stood from EOB and ambulated 450ft around unit with rwx and SBA. Gait slow but mostly steady with no overt LOB noted. Encouraged patient to continue ambulating in hallways with nsg/family several times per day. No further skilled acute PT needs identified. PT will sign off.    Anticipated Discharge Disposition (PT): home with assist, home with outpatient therapy services

## 2025-06-12 NOTE — PROGRESS NOTES
[facility]     LOS: 1 day     Subjective:    CC:Hip Pain and Fever    DIET:  Diet Order   Procedures    Diet: Regular/House; Texture: Regular (IDDSI 7); Fluid Consistency: Thin (IDDSI 0)       Objective:    Vital Signs:  Temp:  [97.5 °F (36.4 °C)-99.1 °F (37.3 °C)] 97.9 °F (36.6 °C)  Heart Rate:  [63-84] 74  Resp:  [16-20] 16  BP: (106-133)/(57-80) 133/70    SpO2:  [96 %-100 %] 97 %  on  Flow (L/min) (Oxygen Therapy):  [2-4] 2;   Device (Oxygen Therapy): nasal cannula  Body mass index is 38.02 kg/m².    Physical Exam    Results Review:    Glucose   Date Value Ref Range Status   06/12/2025 180 (H) 65 - 99 mg/dL Final   06/11/2025 95 65 - 99 mg/dL Final   06/10/2025 107 (H) 65 - 99 mg/dL Final     Results from last 7 days   Lab Units 06/12/25 0225 06/11/25  0734   WBC 10*3/mm3  --  10.60   HEMOGLOBIN g/dL 9.6* 10.3*   HEMATOCRIT % 29.6* 32.0*   PLATELETS 10*3/mm3  --  262     Results from last 7 days   Lab Units 06/12/25 0225 06/11/25  0734 06/10/25  2243   SODIUM mmol/L 138   < > 137   POTASSIUM mmol/L 4.4   < > 4.7   CHLORIDE mmol/L 108*   < > 104   CO2 mmol/L 20.8*   < > 22.0   BUN mg/dL 13.0   < > 14.0   CREATININE mg/dL 0.74  0.77   < > 0.71   CALCIUM mg/dL 8.2*   < > 9.1   BILIRUBIN mg/dL  --   --  0.4   ALK PHOS U/L  --   --  133*   ALT (SGPT) U/L  --   --  37*   AST (SGOT) U/L  --   --  27   GLUCOSE mg/dL 180*   < > 107*    < > = values in this interval not displayed.                 Cultures:  Blood Culture   Date Value Ref Range Status   06/10/2025 No growth at 24 hours  Preliminary   06/10/2025 No growth at 24 hours  Preliminary     Wound Culture   Date Value Ref Range Status   06/11/2025 No growth  Preliminary   06/11/2025 No growth  Preliminary       I have reviewed daily medications and changes in CPOE    Scheduled meds  aspirin, 81 mg, Oral, Q12H  cefTRIAXone, 2,000 mg, Intravenous, Q24H  docusate sodium, 100 mg, Oral, BID  meloxicam, 15 mg, Oral, Daily  pantoprazole, 40  mg, Oral, Q AM  polyethylene glycol, 17 g, Oral, BID  sodium chloride, 10 mL, Intravenous, Q12H  [START ON 6/13/2025] vancomycin, 1,250 mg, Intravenous, Q12H        lactated ringers, 75 mL/hr, Last Rate: 75 mL/hr (06/11/25 1217)  Pharmacy to dose vancomycin,       PRN meds    acetaminophen **OR** acetaminophen **OR** acetaminophen    acetaminophen    senna-docusate sodium **AND** polyethylene glycol **AND** bisacodyl **AND** bisacodyl    bisacodyl    calcium carbonate    HYDROcodone-acetaminophen    ketorolac    magnesium hydroxide    Morphine    ondansetron ODT **OR** ondansetron    ondansetron ODT **OR** ondansetron    Pharmacy to dose vancomycin    sodium chloride    sodium chloride        Acute hip pain, left    Septic hip        Assessment/Plan:  Concern for left hip infection  Torn ligamentous teres  - Status post I&D in OR of left hip  -Blood cultures drawn and hip aspirate culture obtained  -Surgical culture obtained     Hx pfo in dec 2024 and was taken off Plavix at that time, takes an aspirin daily  - Patient restarted on aspirin  - Discussed with cardiology and no further workup.  Patient needs to continue with the aspirin.  - Echocardiogram in February was unremarkable for any valvular abnormality      Nik Galvin MD  06/12/25  15:49 EDT

## 2025-06-12 NOTE — THERAPY EVALUATION
Patient Name: Linda Castano  : 1970    MRN: [id number]                              Today's Date: 2025       Admit Date: 6/10/2025    Visit Dx:     ICD-10-CM ICD-9-CM   1. Septic hip  M00.9 711.05   2. Acute hip pain, left  M25.552 719.45   3. Fever, unspecified fever cause  R50.9 780.60   4. Effusion of left hip  M25.452 719.05     Patient Active Problem List   Diagnosis    Acute CVA (cerebrovascular accident)    Obesity    PFO with atrial septal aneurysm    Transient elevated blood pressure    Hyperlipidemia    Implantable loop recorder present    S/P patent foramen ovale closure    Acute hip pain, left    Septic hip     Past Medical History:   Diagnosis Date    Hyperlipidemia 24    Hypertension     improved with weight loss    Implantable loop recorder present 2024    Obesity     PFO (patent foramen ovale)     Stroke      Past Surgical History:   Procedure Laterality Date    CARDIAC ELECTROPHYSIOLOGY PROCEDURE N/A 2024    Procedure: Loop insertion  medtronic;  Surgeon: Mahin Crooks MD;  Location:  [facility];  Service: Cardiovascular;  Laterality: N/A;    PATENT FORAMEN OVALE CLOSURE N/A 2024    Procedure: Patent foramen ovale closure ABBOTT;  Surgeon: Mahin Crooks MD;  Location:  [facility];  Service: Cardiology;  Laterality: N/A;      General Information       Row Name 25 1018          Physical Therapy Time and Intention    Document Type evaluation;discharge evaluation/summary  -     Mode of Treatment individual therapy;physical therapy  -       Row Name 25 1018          General Information    Patient Profile Reviewed yes  -SM     Prior Level of Function independent:  -SM     Existing Precautions/Restrictions fall  -SM       Row Name 25 1018          Living Environment    Current Living Arrangements home  -SM     People in Home spouse  -SM       Row Name 25 1018          Home Main Entrance     Number of Stairs, Main Entrance five  -       Row Name 06/12/25 1018          Cognition    Orientation Status (Cognition) oriented x 4  -       Row Name 06/12/25 1018          Safety Issues/Impairments Affecting Functional Mobility    Impairments Affecting Function (Mobility) pain;endurance/activity tolerance  -               User Key  (r) = Recorded By, (t) = Taken By, (c) = Cosigned By      Initials Name Provider Type     Lanie Garcia PT Physical Therapist                   Mobility       Row Name 06/12/25 1018          Bed Mobility    Bed Mobility supine-sit;sit-supine  -     Supine-Sit [place] (Bed Mobility) standby assist  -     Sit-Supine [place] (Bed Mobility) standby assist  -       Row Name 06/12/25 1018          Sit-Stand Transfer    Sit-Stand [place] (Transfers) standby assist;verbal cues  -     Assistive Device (Sit-Stand Transfers) walker, front-wheeled  -       Row Name 06/12/25 1018          Gait/Stairs (Locomotion)    [place] Level (Gait) standby assist  -     Assistive Device (Gait) walker, front-wheeled  -     Distance in Feet (Gait) 450  -     Deviations/Abnormal Patterns (Gait) guru decreased;gait speed decreased  -     Comment, (Gait/Stairs) Gait slow but mostly steady with no overt LOB noted.  -               User Key  (r) = Recorded By, (t) = Taken By, (c) = Cosigned By      Initials Name Provider Type     Lanie Garcia PT Physical Therapist                   Obj/Interventions       Row Name 06/12/25 1019          Range of Motion Comprehensive    General Range of Motion bilateral lower extremity ROM WFL  -       Row Name 06/12/25 1019          Strength Comprehensive (MMT)    General Manual Muscle Testing (MMT) Assessment lower extremity strength deficits identified  -     Comment, General Manual Muscle Testing (MMT) Assessment Generalized weakness  -       Row Name 06/12/25 1019          Balance    Balance Assessment sitting  static balance;sitting dynamic balance;standing static balance;standing dynamic balance  -     Static Sitting Balance supervision  -     Dynamic Sitting Balance supervision  -     Position, Sitting Balance sitting edge of bed  -     Static Standing Balance standby assist  -     Dynamic Standing Balance standby assist  -     Position/Device Used, Standing Balance supported;walker, front-wheeled  -     Balance Interventions sitting;standing;sit to stand;supported;static;dynamic  -               User Key  (r) = Recorded By, (t) = Taken By, (c) = Cosigned By      Initials Name Provider Type     Lanie Garcia, PT Physical Therapist                   Goals/Plan    No documentation.                  Clinical Impression       [facility] Name 06/12/25 1019          Pain    Pain Location hip  -     Pain Side/Orientation left  -     Pain Management Interventions exercise or physical activity utilized  -     Response to Pain Interventions activity participation with tolerable pain  -     Pre/Posttreatment Pain Comment Patient did not rate  -[facility] Name 06/12/25 1019          Plan of Care Review    Plan of Care Reviewed With patient;spouse  -     Outcome Evaluation Patient is a 54 y.o female POD1 L hip I&D. Patient AOx4 supine in bed upon arrival. Spouse at bedside. Patient reports she is independent at baseline with no AD. Today patient completed all bed mobility with SBA. Patient stood from EOB and ambulated 450ft around unit with rwx and SBA. Gait slow but mostly steady with no overt LOB noted. Encouraged patient to continue ambulating in hallways with nsg/family several times per day. No further skilled acute PT needs identified. PT will sign off.  -[facility] Name 06/12/25 1019          Therapy Assessment/Plan (PT)    Criteria for Skilled Interventions Met (PT) no;no problems identified which require skilled intervention  -     Therapy Frequency (PT) evaluation only  -[facility] Name  06/12/25 1019          Vital Signs    Pre Patient Position Supine  -SM     Intra Patient Position Standing  -SM     Post Patient Position Supine  -SM       Row Name 06/12/25 1019          Positioning and Restraints    Pre-Treatment Position in bed  -SM     Post Treatment Position bed  -SM     In Bed notified nsg;call light within reach;encouraged to call for assist;fowlers;with family/caregiver  -               User Key  (r) = Recorded By, (t) = Taken By, (c) = Cosigned By      Initials Name Provider Type     Lanie Garcia PT Physical Therapist                   Outcome Measures       Row Name 06/12/25 1022          How much help from another person do you currently need...    Turning from your back to your side while in flat bed without using bedrails? 4  -SM     Moving from lying on back to sitting on the side of a flat bed without bedrails? 4  -SM     Moving to and from a bed to a chair (including a wheelchair)? 4  -SM     Standing up from a chair using your arms (e.g., wheelchair, bedside chair)? 4  -SM     Climbing 3-5 steps with a railing? 3  -SM     To walk in hospital room? 4  -SM     AM-PAC 6 Clicks Score (PT) 23  -SM     Highest Level of Mobility Goal Walk 25 Feet or More-7  -SM       Row Name 06/12/25 1022          Functional Assessment    Outcome Measure Options AM-PAC 6 Clicks Basic Mobility (PT)  -               User Key  (r) = Recorded By, (t) = Taken By, (c) = Cosigned By      Initials Name Provider Type    Lanie Coleman PT Physical Therapist                                 Physical Therapy Education       Title: PT OT SLP Therapies (Done)       Topic: Physical Therapy (Done)       Point: Mobility training (Done)       Learning Progress Summary            Patient Acceptance, E, VU by  at 6/12/2025 1022                      Point: Home exercise program (Done)       Learning Progress Summary            Patient Acceptance, E, VU by  at 6/12/2025 1022                      Point:  Body mechanics (Done)       Learning Progress Summary            Patient Acceptance, E, VU by  at 6/12/2025 1022                      Point: Precautions (Done)       Learning Progress Summary            Patient Acceptance, E, VU by  at 6/12/2025 1022                                      User Key       Initials Effective Dates Name Provider Type [facility] 05/02/22 -  Lanie Garcia PT Physical Therapist PT                  PT Recommendation and Plan     Outcome Evaluation: Patient is a 54 y.o female POD1 L hip I&D. Patient AOx4 supine in bed upon arrival. Spouse at bedside. Patient reports she is independent at baseline with no AD. Today patient completed all bed mobility with SBA. Patient stood from EOB and ambulated 450ft around unit with rwx and SBA. Gait slow but mostly steady with no overt LOB noted. Encouraged patient to continue ambulating in hallways with nsg/family several times per day. No further skilled acute PT needs identified. PT will sign off.     Time Calculation:         PT Charges       Row Name 06/12/25 1022             Time Calculation    Start Time 0901  -      Stop Time 0917  -      Time Calculation (min) 16 min  -SM      PT Received On 06/12/25  -         Time Calculation- PT    Total Timed Code Minutes- PT 8 minute(s)  -         Timed Charges    31986 - PT Therapeutic Activity Minutes 8  -SM         Total Minutes    Timed Charges Total Minutes 8  -SM       Total Minutes 8  -SM                User Key  (r) = Recorded By, (t) = Taken By, (c) = Cosigned By      Initials Name Provider Type     Lanie Garcia PT Physical Therapist                  Therapy Charges for Today       Code Description Service Date Service Provider Modifiers Qty    35347395763 HC PT THERAPEUTIC ACT EA 15 MIN 6/12/2025 Lanie Garcia, PT GP 1    93122680090 HC PT EVAL LOW COMPLEXITY 3 6/12/2025 Lanie Garcia, PT GP 1            PT G-Codes  Outcome Measure Options: AM-PAC 6 Clicks Basic  Mobility (PT)  AM-PAC 6 Clicks Score (PT): 23  PT Discharge Summary  Anticipated Discharge Disposition (PT): home with assist, home with outpatient therapy services    Lanie Garcia, PT  6/12/2025

## 2025-06-12 NOTE — PROGRESS NOTES
"[facility] Clinical Pharmacy Services: Vancomycin Pharmacokinetic Consult Note    Linda Castano is a 54 y.o. female who is on day 2/7 of pharmacy to dose vancomycin.    Indication: Skin and Soft Tissue  Consulting Provider: Dr. Galvin (Dr. Ellsworth following ID)  Planned Duration of Therapy: 7 days   Current Dose : 750mg iv q8h   (auc <400)  MRSA PCR performed: No  Culture/Source:   6/10 Bcx x2: In process  6/11 BFCx (hip aspirate): 1 + WBCs    Target: -600 mg/L.hr   Other Antimicrobials: Ceftriaxone    Vitals/Labs  Ht: 157.5 cm (62\"); Wt: 94.3 kg (207 lb 14.3 oz)  Temp Readings from Last 1 Encounters:   06/11/25 99.1 °F (37.3 °C) (Oral)    Estimated Creatinine Clearance: 95.6 mL/min (by C-G formula based on SCr of 0.72 mg/dL).       Lab Results   Component Value Date    [facility] 11.20 06/12/2025       Results from last 7 days   Lab Units 06/12/25  0225 06/11/25  0734 06/10/25  2243   CREATININE mg/dL 0.74  0.77 0.72 0.71   WBC 10*3/mm3  --  10.60 10.56     Assessment/Plan:   ID is following.   Vancomycin Dose:   will change order to vancomycin 1250 mg IV every  12  hours  Predictive AUC level for the dose ordered is 479 mg/L.hr, which is within the target of 400-600 mg/L.hr  Vanc Trough has been ordered for 6/13 at 1500     Pharmacy will follow patient's kidney function and will adjust doses and obtain levels as necessary. Thank you for involving pharmacy in this patient's care. Please contact pharmacy with any questions or concerns.                            Jade Salgado, [facility]    Clinical Pharmacist      "

## 2025-06-12 NOTE — DISCHARGE PLACEMENT REQUEST
"Linda Castano (54 y.o. Female)       Date of Birth   1970    Social Security Number       Address   [address] [place] [address]    Home Phone   [phone]    MRN   [id number]       Rastafarian   Scientologist    Marital Status                               Admission Date   6/10/2025    Admission Type   Emergency    Admitting Provider   Nik Galvin MD    Attending Provider   Nik Galvin MD    Department, Room/Bed   [address], 92/[address]       Discharge Date       Discharge Disposition       Discharge Destination                                 Attending Provider: Nik Galvin MD    Allergies: Sulfa Antibiotics    Isolation: None   Infection: None   Code Status: CPR    Ht: 157.5 cm (62\")   Wt: 94.3 kg (207 lb 14.3 oz)    Admission Cmt: None   Principal Problem: Acute hip pain, left [M25.552]                   Active Insurance as of 6/10/2025       Primary Coverage       Payor Plan Insurance Group Employer/Plan Group    [employer] MARC[employer]O [id number]       Payor Plan Address Payor Plan Phone Number Payor Plan Fax Number Effective Dates    [address] [phone]  1/1/2025 - None Entered    [facility] [id number]         Subscriber Name Subscriber Birth Date Member ID       Nik Castano 1970 [id number]                     Emergency Contacts        (Rel.) Home Phone Work Phone Mobile Phone    EyadDerek (Spouse) [phone] -- --            "

## 2025-06-12 NOTE — PROGRESS NOTES
Continued Stay Note  [facility]     Patient Name: Linda Castano  MRN: [id number]  Today's Date: 6/12/2025    Admit Date: 6/10/2025        Discharge Plan       Row Name 06/12/25 1614       Plan    Plan Comments Spoke with patient and family at bedside, verified correct information on facesheet and explained the role of CCP. Discussed d/c planning options for potential for IV antibiotics, referral was given to [facility] who has accepted in [facility]. Awaiting final determination from ID. Plan at this time will be to d/c home with [facility], pending IV antibiotic cost/choice. CCP to follow.                   Discharge Codes    No documentation.                       Meg Baker RN

## 2025-06-12 NOTE — PROGRESS NOTES
Name: Linda Castano ADMIT: 6/10/2025   : 1970  PCP: Sintia Kemp APRN    MRN: [id number] LOS: 2 days   AGE/SEX: 54 y.o. female  ROOM: [facility]     Subjective   Subjective   Patient seen this morning.  No acute events overnight.  Hip pain stable, received pain medication this morning.  Reports had several loose stools, did not take MiraLAX this morning.  No signs of active GI bleed.  Hemoglobin has been trending down.    Review of Systems   As above  Objective   Objective   Vital Signs  Temp:  [98.6 °F (37 °C)-99.9 °F (37.7 °C)] 99.5 °F (37.5 °C)  Heart Rate:  [77-83] 77  Resp:  [18] 18  BP: (124-150)/(57-75) 124/62  SpO2:  [90 %-100 %] 90 %  on   ;   Device (Oxygen Therapy): room air  Body mass index is 38.02 kg/m².  Physical Exam    General: Alert, lying in bed, not in distress,  HEENT: Normocephalic, atraumatic  CV: Regular rate and rhythm, no murmurs rubs or gallops  Lungs: Clear to auscultation bilaterally, no crackles or wheezes  Abdomen: Soft, nontender, nondistended  Extremities: Left hip dressing in place, swollen.  No obvious signs of significant hematoma    Results Review     I reviewed the patient's new clinical results.  Results from last 7 days   Lab Units 06/13/25  0418 06/12/25  0225 06/11/25  0734 06/10/25  2243   WBC 10*3/mm3 9.59  --  10.60 10.56   HEMOGLOBIN g/dL 8.5* 9.6* 10.3* 11.2*   PLATELETS 10*3/mm3 251  --  262 305     Results from last 7 days   Lab Units 2534 06/10/25  2243   SODIUM mmol/L 144 138 138 137   POTASSIUM mmol/L 4.9 4.4 4.1 4.7   CHLORIDE mmol/L 109* 108* 107 104   CO2 mmol/L 25.0 20.8* 24.0 22.0   BUN mg/dL 15.0 13.0 12.0 14.0   CREATININE mg/dL 0.94 0.74  0.77 0.72 0.71   GLUCOSE mg/dL 97 180* 95 107*   Estimated Creatinine Clearance: 73.2 mL/min (by C-G formula based on SCr of 0.94 mg/dL).  Results from last 7 days   Lab Units 25  0418 06/10/25  2243   ALBUMIN g/dL 2.9* 4.0   BILIRUBIN mg/dL <0.2 0.4   ALK  "PHOS U/L 107 133*   AST (SGOT) U/L 31 27   ALT (SGPT) U/L 41* 37*     Results from last 7 days   Lab Units 06/13/25  0418 06/12/25  0225 06/11/25  0734 06/10/25  2243   CALCIUM mg/dL 8.3* 8.2* 8.5* 9.1   ALBUMIN g/dL 2.9*  --   --  4.0     Results from last 7 days   Lab Units 06/10/25  2353   LACTATE mmol/L 0.8   No results found for: \"COVID19\"  No results found for: \"HGBA1C\", \"POCGLU\"        FL Guided Aspiration Joint  Narrative: LEFT HIP FLUOROSCOPICALLY GUIDED JOINT ASPIRATION, 6/11/2025     HISTORY:  54-year-old female with left hip pain. MRI demonstrates moderate to  large left hip effusion. Rule out septic joint.     CONSENT:  Procedure, risks and alternatives were discussed in detail with the  patient, including the low risk of allergy and infection; patient  questions were answered, and informed consent was obtained. Timeout was  observed in the procedure room for patient identification and procedure  site verification, and the site was marked by me.     PROCEDURE:  *  Reference Air Kerma: 2.4 mGy     Using sterile technique, 1% lidocaine local anesthetic and fluoroscopic  guidance, a 22-gauge spinal needle was placed into the right hip joint  capsule without difficulty. Aspiration of approximately 12 mL of cloudy  viscous yellow/orange-colored fluid. The needle was removed and manual  pressure was applied achieving adequate hemostasis. A Band-Aid was  applied. Specimen was sent to laboratory as ordered by the requesting  provider. The patient tolerated the procedure well without incident.        Impression: Technically successful left hip joint aspiration with removal of  approximately 12 mL of cloudy viscous yellow/orange-colored fluid.        Procedure performed by ANABELA Blake.  By electronically signing this report, I, the supervising radiologist,  attest that I was not present for the procedure(s) but agree with the  final edited report.     This report was finalized on 6/11/2025 10:55 AM by Dr." Chandra Christie M.D on Workstation: [id number]     MRI Hip Left Without Contrast  Narrative: MRI HIP LEFT WO CONTRAST-     6/11/2025 4:00 AM     INDICATION: acute left hip pain, suspect effusion on CT, possible septic  arthritis.     COMPARISON: CT abdomen pelvis 6/10/2025.     TECHNIQUE: Multiplanar, multisequence MR imaging of the pelvis and hip  was performed without the intravenous administration of contrast.     FINDINGS:  SOFT TISSUES: Mild patchy soft tissue edema anterior and lateral to the  proximal left femur and in the left adductor compartment. No cystic or  solid soft tissue mass. No abnormal bursal fluid collection. No  pathologically enlarged lymph nodes. No well-formed or drainable fluid  collection to suggest abscess. The sciatic nerves are unremarkable as  imaged.     BONES: Tiny likely degenerative subchondral cystic changes at the  anterior/superior left acetabulum. No acute fracture. No concerning bone  marrow lesion or marrow replacing process.     JOINTS: Moderate to large left hip joint effusion. Mild left hip joint  chondromalacia with full-thickness or near full-thickness chondral  fissuring at the anterior/superior left acetabulum. The articular  cartilage in the contralateral (right) hip joint space is fairly well  maintained on the wide field-of-view sequences. Full-thickness or near  full-thickness tear of the left ligamentum teres. Mild DJD at the pubic  symphysis. Mild bilateral SI joint DJD. Partially imaged lumbosacral  spondylosis.     LABRUM: Likely degenerative tear of the anterior/superior left  acetabular labrum. Tiny 0.5 cm associated anterior/superior left  paralabral cyst.     MUSCLES AND TENDONS: Mild edema throughout the left adductor muscles and  in the left tensor fascia ana muscle. The anterior muscles and tendons  are otherwise intact. The gluteal tendons are intact. Significant  muscular fatty atrophy. The proximal hamstring tendons are intact.     Impression:    1.  Nonspecific moderate to large left hip joint effusion with mild soft  tissue edema along the anterior and lateral proximal left femur.  Correlate with ESR and CRP and possible joint fluid analysis if there is  clinical concern for septic arthritis.  2. Full-thickness or near full-thickness tear of the left ligamentum  teres.  3. Mild edema in the left abductor muscles in the left abductor  compartment, which could represent mild muscle strain or nonspecific  myositis.        This report was finalized on 6/11/2025 7:31 AM by Oleksandr Wyatt MD on  Workstation: [id number]     CT Abdomen Pelvis With Contrast  Addendum: Patient does have a left hip effusion. No aggressive osseous   abnormalities are seen.       This report was finalized on 6/11/2025 12:58 AM by Dr. Evelyn Carlos M.D on Workstation: [id number]  Narrative: CT OF THE ABDOMEN PELVIS WITH CONTRAST     HISTORY: Left flank pain and fever     COMPARISON: None available.     TECHNIQUE: Axial CT imaging was obtained through the abdomen and pelvis.  IV contrast was administered.     FINDINGS:  The patient is status post closure of a patent foramen ovale. Images  through the lung bases are degraded by motion artifact. There is a  potential nodule within the left lower lobe, measuring 5 mm. No  suspicious hepatic lesions are seen. The stomach, adrenal glands,  spleen, pancreas, and gallbladder all appear normal there is a duodenal  diverticulum.. The kidneys enhance symmetrically. There is no  hydronephrosis. There are bilateral parapelvic cysts. Uterus appears to  normal. No adnexal masses are seen. No distal ureteral or bladder stones  are seen. There is colonic diverticulosis. There is no bowel  obstruction. The appendix is normal. No acute osseous abnormalities are  seen.     Impression: 1. No acute intra-abdominal or intrapelvic process seen.     2. Indeterminate solid pulmonary nodule measuring 5 mm. In a low-risk  patient with a solid nodule <6 mm,  recommend no follow-up. In a  high-risk patient, CT at 12 months is optional with stronger  consideration if there is suspicious nodule morphology and/or upper lobe  location.     Radiation dose reduction techniques were utilized, including automated  exposure control and exposure modulation based on body size.        This report was finalized on 6/11/2025 12:12 AM by Dr. Evelyn Carlos M.D on Workstation: [id number]       Scheduled Medications  [Held by provider] aspirin, 81 mg, Oral, Q12H  cefTRIAXone, 2,000 mg, Intravenous, Q24H  [Held by provider] docusate sodium, 100 mg, Oral, BID  [Held by provider] meloxicam, 15 mg, Oral, Daily  pantoprazole, 40 mg, Intravenous, BID AC  [Held by provider] polyethylene glycol, 17 g, Oral, BID  sodium chloride, 10 mL, Intravenous, Q12H  vancomycin, 1,250 mg, Intravenous, Q12H    Infusions  Pharmacy to dose vancomycin,     Diet  Diet: Regular/House; Texture: Regular (IDDSI 7); Fluid Consistency: Thin (IDDSI 0)    I have personally reviewed     [x]  Laboratory   [x]  Microbiology   [x]  Radiology   [x]  EKG/Telemetry  [x]  Cardiology/Vascular   []  Pathology    []  Records       Assessment/Plan     Active Hospital Problems    Diagnosis  POA    **Acute hip pain, left [M25.552]  Yes    Septic hip [M00.9]  Unknown    S/P patent foramen ovale closure [Z87.74]  Not Applicable      Resolved Hospital Problems   No resolved problems to display.       54-year-old female with history of prior CVA with PFO s/p closure on aspirin, presented to the ED complaining of hospital because of worsening left hip pain.    Left hip septic arthritis  -Status post irrigation and debridement 06/11/2025 per orthopedic surgery.  - Cultures negative to date.  -On vancomycin and ceftriaxone, tentative plan for  4 weeks of antibiotics per ID if cultures remain negative    Anemia  - Hemoglobin close 11.2 on admission, gradually trended down to 8.5 this morning.  - No signs of active bleeding  -Likely  partly related secondary to blood loss anemia due to surgery however would not expect to see significant drop.  - Was initiated on aspirin 81 twice daily postsurgery for DVT prophylaxis, will hold aspirin for now  - Started empirically on IV PPI, hold meloxicam.  H&H every 8 hours.  Hemoccult blood ordered      History of CVA status with PFO s/p PFO closure  -Completed Plavix, takes aspirin 81 mg daily.  - Follows with cardiology outpatient  - Hold aspirin secondary to anemia and pending workup.    SCDs for DVT prophylaxis.  Full code.  Discussed with patient.  Expected Discharge Date: 6/15/2025; Expected Discharge Time:        Copied text in this note has been reviewed and is accurate as of 06/13/25.         Dictated utilizing Dragon dictation        Dustin Cerna MD  [facility]  06/13/25  11:36 EDT

## 2025-06-12 NOTE — PLAN OF CARE
Goal Outcome Evaluation:              Outcome Evaluation: Patient remains stable, POD1 L hip incision and drainage, naty dressing flashing green and hemovac in place, pain is well managed with PRN Norco tab. Afebrile, VSS, room air to 2L nc at night, assistx1 to the BR with gaitbelt and walker, NVI, voiding intact, regular diet, takes her pills whole, IV abx vancomycin given, vanc trough at 1400H, needs attended, for PT consult and eval, took out 20ml of drainage from hvac this morning, WCTM

## 2025-06-13 LAB
ALBUMIN SERPL-MCNC: 2.9 G/DL (ref 3.5–5.2)
ALBUMIN/GLOB SERPL: 1 G/DL
ALP SERPL-CCNC: 107 U/L (ref 39–117)
ALT SERPL W P-5'-P-CCNC: 41 U/L (ref 1–33)
ANION GAP SERPL CALCULATED.3IONS-SCNC: 10 MMOL/L (ref 5–15)
AST SERPL-CCNC: 31 U/L (ref 1–32)
BASOPHILS # BLD AUTO: 0.05 10*3/MM3 (ref 0–0.2)
BASOPHILS NFR BLD AUTO: 0.5 % (ref 0–1.5)
BILIRUB SERPL-MCNC: <0.2 MG/DL (ref 0–1.2)
BUN SERPL-MCNC: 15 MG/DL (ref 6–20)
BUN/CREAT SERPL: 16 (ref 7–25)
CALCIUM SPEC-SCNC: 8.3 MG/DL (ref 8.6–10.5)
CHLORIDE SERPL-SCNC: 109 MMOL/L (ref 98–107)
CO2 SERPL-SCNC: 25 MMOL/L (ref 22–29)
CREAT SERPL-MCNC: 0.94 MG/DL (ref 0.57–1)
CYTO UR: NORMAL
DEPRECATED RDW RBC AUTO: 40.7 FL (ref 37–54)
EGFRCR SERPLBLD CKD-EPI 2021: 72.3 ML/MIN/1.73
EOSINOPHIL # BLD AUTO: 0.14 10*3/MM3 (ref 0–0.4)
EOSINOPHIL NFR BLD AUTO: 1.5 % (ref 0.3–6.2)
ERYTHROCYTE [DISTWIDTH] IN BLOOD BY AUTOMATED COUNT: 13.1 % (ref 12.3–15.4)
FERRITIN SERPL-MCNC: 172 NG/ML (ref 13–150)
GLOBULIN UR ELPH-MCNC: 2.8 GM/DL
GLUCOSE SERPL-MCNC: 97 MG/DL (ref 65–99)
HCT VFR BLD AUTO: 25 % (ref 34–46.6)
HCT VFR BLD AUTO: 29.2 % (ref 34–46.6)
HGB BLD-MCNC: 8.5 G/DL (ref 12–15.9)
HGB BLD-MCNC: 9.5 G/DL (ref 12–15.9)
IMM GRANULOCYTES # BLD AUTO: 0.03 10*3/MM3 (ref 0–0.05)
IMM GRANULOCYTES NFR BLD AUTO: 0.3 % (ref 0–0.5)
IRON 24H UR-MRATE: 31 MCG/DL (ref 37–145)
IRON SATN MFR SERPL: 12 % (ref 20–50)
LAB AP CASE REPORT: NORMAL
LYMPHOCYTES # BLD AUTO: 2.76 10*3/MM3 (ref 0.7–3.1)
LYMPHOCYTES NFR BLD AUTO: 28.8 % (ref 19.6–45.3)
MCH RBC QN AUTO: 29.1 PG (ref 26.6–33)
MCHC RBC AUTO-ENTMCNC: 34 G/DL (ref 31.5–35.7)
MCV RBC AUTO: 85.6 FL (ref 79–97)
MONOCYTES # BLD AUTO: 0.74 10*3/MM3 (ref 0.1–0.9)
MONOCYTES NFR BLD AUTO: 7.7 % (ref 5–12)
NEUTROPHILS NFR BLD AUTO: 5.87 10*3/MM3 (ref 1.7–7)
NEUTROPHILS NFR BLD AUTO: 61.2 % (ref 42.7–76)
NRBC BLD AUTO-RTO: 0 /100 WBC (ref 0–0.2)
PATH REPORT.FINAL DX SPEC: NORMAL
PATH REPORT.GROSS SPEC: NORMAL
PLATELET # BLD AUTO: 251 10*3/MM3 (ref 140–450)
PMV BLD AUTO: 9.6 FL (ref 6–12)
POTASSIUM SERPL-SCNC: 4.9 MMOL/L (ref 3.5–5.2)
PROT SERPL-MCNC: 5.7 G/DL (ref 6–8.5)
RBC # BLD AUTO: 2.92 10*6/MM3 (ref 3.77–5.28)
SODIUM SERPL-SCNC: 144 MMOL/L (ref 136–145)
TIBC SERPL-MCNC: 253 MCG/DL (ref 298–536)
TRANSFERRIN SERPL-MCNC: 170 MG/DL (ref 200–360)
WBC NRBC COR # BLD AUTO: 9.59 10*3/MM3 (ref 3.4–10.8)

## 2025-06-13 PROCEDURE — 83540 ASSAY OF IRON: CPT | Performed by: STUDENT IN AN ORGANIZED HEALTH CARE EDUCATION/TRAINING PROGRAM

## 2025-06-13 PROCEDURE — 82728 ASSAY OF FERRITIN: CPT | Performed by: STUDENT IN AN ORGANIZED HEALTH CARE EDUCATION/TRAINING PROGRAM

## 2025-06-13 PROCEDURE — 99024 POSTOP FOLLOW-UP VISIT: CPT | Performed by: ORTHOPAEDIC SURGERY

## 2025-06-13 PROCEDURE — 85018 HEMOGLOBIN: CPT | Performed by: STUDENT IN AN ORGANIZED HEALTH CARE EDUCATION/TRAINING PROGRAM

## 2025-06-13 PROCEDURE — 99232 SBSQ HOSP IP/OBS MODERATE 35: CPT | Performed by: STUDENT IN AN ORGANIZED HEALTH CARE EDUCATION/TRAINING PROGRAM

## 2025-06-13 PROCEDURE — 80053 COMPREHEN METABOLIC PANEL: CPT | Performed by: HOSPITALIST

## 2025-06-13 PROCEDURE — 85025 COMPLETE CBC W/AUTO DIFF WBC: CPT | Performed by: HOSPITALIST

## 2025-06-13 PROCEDURE — 25010000002 VANCOMYCIN HCL 1.25 G RECONSTITUTED SOLUTION 1 EACH VIAL: Performed by: STUDENT IN AN ORGANIZED HEALTH CARE EDUCATION/TRAINING PROGRAM

## 2025-06-13 PROCEDURE — 25010000002 CEFTRIAXONE PER 250 MG: Performed by: ORTHOPAEDIC SURGERY

## 2025-06-13 PROCEDURE — 25810000003 SODIUM CHLORIDE 0.9 % SOLUTION 250 ML FLEX CONT: Performed by: STUDENT IN AN ORGANIZED HEALTH CARE EDUCATION/TRAINING PROGRAM

## 2025-06-13 PROCEDURE — 84466 ASSAY OF TRANSFERRIN: CPT | Performed by: STUDENT IN AN ORGANIZED HEALTH CARE EDUCATION/TRAINING PROGRAM

## 2025-06-13 PROCEDURE — 85014 HEMATOCRIT: CPT | Performed by: STUDENT IN AN ORGANIZED HEALTH CARE EDUCATION/TRAINING PROGRAM

## 2025-06-13 RX ORDER — PANTOPRAZOLE SODIUM 40 MG/10ML
40 INJECTION, POWDER, LYOPHILIZED, FOR SOLUTION INTRAVENOUS
Status: DISCONTINUED | OUTPATIENT
Start: 2025-06-13 | End: 2025-06-16

## 2025-06-13 RX ORDER — ASPIRIN 81 MG/1
81 TABLET ORAL DAILY
Qty: 30 TABLET | Refills: 0 | Status: SHIPPED | OUTPATIENT
Start: 2025-06-13

## 2025-06-13 RX ADMIN — MELOXICAM 15 MG: 15 TABLET ORAL at 08:52

## 2025-06-13 RX ADMIN — CEFTRIAXONE 2000 MG: 2 INJECTION, POWDER, FOR SOLUTION INTRAMUSCULAR; INTRAVENOUS at 12:07

## 2025-06-13 RX ADMIN — ASPIRIN 81 MG: 81 TABLET, COATED ORAL at 08:52

## 2025-06-13 RX ADMIN — Medication 10 ML: at 08:51

## 2025-06-13 RX ADMIN — Medication 10 ML: at 22:25

## 2025-06-13 RX ADMIN — HYDROCODONE BITARTRATE AND ACETAMINOPHEN 1 TABLET: 5; 325 TABLET ORAL at 06:14

## 2025-06-13 RX ADMIN — PANTOPRAZOLE SODIUM 40 MG: 40 TABLET, DELAYED RELEASE ORAL at 06:14

## 2025-06-13 RX ADMIN — HYDROCODONE BITARTRATE AND ACETAMINOPHEN 1 TABLET: 5; 325 TABLET ORAL at 14:39

## 2025-06-13 RX ADMIN — PANTOPRAZOLE SODIUM 40 MG: 40 INJECTION, POWDER, FOR SOLUTION INTRAVENOUS at 17:10

## 2025-06-13 RX ADMIN — VANCOMYCIN HYDROCHLORIDE 1250 MG: 1.25 INJECTION, POWDER, LYOPHILIZED, FOR SOLUTION INTRAVENOUS at 12:59

## 2025-06-13 RX ADMIN — DOCUSATE SODIUM 100 MG: 100 CAPSULE, LIQUID FILLED ORAL at 08:52

## 2025-06-13 NOTE — PROGRESS NOTES
Orthopedic Progress Note      Patient: Linda Castano  Date of Admission: 6/10/2025  YOB: 1970  Medical Record Number: [id number]    POD # :  2 Days Post-Op Procedure(s) (LRB):  HIP INCISION AND DRAINAGE WITH HANA TABLE (Left)    Patient seen exam this morning overall resting well ready to take some pain medicine but does report hip is improved.    Physical Exam:  54 y.o.  female  Vitals:  Temp:  [98.6 °F (37 °C)-99.9 °F (37.7 °C)] 99.5 °F (37.5 °C)  Heart Rate:  [77-83] 77  Resp:  [18] 18  BP: (124-150)/(57-75) 124/62  alert and oriented    Ext: NV intact. ROM appropriate. Calf is soft and nontender. Negative Homans Sn.  2+ pedal pulses  Skin: Incision clean dry and intact w/out signs or  symptoms of infection.    Activity: Mobilizing Per P.T.   Weight Bearing: As Tolerated    Data Review     Admission on 06/10/2025   Component Date Value Ref Range Status    Glucose 06/10/2025 107 (H)  65 - 99 mg/dL Final    BUN 06/10/2025 14.0  6.0 - 20.0 mg/dL Final    Creatinine 06/10/2025 0.71  0.57 - 1.00 mg/dL Final    Sodium 06/10/2025 137  136 - 145 mmol/L Final    Potassium 06/10/2025 4.7  3.5 - 5.2 mmol/L Final    Chloride 06/10/2025 104  98 - 107 mmol/L Final    CO2 06/10/2025 22.0  22.0 - 29.0 mmol/L Final    Calcium 06/10/2025 9.1  8.6 - 10.5 mg/dL Final    Total Protein 06/10/2025 7.2  6.0 - 8.5 g/dL Final    Albumin 06/10/2025 4.0  3.5 - 5.2 g/dL Final    ALT (SGPT) 06/10/2025 37 (H)  1 - 33 U/L Final    AST (SGOT) 06/10/2025 27  1 - 32 U/L Final    Alkaline Phosphatase 06/10/2025 133 (H)  39 - 117 U/L Final    Total Bilirubin 06/10/2025 0.4  0.0 - 1.2 mg/dL Final    Globulin 06/10/2025 3.2  gm/dL Final    A/G Ratio 06/10/2025 1.3  g/dL Final    BUN/Creatinine Ratio 06/10/2025 19.7  7.0 - 25.0 Final    Anion Gap 06/10/2025 11.0  5.0 - 15.0 mmol/L Final    eGFR 06/10/2025 101.2  >60.0 mL/min/1.73 Final    Color, UA 06/10/2025 Yellow  Yellow, Straw Final    Appearance, UA 06/10/2025 Clear  Clear Final     pH, UA 06/10/2025 5.5  5.0 - 8.0 Final    Specific Gravity, UA 06/10/2025 1.019  1.005 - 1.030 Final    Glucose, UA 06/10/2025 Negative  Negative Final    Ketones, UA 06/10/2025 15 mg/dL (1+) (A)  Negative Final    Bilirubin, UA 06/10/2025 Negative  Negative Final    Blood, UA 06/10/2025 Negative  Negative Final    Protein, UA 06/10/2025 Negative  Negative Final    Leuk Esterase, UA 06/10/2025 Small (1+) (A)  Negative Final    Nitrite, UA 06/10/2025 Negative  Negative Final    Urobilinogen, UA 06/10/2025 0.2 E.U./dL  0.2 - 1.0 E.U./dL Final    WBC 06/10/2025 10.56  3.40 - 10.80 10*3/mm3 Final    RBC 06/10/2025 3.91  3.77 - 5.28 10*6/mm3 Final    Hemoglobin 06/10/2025 11.2 (L)  12.0 - 15.9 g/dL Final    Hematocrit 06/10/2025 33.7 (L)  34.0 - 46.6 % Final    MCV 06/10/2025 86.2  79.0 - 97.0 fL Final    MCH 06/10/2025 28.6  26.6 - 33.0 pg Final    MCHC 06/10/2025 33.2  31.5 - 35.7 g/dL Final    RDW 06/10/2025 13.2  12.3 - 15.4 % Final    RDW-SD 06/10/2025 41.3  37.0 - 54.0 fl Final    MPV 06/10/2025 9.4  6.0 - 12.0 fL Final    Platelets 06/10/2025 305  140 - 450 10*3/mm3 Final    Neutrophil % 06/10/2025 77.4 (H)  42.7 - 76.0 % Final    Lymphocyte % 06/10/2025 13.4 (L)  19.6 - 45.3 % Final    Monocyte % 06/10/2025 7.9  5.0 - 12.0 % Final    Eosinophil % 06/10/2025 0.5  0.3 - 6.2 % Final    Basophil % 06/10/2025 0.3  0.0 - 1.5 % Final    Immature Grans % 06/10/2025 0.5  0.0 - 0.5 % Final    Neutrophils, Absolute 06/10/2025 8.18 (H)  1.70 - 7.00 10*3/mm3 Final    Lymphocytes, Absolute 06/10/2025 1.42  0.70 - 3.10 10*3/mm3 Final    Monocytes, Absolute 06/10/2025 0.83  0.10 - 0.90 10*3/mm3 Final    Eosinophils, Absolute 06/10/2025 0.05  0.00 - 0.40 10*3/mm3 Final    Basophils, Absolute 06/10/2025 0.03  0.00 - 0.20 10*3/mm3 Final    Immature Grans, Absolute 06/10/2025 0.05  0.00 - 0.05 10*3/mm3 Final    nRBC 06/10/2025 0.0  0.0 - 0.2 /100 WBC Final    Lipase 06/10/2025 20  13 - 60 U/L Final    Sed Rate 06/10/2025 43 (H)   0 - 30 mm/hr Final    C-Reactive Protein 06/10/2025 6.89 (H)  0.00 - 0.50 mg/dL Final    Blood Culture 06/10/2025 No growth at 2 days   Preliminary    Blood Culture 06/10/2025 No growth at 2 days   Preliminary    Lactate 06/10/2025 0.8  0.5 - 2.0 mmol/L Final    RBC, UA 06/10/2025 0-2  None Seen, 0-2 /HPF Final    WBC, UA 06/10/2025 3-5 (A)  None Seen, 0-2 /HPF Final    Urine culture not indicated.    Bacteria, UA 06/10/2025 None Seen  None Seen /HPF Final    Squamous Epithelial Cells, UA 06/10/2025 0-2  None Seen, 0-2 /HPF Final    Hyaline Casts, UA 06/10/2025 None Seen  None Seen /LPF Final    Methodology 06/10/2025 Automated Microscopy   Final    Glucose 06/11/2025 95  65 - 99 mg/dL Final    BUN 06/11/2025 12.0  6.0 - 20.0 mg/dL Final    Creatinine 06/11/2025 0.72  0.57 - 1.00 mg/dL Final    Sodium 06/11/2025 138  136 - 145 mmol/L Final    Potassium 06/11/2025 4.1  3.5 - 5.2 mmol/L Final    Chloride 06/11/2025 107  98 - 107 mmol/L Final    CO2 06/11/2025 24.0  22.0 - 29.0 mmol/L Final    Calcium 06/11/2025 8.5 (L)  8.6 - 10.5 mg/dL Final    BUN/Creatinine Ratio 06/11/2025 16.7  7.0 - 25.0 Final    Anion Gap 06/11/2025 7.0  5.0 - 15.0 mmol/L Final    eGFR 06/11/2025 99.5  >60.0 mL/min/1.73 Final    WBC 06/11/2025 10.60  3.40 - 10.80 10*3/mm3 Final    RBC 06/11/2025 3.55 (L)  3.77 - 5.28 10*6/mm3 Final    Hemoglobin 06/11/2025 10.3 (L)  12.0 - 15.9 g/dL Final    Hematocrit 06/11/2025 32.0 (L)  34.0 - 46.6 % Final    MCV 06/11/2025 90.1  79.0 - 97.0 fL Final    MCH 06/11/2025 29.0  26.6 - 33.0 pg Final    MCHC 06/11/2025 32.2  31.5 - 35.7 g/dL Final    RDW 06/11/2025 13.6  12.3 - 15.4 % Final    RDW-SD 06/11/2025 44.6  37.0 - 54.0 fl Final    MPV 06/11/2025 9.5  6.0 - 12.0 fL Final    Platelets 06/11/2025 262  140 - 450 10*3/mm3 Final    Body Fluid Culture 06/11/2025 No growth at 2 days   Preliminary    Gram Stain 06/11/2025 Rare (1+) WBCs seen   Preliminary    Gram Stain 06/11/2025 No organisms seen   Preliminary     Crystals, Fluid 06/11/2025 No crystals seen   Final    Color, Fluid 06/11/2025 Red   Final    Appearance, Fluid 06/11/2025 Cloudy (A)  Clear Final    RBC, Fluid 06/11/2025 40,000  /mm3 Final    Nucleated Cells, Fluid 06/11/2025 58,720  /mm3 Final    Method: 06/11/2025 Automated Sysmex XN Method   Final    Neutrophils, Fluid % 06/11/2025 89  % Final    Lymphocytes, Fluid % 06/11/2025 8  % Final    Monocytes, Fluid % 06/11/2025 3  % Final    C-Reactive Protein 06/11/2025 5.78 (H)  0.00 - 0.50 mg/dL Final    Wound Culture 06/11/2025 No growth at 2 days   Preliminary    Gram Stain 06/11/2025 Moderate (3+) WBCs seen   Preliminary    Gram Stain 06/11/2025 No organisms seen   Preliminary    Wound Culture 06/11/2025 No growth at 2 days   Preliminary    Gram Stain 06/11/2025 Few (2+) WBCs seen   Preliminary    Gram Stain 06/11/2025 No organisms seen   Preliminary    Tissue Culture 06/11/2025 No growth at 2 days   Preliminary    Gram Stain 06/11/2025 Moderate (3+) WBCs seen   Preliminary    Gram Stain 06/11/2025 No organisms seen   Preliminary    Case Report 06/11/2025    Final                    Value:Surgical Pathology Report                         Case: [id number]                                  Authorizing Provider:  Nik Galvin MD      Collected:           06/11/2025 03:50 PM          Ordering Location:     [facility]  Received:            06/11/2025 08:21 PM                                 [address]                                                                       Pathologist:           Carlos Keene MD                                                         Specimen:    Hip, Left, L hip; recv'd from micro                                                        Final Diagnosis 06/11/2025    Final                    Value:1.  Soft tissue, left hip, excision: Extensive fibrinous debris with extensive acute inflammation and areas of necroinflammatory debris and fragments of fibrous  "tissue    Comment: A minute foci of degenerated bone was identified with surrounding acute inflammation      Gross Description 06/11/2025    Final                    Value:1. Hip, Left.  Received fresh and subsequently placed in formalin, labeled \"left hip\" is a 1.5 x 0.6 x 0.3 cm tan-white irregular rubbery tissue, which is submitted in toto as 1A.    jap/o/josé miguel        Microscopic Description 06/11/2025    Final                    Value:Unless \"gross only\" is specified, the final diagnosis for each specimen is based on microscopic examination of tissue.      Creatinine 06/12/2025 0.77  0.57 - 1.00 mg/dL Final    eGFR 06/12/2025 91.8  >60.0 mL/min/1.73 Final    Hemoglobin 06/12/2025 9.6 (L)  12.0 - 15.9 g/dL Final    Hematocrit 06/12/2025 29.6 (L)  34.0 - 46.6 % Final    Vancomycin Trough 06/12/2025 11.20  5.00 - 20.00 mcg/mL Final    Glucose 06/12/2025 180 (H)  65 - 99 mg/dL Final    BUN 06/12/2025 13.0  6.0 - 20.0 mg/dL Final    Creatinine 06/12/2025 0.74  0.57 - 1.00 mg/dL Final    Sodium 06/12/2025 138  136 - 145 mmol/L Final    Potassium 06/12/2025 4.4  3.5 - 5.2 mmol/L Final    Chloride 06/12/2025 108 (H)  98 - 107 mmol/L Final    CO2 06/12/2025 20.8 (L)  22.0 - 29.0 mmol/L Final    Calcium 06/12/2025 8.2 (L)  8.6 - 10.5 mg/dL Final    BUN/Creatinine Ratio 06/12/2025 17.6  7.0 - 25.0 Final    Anion Gap 06/12/2025 9.2  5.0 - 15.0 mmol/L Final    eGFR 06/12/2025 96.3  >60.0 mL/min/1.73 Final    Glucose 06/13/2025 97  65 - 99 mg/dL Final    BUN 06/13/2025 15.0  6.0 - 20.0 mg/dL Final    Creatinine 06/13/2025 0.94  0.57 - 1.00 mg/dL Final    Sodium 06/13/2025 144  136 - 145 mmol/L Final    Potassium 06/13/2025 4.9  3.5 - 5.2 mmol/L Final    Chloride 06/13/2025 109 (H)  98 - 107 mmol/L Final    CO2 06/13/2025 25.0  22.0 - 29.0 mmol/L Final    Calcium 06/13/2025 8.3 (L)  8.6 - 10.5 mg/dL Final    Total Protein 06/13/2025 5.7 (L)  6.0 - 8.5 g/dL Final    Albumin 06/13/2025 2.9 (L)  3.5 - 5.2 g/dL Final    ALT (SGPT) " 06/13/2025 41 (H)  1 - 33 U/L Final    AST (SGOT) 06/13/2025 31  1 - 32 U/L Final    Alkaline Phosphatase 06/13/2025 107  39 - 117 U/L Final    Total Bilirubin 06/13/2025 <0.2  0.0 - 1.2 mg/dL Final    Globulin 06/13/2025 2.8  gm/dL Final    A/G Ratio 06/13/2025 1.0  g/dL Final    BUN/Creatinine Ratio 06/13/2025 16.0  7.0 - 25.0 Final    Anion Gap 06/13/2025 10.0  5.0 - 15.0 mmol/L Final    eGFR 06/13/2025 72.3  >60.0 mL/min/1.73 Final    WBC 06/13/2025 9.59  3.40 - 10.80 10*3/mm3 Final    RBC 06/13/2025 2.92 (L)  3.77 - 5.28 10*6/mm3 Final    Hemoglobin 06/13/2025 8.5 (L)  12.0 - 15.9 g/dL Final    Hematocrit 06/13/2025 25.0 (L)  34.0 - 46.6 % Final    MCV 06/13/2025 85.6  79.0 - 97.0 fL Final    MCH 06/13/2025 29.1  26.6 - 33.0 pg Final    MCHC 06/13/2025 34.0  31.5 - 35.7 g/dL Final    RDW 06/13/2025 13.1  12.3 - 15.4 % Final    RDW-SD 06/13/2025 40.7  37.0 - 54.0 fl Final    MPV 06/13/2025 9.6  6.0 - 12.0 fL Final    Platelets 06/13/2025 251  140 - 450 10*3/mm3 Final    Neutrophil % 06/13/2025 61.2  42.7 - 76.0 % Final    Lymphocyte % 06/13/2025 28.8  19.6 - 45.3 % Final    Monocyte % 06/13/2025 7.7  5.0 - 12.0 % Final    Eosinophil % 06/13/2025 1.5  0.3 - 6.2 % Final    Basophil % 06/13/2025 0.5  0.0 - 1.5 % Final    Immature Grans % 06/13/2025 0.3  0.0 - 0.5 % Final    Neutrophils, Absolute 06/13/2025 5.87  1.70 - 7.00 10*3/mm3 Final    Lymphocytes, Absolute 06/13/2025 2.76  0.70 - 3.10 10*3/mm3 Final    Monocytes, Absolute 06/13/2025 0.74  0.10 - 0.90 10*3/mm3 Final    Eosinophils, Absolute 06/13/2025 0.14  0.00 - 0.40 10*3/mm3 Final    Basophils, Absolute 06/13/2025 0.05  0.00 - 0.20 10*3/mm3 Final    Immature Grans, Absolute 06/13/2025 0.03  0.00 - 0.05 10*3/mm3 Final    nRBC 06/13/2025 0.0  0.0 - 0.2 /100 WBC Final    Iron 06/13/2025 31 (L)  37 - 145 mcg/dL Final    Iron Saturation (TSAT) 06/13/2025 12 (L)  20 - 50 % Final    Transferrin 06/13/2025 170 (L)  200 - 360 mg/dL Final    TIBC 06/13/2025 253 (L)   298 - 536 mcg/dL Final    Ferritin 06/13/2025 172.00 (H)  13.00 - 150.00 ng/mL Final       FL Guided Aspiration Joint  Result Date: 6/11/2025  Narrative: LEFT HIP FLUOROSCOPICALLY GUIDED JOINT ASPIRATION, 6/11/2025  HISTORY: 54-year-old female with left hip pain. MRI demonstrates moderate to large left hip effusion. Rule out septic joint.  CONSENT: Procedure, risks and alternatives were discussed in detail with the patient, including the low risk of allergy and infection; patient questions were answered, and informed consent was obtained. Timeout was observed in the procedure room for patient identification and procedure site verification, and the site was marked by me.  PROCEDURE: *  Reference Air Kerma: 2.4 mGy  Using sterile technique, 1% lidocaine local anesthetic and fluoroscopic guidance, a 22-gauge spinal needle was placed into the right hip joint capsule without difficulty. Aspiration of approximately 12 mL of cloudy viscous yellow/orange-colored fluid. The needle was removed and manual pressure was applied achieving adequate hemostasis. A Band-Aid was applied. Specimen was sent to laboratory as ordered by the requesting provider. The patient tolerated the procedure well without incident.       Impression: Technically successful left hip joint aspiration with removal of approximately 12 mL of cloudy viscous yellow/orange-colored fluid.   Procedure performed by ANABELA Blake. By electronically signing this report, I, the supervising radiologist, attest that I was not present for the procedure(s) but agree with the final edited report.  This report was finalized on 6/11/2025 10:55 AM by Dr. Chandra Christie M.D on Workstation: [id number]      MRI Hip Left Without Contrast  Result Date: 6/11/2025  Narrative: MRI HIP LEFT WO CONTRAST-  6/11/2025 4:00 AM  INDICATION: acute left hip pain, suspect effusion on CT, possible septic arthritis.  COMPARISON: CT abdomen pelvis 6/10/2025.  TECHNIQUE: Multiplanar,  multisequence MR imaging of the pelvis and hip was performed without the intravenous administration of contrast.  FINDINGS: SOFT TISSUES: Mild patchy soft tissue edema anterior and lateral to the proximal left femur and in the left adductor compartment. No cystic or solid soft tissue mass. No abnormal bursal fluid collection. No pathologically enlarged lymph nodes. No well-formed or drainable fluid collection to suggest abscess. The sciatic nerves are unremarkable as imaged.  BONES: Tiny likely degenerative subchondral cystic changes at the anterior/superior left acetabulum. No acute fracture. No concerning bone marrow lesion or marrow replacing process.  JOINTS: Moderate to large left hip joint effusion. Mild left hip joint chondromalacia with full-thickness or near full-thickness chondral fissuring at the anterior/superior left acetabulum. The articular cartilage in the contralateral (right) hip joint space is fairly well maintained on the wide field-of-view sequences. Full-thickness or near full-thickness tear of the left ligamentum teres. Mild DJD at the pubic symphysis. Mild bilateral SI joint DJD. Partially imaged lumbosacral spondylosis.  LABRUM: Likely degenerative tear of the anterior/superior left acetabular labrum. Tiny 0.5 cm associated anterior/superior left paralabral cyst.  MUSCLES AND TENDONS: Mild edema throughout the left adductor muscles and in the left tensor fascia ana muscle. The anterior muscles and tendons are otherwise intact. The gluteal tendons are intact. Significant muscular fatty atrophy. The proximal hamstring tendons are intact.      Impression:  1. Nonspecific moderate to large left hip joint effusion with mild soft tissue edema along the anterior and lateral proximal left femur. Correlate with ESR and CRP and possible joint fluid analysis if there is clinical concern for septic arthritis. 2. Full-thickness or near full-thickness tear of the left ligamentum teres. 3. Mild edema in  the left abductor muscles in the left abductor compartment, which could represent mild muscle strain or nonspecific myositis.   This report was finalized on 6/11/2025 7:31 AM by Oleksandr Wyatt MD on Workstation: [id number]      CT Abdomen Pelvis With Contrast  Addendum Date: 6/11/2025  Addendum: Patient does have a left hip effusion. No aggressive osseous abnormalities are seen.  This report was finalized on 6/11/2025 12:58 AM by Dr. Evelyn Carlos M.D on Workstation: [id number]      Result Date: 6/11/2025  Narrative: CT OF THE ABDOMEN PELVIS WITH CONTRAST  HISTORY: Left flank pain and fever  COMPARISON: None available.  TECHNIQUE: Axial CT imaging was obtained through the abdomen and pelvis. IV contrast was administered.  FINDINGS: The patient is status post closure of a patent foramen ovale. Images through the lung bases are degraded by motion artifact. There is a potential nodule within the left lower lobe, measuring 5 mm. No suspicious hepatic lesions are seen. The stomach, adrenal glands, spleen, pancreas, and gallbladder all appear normal there is a duodenal diverticulum.. The kidneys enhance symmetrically. There is no hydronephrosis. There are bilateral parapelvic cysts. Uterus appears to normal. No adnexal masses are seen. No distal ureteral or bladder stones are seen. There is colonic diverticulosis. There is no bowel obstruction. The appendix is normal. No acute osseous abnormalities are seen.      Impression: 1. No acute intra-abdominal or intrapelvic process seen.  2. Indeterminate solid pulmonary nodule measuring 5 mm. In a low-risk patient with a solid nodule <6 mm, recommend no follow-up. In a high-risk patient, CT at 12 months is optional with stronger consideration if there is suspicious nodule morphology and/or upper lobe location.  Radiation dose reduction techniques were utilized, including automated exposure control and exposure modulation based on body size.   This report was finalized on  6/11/2025 12:12 AM by Dr. Evelyn Carlos M.D on Workstation: [id number]        Medications:  [Held by provider] aspirin, 81 mg, Oral, Q12H  cefTRIAXone, 2,000 mg, Intravenous, Q24H  [Held by provider] docusate sodium, 100 mg, Oral, BID  [Held by provider] meloxicam, 15 mg, Oral, Daily  pantoprazole, 40 mg, Intravenous, BID AC  [Held by provider] polyethylene glycol, 17 g, Oral, BID  sodium chloride, 10 mL, Intravenous, Q12H  vancomycin, 1,250 mg, Intravenous, Q12H        acetaminophen **OR** acetaminophen **OR** acetaminophen    acetaminophen    senna-docusate sodium **AND** polyethylene glycol **AND** bisacodyl **AND** bisacodyl    bisacodyl    calcium carbonate    HYDROcodone-acetaminophen    ketorolac    magnesium hydroxide    Morphine    ondansetron ODT **OR** ondansetron    ondansetron ODT **OR** ondansetron    Pharmacy to dose vancomycin    sodium chloride    sodium chloride      Minimal drain output plan to remove this morning.    Assessment:  Doing well POD  # 2 Days Post-Op Procedure(s) (LRB):  HIP INCISION AND DRAINAGE WITH HANA TABLE (Left)  Problems Addressed this Visit          Musculoskeletal and Injuries    * (Principal) Acute hip pain, left       Other    Septic hip - Primary    Relevant Medications    cefTRIAXone (ROCEPHIN) 2,000 mg in sodium chloride 0.9 % 100 mL MBP    Other Relevant Orders    Case Request (Completed)    Anaerobic Culture - Surgical Site, Hip, Left    Anaerobic Culture - Surgical Site, Hip, Left    Wound Culture - Surgical Site, Hip, Left (Completed)    Wound Culture - Surgical Site, Hip, Left (Completed)    Anaerobic Culture - Tissue, Hip, Left    Tissue / Bone Culture - Tissue, Hip, Left (Completed)     Other Visit Diagnoses         Fever, unspecified fever cause          Effusion of left hip              Diagnoses         Codes Comments      Septic hip    -  Primary ICD-10-CM: M00.9  ICD-9-CM: 711.05       Acute hip pain, left     ICD-10-CM: M25.552  ICD-9-CM: 719.45        Fever, unspecified fever cause     ICD-10-CM: R50.9  ICD-9-CM: 780.60       Effusion of left hip     ICD-10-CM: M25.452  ICD-9-CM: 719.05             Plan:  Antibiotics per infectious disease.  Will remove drain today discussed with nursing.  Continue efforts to mobilize  Continue Pain Control Measures  Continue incisional Care  DVT prophylaxis    Follow-up with orthopedics in 2 weeks please call with questions or concerns thank you    Ammon Aly MD    Date: 6/13/2025  Time: 11:37 EDT

## 2025-06-13 NOTE — PLAN OF CARE
Problem: Adult Inpatient Plan of Care  Goal: Plan of Care Review  Outcome: Progressing   Goal Outcome Evaluation:  VSS. RA-2L nc prn while sleeping. A&Ox4. POD 2 left hip I&D, Edmund dressing c/d/I and flashing green. Left hip drain removed this am. PRN pain meds, see mar. Low grade temp last night. Pt rested throughout the night with no other issues or complaints.

## 2025-06-13 NOTE — PROGRESS NOTES
"[facility] Clinical Pharmacy Services: Vancomycin Pharmacokinetic Consult Note    Linda Castano is a 54 y.o. female who is on day 3/7 of pharmacy to dose vancomycin.    Indication: Skin and Soft Tissue  Consulting Provider: Dr. Galvin (Dr. Ellsworth following ID)  Planned Duration of Therapy: 7 days   Current Dose : 750mg iv q8h   (auc <400)  MRSA PCR performed: No  Culture/Source:   6/10 Bcx x2: ngx2d  6/11 BFCx (hip aspirate): 1 + WBCs  ngx2d    Target: -600 mg/L.hr   Other Antimicrobials: Ceftriaxone    Vitals/Labs  Ht: 157.5 cm (62\"); Wt: 94.3 kg (207 lb 14.3 oz)  Temp Readings from Last 1 Encounters:   06/11/25 99.1 °F (37.3 °C) (Oral)    Estimated Creatinine Clearance: 95.6 mL/min (by C-G formula based on SCr of 0.72 mg/dL).       Lab Results   Component Value Date    [facility]       Results from last 7 days   Lab Units 06/13/25  0418 06/12/25  0225 06/11/25  0734 06/10/25  2243   CREATININE mg/dL 0.94 0.74  0.77 0.72 0.71   WBC 10*3/mm3 9.59  --  10.60 10.56     Assessment/Plan:   ID is following.   Vancomycin Dose:   will change order to vancomycin 1250 mg IV every  12  hours  Predictive AUC level for the dose ordered is 512 mg/L.hr, which is within the target of 400-600 mg/L.hr  Vanc Trough has been ordered for 6/14 at 1100     Pharmacy will follow patient's kidney function and will adjust doses and obtain levels as necessary. Thank you for involving pharmacy in this patient's care. Please contact pharmacy with any questions or concerns.                            Jade Salgado, [facility]      "

## 2025-06-13 NOTE — PROGRESS NOTES
LOS: 2 days     Chief Complaint: Septic arthritis of the left hip    Interval History: Patient reports she is doing well this morning.  Denies any fevers or chills.  Very tired.    Vital Signs  Temp:  [97.9 °F (36.6 °C)-99.9 °F (37.7 °C)] 99.5 °F (37.5 °C)  Heart Rate:  [74-83] 77  Resp:  [16-18] 18  BP: (124-150)/(57-75) 124/62    Physical Exam:  General: In no acute distress  HEENT: Oropharynx clear, moist mucous membranes  Respiratory: Normal work of breathing  Skin: No rashes or lesions in exposed areas  Extremities: No edema, cyanosis  Access: Peripheral IV.    Antibiotics:  Anti-Infectives (From admission, onward)      Ordered     Dose/Rate Route Frequency Start Stop    06/12/25 1534  Vancomycin HCl 1,250 mg in sodium chloride 0.9 % 250 mL VTB        Ordering Provider: Sailaja Chandler APRN    1,250 mg  200 mL/hr over 75 Minutes Intravenous Every 12 Hours 06/13/25 1600 06/19/25 1559    06/11/25 1549  vancomycin 750 mg in sodium chloride 0.9 % 250 mL IVPB-VTB  Status:  Discontinued        Ordering Provider: Ammon Aly MD    750 mg  333.3 mL/hr over 45 Minutes Intravenous Every 8 Hours 06/11/25 2300 06/12/25 1534    06/11/25 1458  ceFAZolin 2000 mg IVPB in 100 mL NS (MBP)  Status:  Discontinued        Ordering Provider: Ammon Aly MD    2 g  over 30 Minutes Intravenous Once 06/11/25 1459 06/11/25 1540    06/11/25 1045  vancomycin IVPB 2000 mg in 0.9% Sodium Chloride 500 mL        Ordering Provider: Nik Galvin MD    20 mg/kg × 94.3 kg  250 mL/hr over 120 Minutes Intravenous Once 06/11/25 1200 06/11/25 1504    06/11/25 1040  cefTRIAXone (ROCEPHIN) 2,000 mg in sodium chloride 0.9 % 100 mL MBP        Ordering Provider: Ammon Aly MD    2,000 mg  200 mL/hr over 30 Minutes Intravenous Every 24 Hours 06/11/25 1130 06/18/25 1129    06/11/25 1027  cefTRIAXone (ROCEPHIN) 2,000 mg in sodium chloride 0.9 % 100 mL MBP  Status:  Discontinued        Ordering Provider: Nik Galvin MD     2,000 mg  200 mL/hr over 30 Minutes Intravenous Every 24 Hours 06/11/25 1115 06/11/25 1031    06/11/25 1040  Pharmacy to dose vancomycin        Ordering Provider: Ammon Aly MD     Not Applicable Continuous PRN 06/11/25 1040 06/18/25 1039    06/11/25 1027  Pharmacy to dose vancomycin  Status:  Discontinued        Ordering Provider: Nik Galvin MD     Not Applicable Continuous PRN 06/11/25 1027 06/11/25 1031             Results Review:     I reviewed the patient's new clinical results.    Lab Results   Component Value Date    WBC 9.59 06/13/2025    HGB 8.5 (L) 06/13/2025    HCT 25.0 (L) 06/13/2025    MCV 85.6 06/13/2025     06/13/2025     Lab Results   Component Value Date    GLUCOSE 97 06/13/2025    BUN 15.0 06/13/2025    CREATININE 0.94 06/13/2025    EGFRIFAFRI >60 12/15/2020    BCR 16.0 06/13/2025    CO2 25.0 06/13/2025    CALCIUM 8.3 (L) 06/13/2025    ALBUMIN 2.9 (L) 06/13/2025    LABIL2 1.3 12/15/2020    AST 31 06/13/2025    ALT 41 (H) 06/13/2025       Lab Results   Component Value Date    VANCOTROUGH 11.20 06/12/2025        Microbiology:  Microbiology Results (last 10 days)       Procedure Component Value - Date/Time    Tissue / Bone Culture - Tissue, Hip, Left [[id number]] Collected: 06/11/25 1550    Lab Status: Preliminary result Specimen: Tissue from Hip, Left Updated: 06/13/25 0645     Tissue Culture No growth at 2 days     Gram Stain Moderate (3+) WBCs seen      No organisms seen    Wound Culture - Surgical Site, Hip, Left [[id number]] Collected: 06/11/25 1549    Lab Status: Preliminary result Specimen: Surgical Site from Hip, Left Updated: 06/13/25 0646     Wound Culture No growth at 2 days     Gram Stain Few (2+) WBCs seen      No organisms seen    Wound Culture - Surgical Site, Hip, Left [[id number]] Collected: 06/11/25 1547    Lab Status: Preliminary result Specimen: Surgical Site from Hip, Left Updated: 06/13/25 0646     Wound Culture No growth at 2 days     Gram Stain Moderate  (3+) WBCs seen      No organisms seen    Body Fluid Culture - Aspirate, Hip, Left [[id number]] Collected: 06/11/25 0856    Lab Status: Preliminary result Specimen: Aspirate from Hip, Left Updated: 06/13/25 0646     Body Fluid Culture No growth at 2 days     Gram Stain Rare (1+) WBCs seen      No organisms seen    Blood Culture - Blood, Arm, Right [[id number]]  (Normal) Collected: 06/10/25 2359    Lab Status: Preliminary result Specimen: Blood from Arm, Right Updated: 06/13/25 0015     Blood Culture No growth at 2 days    Blood Culture - Blood, Arm, Left [[id number]]  (Normal) Collected: 06/10/25 2353    Lab Status: Preliminary result Specimen: Blood from Arm, Left Updated: 06/13/25 0015     Blood Culture No growth at 2 days               Isolation:   No active isolations    Assessment      #Left hip native septic arthritis    Cultures no growth to date from aspiration in the OR.  Continue empiric ceftriaxone 2 g daily and vancomycin goal -600.  Appreciate pharmacy's help with dosing.  Likely will need a 4-week course based on culture results.  If cultures remain negative then we will plan for 4 weeks of above empiric therapy for culture-negative septic arthritis.    ID will follow.   -----------------------------------------------------------------------------------------------  The above decisions regarding prescribing and/or discontinuation of antimicrobials incorporate elements of engaging in complex medical decision-making associated with antimicrobial therapy including consideration of patient-specific resistance patterns, consideration of local resistance patterns, the potential to develop resistant strains or new infections while on antimicrobial therapy, patient's recent antibiotic exposure or lack thereof, interactions between antibiotics and other medications, consideration of patient's other co-morbidities in prescribing antibiotic therapy, public health considerations such as risk of transmission of  infection or lack thereof, and public health considerations to minimize development of antimicrobial resistance in the community.     The patient was provided education re: his or her specific antimicrobial selection including indications, benefits, and risks.

## 2025-06-13 NOTE — PROGRESS NOTES
Continued Stay Note  [facility]     Patient Name: Linda Castano  MRN: [id number]  Today's Date: 6/13/2025    Admit Date: 6/10/2025    Plan: TBD   Discharge Plan       Row Name 06/13/25 1343       Plan    Plan TBD    Plan Comments IV vs oral antibiotics, TBD. [facility] IV infusion is following if IV antibiotics needed d/c. Nursing/CCP to call [facility] if IV will be needed over the weekend to ensure meds can be delivered and they have staff available. If patient d/c's home with oral antibiotics then plan will be home with family support. CCP to follow for needs.                   Discharge Codes    No documentation.                 Expected Discharge Date and Time       Expected Discharge Date Expected Discharge Time    Kumar 15, 2025               Meg Baker RN

## 2025-06-13 NOTE — PLAN OF CARE
Goal Outcome Evaluation:POD1 Left hip I&D with VALDEMAR blinking green and accordian drain, worked well with therapy, continues on IV Vanc and Rocephin. Trough scheduled for 6/13 at 1500. Will cont to monitor. Call light in reach.

## 2025-06-13 NOTE — PLAN OF CARE
Goal Outcome Evaluation:POD2 Left hip I&D with VALDEMAR dressing, continues on IV Rocephin and Vanc, occult stool ordered, pain controlled with po pain medication, awaiting plan for IV vs PO ABX. Will cont to monitor.

## 2025-06-14 LAB
ALBUMIN SERPL-MCNC: 3 G/DL (ref 3.5–5.2)
ALBUMIN/GLOB SERPL: 1.1 G/DL
ALP SERPL-CCNC: 103 U/L (ref 39–117)
ALT SERPL W P-5'-P-CCNC: 38 U/L (ref 1–33)
ANION GAP SERPL CALCULATED.3IONS-SCNC: 7.8 MMOL/L (ref 5–15)
AST SERPL-CCNC: 20 U/L (ref 1–32)
BACTERIA SPEC AEROBE CULT: NORMAL
BASOPHILS # BLD AUTO: 0.06 10*3/MM3 (ref 0–0.2)
BASOPHILS NFR BLD AUTO: 0.7 % (ref 0–1.5)
BILIRUB SERPL-MCNC: <0.2 MG/DL (ref 0–1.2)
BUN SERPL-MCNC: 13 MG/DL (ref 6–20)
BUN/CREAT SERPL: 24.1 (ref 7–25)
CALCIUM SPEC-SCNC: 8.2 MG/DL (ref 8.6–10.5)
CHLORIDE SERPL-SCNC: 106 MMOL/L (ref 98–107)
CO2 SERPL-SCNC: 26.2 MMOL/L (ref 22–29)
CREAT SERPL-MCNC: 0.54 MG/DL (ref 0.57–1)
DEPRECATED RDW RBC AUTO: 42.2 FL (ref 37–54)
EGFRCR SERPLBLD CKD-EPI 2021: 109.6 ML/MIN/1.73
EOSINOPHIL # BLD AUTO: 0.22 10*3/MM3 (ref 0–0.4)
EOSINOPHIL NFR BLD AUTO: 2.7 % (ref 0.3–6.2)
ERYTHROCYTE [DISTWIDTH] IN BLOOD BY AUTOMATED COUNT: 13.3 % (ref 12.3–15.4)
GLOBULIN UR ELPH-MCNC: 2.7 GM/DL
GLUCOSE SERPL-MCNC: 96 MG/DL (ref 65–99)
GRAM STN SPEC: NORMAL
HCT VFR BLD AUTO: 26.2 % (ref 34–46.6)
HCT VFR BLD AUTO: 29.7 % (ref 34–46.6)
HEMOCCULT STL QL: POSITIVE
HGB BLD-MCNC: 8.5 G/DL (ref 12–15.9)
HGB BLD-MCNC: 9.8 G/DL (ref 12–15.9)
IMM GRANULOCYTES # BLD AUTO: 0.03 10*3/MM3 (ref 0–0.05)
IMM GRANULOCYTES NFR BLD AUTO: 0.4 % (ref 0–0.5)
LYMPHOCYTES # BLD AUTO: 2.13 10*3/MM3 (ref 0.7–3.1)
LYMPHOCYTES NFR BLD AUTO: 26 % (ref 19.6–45.3)
MAGNESIUM SERPL-MCNC: 2 MG/DL (ref 1.6–2.6)
MCH RBC QN AUTO: 28.2 PG (ref 26.6–33)
MCHC RBC AUTO-ENTMCNC: 32.4 G/DL (ref 31.5–35.7)
MCV RBC AUTO: 87 FL (ref 79–97)
MONOCYTES # BLD AUTO: 0.68 10*3/MM3 (ref 0.1–0.9)
MONOCYTES NFR BLD AUTO: 8.3 % (ref 5–12)
NEUTROPHILS NFR BLD AUTO: 5.07 10*3/MM3 (ref 1.7–7)
NEUTROPHILS NFR BLD AUTO: 61.9 % (ref 42.7–76)
NRBC BLD AUTO-RTO: 0 /100 WBC (ref 0–0.2)
PHOSPHATE SERPL-MCNC: 4 MG/DL (ref 2.5–4.5)
PLATELET # BLD AUTO: 283 10*3/MM3 (ref 140–450)
PMV BLD AUTO: 9.2 FL (ref 6–12)
POTASSIUM SERPL-SCNC: 4.3 MMOL/L (ref 3.5–5.2)
PROT SERPL-MCNC: 5.7 G/DL (ref 6–8.5)
RBC # BLD AUTO: 3.01 10*6/MM3 (ref 3.77–5.28)
SODIUM SERPL-SCNC: 140 MMOL/L (ref 136–145)
VANCOMYCIN TROUGH SERPL-MCNC: 18.1 MCG/ML (ref 5–20)
WBC NRBC COR # BLD AUTO: 8.19 10*3/MM3 (ref 3.4–10.8)

## 2025-06-14 PROCEDURE — 25010000002 CEFTRIAXONE PER 250 MG: Performed by: INTERNAL MEDICINE

## 2025-06-14 PROCEDURE — 83735 ASSAY OF MAGNESIUM: CPT | Performed by: STUDENT IN AN ORGANIZED HEALTH CARE EDUCATION/TRAINING PROGRAM

## 2025-06-14 PROCEDURE — 25010000002 VANCOMYCIN HCL 1.25 G RECONSTITUTED SOLUTION 1 EACH VIAL: Performed by: STUDENT IN AN ORGANIZED HEALTH CARE EDUCATION/TRAINING PROGRAM

## 2025-06-14 PROCEDURE — 85014 HEMATOCRIT: CPT | Performed by: STUDENT IN AN ORGANIZED HEALTH CARE EDUCATION/TRAINING PROGRAM

## 2025-06-14 PROCEDURE — 85025 COMPLETE CBC W/AUTO DIFF WBC: CPT | Performed by: HOSPITALIST

## 2025-06-14 PROCEDURE — 85018 HEMOGLOBIN: CPT | Performed by: STUDENT IN AN ORGANIZED HEALTH CARE EDUCATION/TRAINING PROGRAM

## 2025-06-14 PROCEDURE — 80202 ASSAY OF VANCOMYCIN: CPT | Performed by: STUDENT IN AN ORGANIZED HEALTH CARE EDUCATION/TRAINING PROGRAM

## 2025-06-14 PROCEDURE — C1751 CATH, INF, PER/CENT/MIDLINE: HCPCS

## 2025-06-14 PROCEDURE — 82272 OCCULT BLD FECES 1-3 TESTS: CPT | Performed by: STUDENT IN AN ORGANIZED HEALTH CARE EDUCATION/TRAINING PROGRAM

## 2025-06-14 PROCEDURE — 25810000003 SODIUM CHLORIDE 0.9 % SOLUTION 250 ML FLEX CONT: Performed by: STUDENT IN AN ORGANIZED HEALTH CARE EDUCATION/TRAINING PROGRAM

## 2025-06-14 PROCEDURE — 80053 COMPREHEN METABOLIC PANEL: CPT | Performed by: HOSPITALIST

## 2025-06-14 PROCEDURE — 05HY33Z INSERTION OF INFUSION DEVICE INTO UPPER VEIN, PERCUTANEOUS APPROACH: ICD-10-PCS | Performed by: STUDENT IN AN ORGANIZED HEALTH CARE EDUCATION/TRAINING PROGRAM

## 2025-06-14 PROCEDURE — 25010000002 VANCOMYCIN 1 G RECONSTITUTED SOLUTION 1 EACH VIAL: Performed by: STUDENT IN AN ORGANIZED HEALTH CARE EDUCATION/TRAINING PROGRAM

## 2025-06-14 PROCEDURE — 99222 1ST HOSP IP/OBS MODERATE 55: CPT | Performed by: INTERNAL MEDICINE

## 2025-06-14 PROCEDURE — 84100 ASSAY OF PHOSPHORUS: CPT | Performed by: STUDENT IN AN ORGANIZED HEALTH CARE EDUCATION/TRAINING PROGRAM

## 2025-06-14 PROCEDURE — 99233 SBSQ HOSP IP/OBS HIGH 50: CPT | Performed by: INTERNAL MEDICINE

## 2025-06-14 RX ORDER — PANTOPRAZOLE SODIUM 40 MG/1
40 TABLET, DELAYED RELEASE ORAL DAILY
Qty: 30 TABLET | Refills: 0 | Status: SHIPPED | OUTPATIENT
Start: 2025-06-14 | End: 2025-07-14

## 2025-06-14 RX ORDER — SODIUM CHLORIDE 9 MG/ML
40 INJECTION, SOLUTION INTRAVENOUS AS NEEDED
Status: DISCONTINUED | OUTPATIENT
Start: 2025-06-14 | End: 2025-06-16 | Stop reason: HOSPADM

## 2025-06-14 RX ORDER — HYDROCODONE BITARTRATE AND ACETAMINOPHEN 5; 325 MG/1; MG/1
1 TABLET ORAL EVERY 6 HOURS PRN
Qty: 20 TABLET | Refills: 0 | Status: SHIPPED | OUTPATIENT
Start: 2025-06-14 | End: 2025-06-19

## 2025-06-14 RX ORDER — L. ACIDOPHILUS/L.BULGARICUS 1MM CELL
1 TABLET ORAL DAILY
Qty: 30 TABLET | Refills: 0 | Status: SHIPPED | OUTPATIENT
Start: 2025-06-14 | End: 2025-07-14

## 2025-06-14 RX ORDER — SODIUM CHLORIDE 0.9 % (FLUSH) 0.9 %
10 SYRINGE (ML) INJECTION EVERY 12 HOURS SCHEDULED
OUTPATIENT
Start: 2025-06-14

## 2025-06-14 RX ORDER — ASPIRIN 81 MG/1
81 TABLET ORAL EVERY 12 HOURS SCHEDULED
Status: DISCONTINUED | OUTPATIENT
Start: 2025-06-14 | End: 2025-06-16 | Stop reason: HOSPADM

## 2025-06-14 RX ORDER — AMOXICILLIN 250 MG
2 CAPSULE ORAL DAILY PRN
Qty: 60 TABLET | Refills: 0 | Status: SHIPPED | OUTPATIENT
Start: 2025-06-14 | End: 2025-07-14

## 2025-06-14 RX ORDER — SODIUM CHLORIDE 0.9 % (FLUSH) 0.9 %
20 SYRINGE (ML) INJECTION AS NEEDED
OUTPATIENT
Start: 2025-06-14

## 2025-06-14 RX ORDER — ACETAMINOPHEN 500 MG
1000 TABLET ORAL EVERY 8 HOURS PRN
Qty: 60 TABLET | Refills: 0 | Status: SHIPPED | OUTPATIENT
Start: 2025-06-14 | End: 2025-06-24

## 2025-06-14 RX ORDER — SODIUM CHLORIDE 0.9 % (FLUSH) 0.9 %
20 SYRINGE (ML) INJECTION AS NEEDED
Status: DISCONTINUED | OUTPATIENT
Start: 2025-06-14 | End: 2025-06-16 | Stop reason: HOSPADM

## 2025-06-14 RX ORDER — SODIUM CHLORIDE 0.9 % (FLUSH) 0.9 %
10 SYRINGE (ML) INJECTION AS NEEDED
Status: DISCONTINUED | OUTPATIENT
Start: 2025-06-14 | End: 2025-06-16 | Stop reason: HOSPADM

## 2025-06-14 RX ORDER — SODIUM CHLORIDE 0.9 % (FLUSH) 0.9 %
10 SYRINGE (ML) INJECTION AS NEEDED
OUTPATIENT
Start: 2025-06-14

## 2025-06-14 RX ORDER — SODIUM CHLORIDE 0.9 % (FLUSH) 0.9 %
10 SYRINGE (ML) INJECTION EVERY 12 HOURS SCHEDULED
Status: DISCONTINUED | OUTPATIENT
Start: 2025-06-14 | End: 2025-06-16 | Stop reason: HOSPADM

## 2025-06-14 RX ADMIN — CEFTRIAXONE 2000 MG: 2 INJECTION, POWDER, FOR SOLUTION INTRAMUSCULAR; INTRAVENOUS at 11:30

## 2025-06-14 RX ADMIN — SENNOSIDES AND DOCUSATE SODIUM 2 TABLET: 50; 8.6 TABLET ORAL at 10:12

## 2025-06-14 RX ADMIN — PANTOPRAZOLE SODIUM 40 MG: 40 INJECTION, POWDER, FOR SOLUTION INTRAVENOUS at 18:03

## 2025-06-14 RX ADMIN — HYDROCODONE BITARTRATE AND ACETAMINOPHEN 1 TABLET: 5; 325 TABLET ORAL at 21:15

## 2025-06-14 RX ADMIN — Medication 10 ML: at 22:52

## 2025-06-14 RX ADMIN — Medication 10 ML: at 12:34

## 2025-06-14 RX ADMIN — HYDROCODONE BITARTRATE AND ACETAMINOPHEN 1 TABLET: 5; 325 TABLET ORAL at 08:59

## 2025-06-14 RX ADMIN — VANCOMYCIN HYDROCHLORIDE 1000 MG: 1 INJECTION, POWDER, LYOPHILIZED, FOR SOLUTION INTRAVENOUS at 18:04

## 2025-06-14 RX ADMIN — POLYETHYLENE GLYCOL 3350 17 G: 17 POWDER, FOR SOLUTION ORAL at 10:11

## 2025-06-14 RX ADMIN — VANCOMYCIN HYDROCHLORIDE 1250 MG: 1.25 INJECTION, POWDER, LYOPHILIZED, FOR SOLUTION INTRAVENOUS at 00:40

## 2025-06-14 RX ADMIN — ASPIRIN 81 MG: 81 TABLET, COATED ORAL at 11:30

## 2025-06-14 RX ADMIN — PANTOPRAZOLE SODIUM 40 MG: 40 INJECTION, POWDER, FOR SOLUTION INTRAVENOUS at 05:54

## 2025-06-14 RX ADMIN — Medication 10 ML: at 10:15

## 2025-06-14 NOTE — DISCHARGE INSTRUCTIONS
Notify your primary care provider if you experience chest pain, difficulty breathing, fevers/chills, nausea or vomiting, bleeding in stool, excessive diarrhea, numbness or weakness or tingling in any part of your body.    Needs repeat CBC in  5-7 days to monitor hemoglobin

## 2025-06-14 NOTE — PROGRESS NOTES
"[facility] Clinical Pharmacy Services: Vancomycin Monitoring Note    Linda Castano is a 54 y.o. female who is on day 4 of pharmacy to dose vancomycin for Skin and Soft Tissue through 7/9/25    Previous Vancomycin Dose:   1250 mg IV every  12  hours  Updated Cultures and Sensitivities:   6/10 Bcx x2: ngx2d  6/11 BFCx (hip aspirate): 1 + WBCs  ngx2d    Results from last 7 days   Lab Units 06/14/25  1220 06/12/25  1353   VANCOMYCIN TR mcg/mL 18.10 11.20     Vitals/Labs  Ht: 157.5 cm (62\"); Wt: 94.3 kg (207 lb 14.3 oz)   Temp Readings from Last 1 Encounters:   06/14/25 98.6 °F (37 °C) (Oral)     Estimated Creatinine Clearance: 127.5 mL/min (A) (by C-G formula based on SCr of 0.54 mg/dL (L)).        Results from last 7 days   Lab Units 06/14/25  0540 06/13/25  0418 06/12/25  0225 06/11/25  0734   CREATININE mg/dL 0.54* 0.94 0.74  0.77 0.72   WBC 10*3/mm3 8.19 9.59  --  10.60     Assessment/Plan  VT was 18.1 ~12h after dose was given. This is therapeutic however on higher end of normal range. Patient kidney function is improving but expect some accumulation with BMI 38. Will go ahead and decrease dose and get a new level once at steady state.   Current Vancomycin Dose: 1250 mg IV every  12  hours; provides a predicted  mg/L.hr   New vancomycin dose 1000mg IV every 12 hours; provides a predicted   Next Level Date and Time: Vanc Trough on 6/17 at 0500  We will continue to monitor patient changes and renal function     Thank you for involving pharmacy in this patient's care. Please contact pharmacy with any questions or concerns.       Patricia Abdullahi, PharmD  Clinical Pharmacist        "

## 2025-06-14 NOTE — SIGNIFICANT NOTE
"   06/14/25 1228   PICC Single Lumen 06/14/25 Right Basilic   Placement date: If unknown, DO NOT use \"Add Comment\" note/Placement time: If unknown, DO NOT use \"Add Comment\" note: 06/14/25 1227   Hand Hygiene Completed: Yes  Size (Fr): 4  Description (optional): Lot#[id number]  EXP:12-  Length (cm): 40 cm  O...   Site Assessment Clean;Dry;Intact   #1 Lumen Status Blood return noted;Capped;Flushed;Normal saline locked   Length yessy (cm) 40 cm   Line Care Connections checked and tightened   Extremity Circumference (cm) 37 cm   Dressing Type Border Dressing;Securing device;Antimicrobial dressing/disc   Dressing Status Clean;Dry;Intact   Dressing Intervention New dressing   Dressing Change Due 06/21/25   Indication/Daily Review of Necessity long-term IV access >7 days     Sharp Count Correct 3 needles, 2 guidewires, and 1 scalpel      "

## 2025-06-14 NOTE — PROGRESS NOTES
LOS: 3 days     Chief Complaint: Follow-up septic arthritis of the left hip-culture-negative    Interval History: No acute events.  She reports ongoing dull left hip pain.  She is tolerating Vanco and ceftriaxone without rash or diarrhea.  No fever or chills.    Medications:    Current Facility-Administered Medications:     acetaminophen (TYLENOL) tablet 650 mg, 650 mg, Oral, Q4H PRN **OR** acetaminophen (TYLENOL) 160 MG/5ML oral solution 650 mg, 650 mg, Oral, Q4H PRN **OR** acetaminophen (TYLENOL) suppository 650 mg, 650 mg, Rectal, Q4H PRN, Ammon Aly MD    acetaminophen (TYLENOL) tablet 325 mg, 325 mg, Oral, Q4H PRN, Ammon Aly MD    [Held by provider] aspirin EC tablet 81 mg, 81 mg, Oral, Q12H, Ammon Aly MD, 81 mg at 06/13/25 0852    sennosides-docusate (PERICOLACE) 8.6-50 MG per tablet 2 tablet, 2 tablet, Oral, BID PRN **AND** polyethylene glycol (MIRALAX) packet 17 g, 17 g, Oral, Daily PRN **AND** bisacodyl (DULCOLAX) EC tablet 5 mg, 5 mg, Oral, Daily PRN **AND** bisacodyl (DULCOLAX) suppository 10 mg, 10 mg, Rectal, Daily PRN, Ammon Aly MD    bisacodyl (DULCOLAX) suppository 10 mg, 10 mg, Rectal, Daily PRN, Ammon Aly MD    calcium carbonate (TUMS) chewable tablet 500 mg (200 mg elemental), 2 tablet, Oral, BID PRN, Ammon Aly MD    cefTRIAXone (ROCEPHIN) 2,000 mg in sodium chloride 0.9 % 100 mL MBP, 2,000 mg, Intravenous, Q24H, Farooq Chang MD    [Held by provider] docusate sodium (COLACE) capsule 100 mg, 100 mg, Oral, BID, Ammon Aly MD, 100 mg at 06/13/25 0852    HYDROcodone-acetaminophen (NORCO) 5-325 MG per tablet 1 tablet, 1 tablet, Oral, Q6H PRN, Ammon Aly MD, 1 tablet at 06/14/25 0859    ketorolac (TORADOL) injection 15 mg, 15 mg, Intravenous, Q6H PRN, Ammon Aly MD    magnesium hydroxide (MILK OF MAGNESIA) suspension 10 mL, 10 mL, Oral, Daily PRN, Ammon Aly MD    [Held by provider] meloxicam (MOBIC) tablet 15 mg,  15 mg, Oral, Daily, Ammon Aly MD, 15 mg at 06/13/25 0852    morphine injection 2 mg, 2 mg, Intravenous, Q3H PRN, Ammon Aly MD, 2 mg at 06/11/25 0807    ondansetron ODT (ZOFRAN-ODT) disintegrating tablet 4 mg, 4 mg, Oral, Q6H PRN **OR** ondansetron (ZOFRAN) injection 4 mg, 4 mg, Intravenous, Q6H PRN, Ammon Aly MD    ondansetron ODT (ZOFRAN-ODT) disintegrating tablet 4 mg, 4 mg, Oral, Q6H PRN **OR** ondansetron (ZOFRAN) injection 4 mg, 4 mg, Intravenous, Q6H PRN, Ammon Aly MD    pantoprazole (PROTONIX) injection 40 mg, 40 mg, Intravenous, BID AC, Dustin Cerna MD, 40 mg at 06/14/25 0554    Pharmacy to dose vancomycin, , Not Applicable, Continuous PRN, Farooq Chang MD    [Held by provider] polyethylene glycol (MIRALAX) packet 17 g, 17 g, Oral, BID, Ammon Aly MD, 17 g at 06/12/25 1018    sodium chloride 0.9 % flush 10 mL, 10 mL, Intravenous, Q12H, Ammon Aly MD, 10 mL at 06/13/25 2225    sodium chloride 0.9 % flush 10 mL, 10 mL, Intravenous, PRN, Ammon Aly MD    sodium chloride 0.9 % infusion 40 mL, 40 mL, Intravenous, PRN, Ammon Aly MD    Vancomycin HCl 1,250 mg in sodium chloride 0.9 % 250 mL VTB, 1,250 mg, Intravenous, Q12H, Aravind Ellsworth, DO, Last Rate: 200 mL/hr at 06/14/25 0040, 1,250 mg at 06/14/25 0040      Vital Signs  Temp:  [98.6 °F (37 °C)-99.1 °F (37.3 °C)] 98.6 °F (37 °C)  Heart Rate:  [74-85] 80  Resp:  [18] 18  BP: (131-140)/(59-73) 131/73    Physical Exam:  General: NAD, very nice, sitting up in chair  ENT: No scleral icterus  Respiratory: Normal work of breathing on room air without wheezing  Skin: No rashes or lesions in exposed areas  MSK: Left hip bandaged  Access: Peripheral IV in RUE without erythema.         Results Review:    CBC, BMP, CRP, Vanco level, and operative cultures reviewed today    Lab Results   Component Value Date    WBC 8.19 06/14/2025    HGB 8.5 (L) 06/14/2025    HCT 26.2 (L) 06/14/2025     MCV 87.0 06/14/2025     06/14/2025     Lab Results   Component Value Date    GLUCOSE 96 06/14/2025    BUN 13.0 06/14/2025    CREATININE 0.54 (L) 06/14/2025    EGFRIFAFRI >60 12/15/2020    BCR 24.1 06/14/2025    CO2 26.2 06/14/2025    CALCIUM 8.2 (L) 06/14/2025    ALBUMIN 3.0 (L) 06/14/2025    LABIL2 1.3 12/15/2020    AST 20 06/14/2025    ALT 38 (H) 06/14/2025     Lab Results   Component Value Date    CRP 5.78 (H) 06/11/2025       Lab Results   Component Value Date    VANCOTROUGH 11.20 06/12/2025        Microbiology:  Microbiology Results (last 10 days)       Procedure Component Value - Date/Time    Anaerobic Culture - Tissue, Hip, Left [[id number]]  (Normal) Collected: 06/11/25 1550    Lab Status: Preliminary result Specimen: Tissue from Hip, Left Updated: 06/14/25 0759     Anaerobic Culture No anaerobes isolated at 3 days    Tissue / Bone Culture - Tissue, Hip, Left [[id number]] Collected: 06/11/25 1550    Lab Status: Final result Specimen: Tissue from Hip, Left Updated: 06/14/25 0647     Tissue Culture No growth at 3 days     Gram Stain Moderate (3+) WBCs seen      No organisms seen    Anaerobic Culture - Surgical Site, Hip, Left [[id number]]  (Normal) Collected: 06/11/25 1549    Lab Status: Preliminary result Specimen: Surgical Site from Hip, Left Updated: 06/14/25 0759     Anaerobic Culture No anaerobes isolated at 3 days    Wound Culture - Surgical Site, Hip, Left [[id number]] Collected: 06/11/25 1549    Lab Status: Final result Specimen: Surgical Site from Hip, Left Updated: 06/14/25 0648     Wound Culture No growth at 3 days     Gram Stain Few (2+) WBCs seen      No organisms seen    Anaerobic Culture - Surgical Site, Hip, Left [[id number]]  (Normal) Collected: 06/11/25 1547    Lab Status: Preliminary result Specimen: Surgical Site from Hip, Left Updated: 06/14/25 0759     Anaerobic Culture No anaerobes isolated at 3 days    Wound Culture - Surgical Site, Hip, Left [[id number]] Collected: 06/11/25 0919     Lab Status: Final result Specimen: Surgical Site from Hip, Left Updated: 06/14/25 0648     Wound Culture No growth at 3 days     Gram Stain Moderate (3+) WBCs seen      No organisms seen    Body Fluid Culture - Aspirate, Hip, Left [[id number]] Collected: 06/11/25 0856    Lab Status: Preliminary result Specimen: Aspirate from Hip, Left Updated: 06/14/25 0648     Body Fluid Culture No growth at 3 days     Gram Stain Rare (1+) WBCs seen      No organisms seen    Anaerobic Culture - Swab, Hip, Left [[id number]]  (Normal) Collected: 06/11/25 0856    Lab Status: Preliminary result Specimen: Swab from Hip, Left Updated: 06/14/25 0759     Anaerobic Culture No anaerobes isolated at 3 days    Blood Culture - Blood, Arm, Right [[id number]]  (Normal) Collected: 06/10/25 2359    Lab Status: Preliminary result Specimen: Blood from Arm, Right Updated: 06/14/25 0015     Blood Culture No growth at 3 days    Blood Culture - Blood, Arm, Left [[id number]]  (Normal) Collected: 06/10/25 2353    Lab Status: Preliminary result Specimen: Blood from Arm, Left Updated: 06/14/25 0015     Blood Culture No growth at 3 days           New radiology:  MRI left hip shows a moderate to large left hip effusion and a near full-thickness tear of the left ligamentum teres per my review of the radiology report    Isolation:   No active isolations    Assessment      #Left hip native septic arthritis  #Acute left hip pain  #Obesity BMI 38-complicating above    On 6/11/2025, she went to the operating room with orthopedic surgery for debridement of the left hip.  Operative aspiration cultures are negative to date.  I recommend that she be treated with vancomycin 1250 mg IV every 12 hours with goal -600 and ceftriaxone 2 g IV every 24 hours x 4 weeks with stop date of 7/11/2025 at which time she will follow-up with Dr. Ellsworth in the infectious diseases clinic.  I will go ahead and order a single-lumen PICC line today.  She will need weekly CBC with  differential, creatinine, and CRP faxed to Dr. Ellsworth at [phone].    Antibiotic recommendations discussed with her primary attending.    No objection to discharge from an infectious diseases standpoint once her PICC line is in and home antibiotics are arranged.

## 2025-06-14 NOTE — PROGRESS NOTES
Name: Linda Castano ADMIT: 6/10/2025   : 1970  PCP: Sintia Kemp APRN    MRN: [id number] LOS: 3 days   AGE/SEX: 54 y.o. female  ROOM: [facility]     Subjective   Subjective   Patient seen this morning.  No acute events overnight.  Sitting up in the chair, eating breakfast.  Hip pain stable.  Tolerating antibiotics.  No fevers no chills.  Having BM, no signs of bleeding/dark stool reported    Review of Systems   As above  Objective   Objective   Vital Signs  Temp:  [98.6 °F (37 °C)-99.1 °F (37.3 °C)] 98.6 °F (37 °C)  Heart Rate:  [75-85] 80  Resp:  [18] 18  BP: (131-132)/(59-73) 131/73  SpO2:  [95 %-100 %] 98 %  on   ;   Device (Oxygen Therapy): room air  Body mass index is 38.02 kg/m².  Physical Exam    General: Alert, sitting up in the chair, not in distress,  HEENT: Normocephalic, atraumatic  CV: Regular rate and rhythm, no murmurs rubs or gallops  Lungs: Clear to auscultation bilaterally, no crackles or wheezes  Abdomen: Soft, nontender, nondistended  Extremities: Left hip dressing in place, swollen.  No obvious signs of significant hematoma    Results Review     I reviewed the patient's new clinical results.  Results from last 7 days   Lab Units 25  1220 25  0540 25  1209 25  0418 25  0734 06/10/25  2243   WBC 10*3/mm3  --  8.19  --  9.59  --  10.60 10.56   HEMOGLOBIN g/dL 9.8* 8.5* 9.5* 8.5*   < > 10.3* 11.2*   PLATELETS 10*3/mm3  --  283  --  251  --  262 305    < > = values in this interval not displayed.     Results from last 7 days   Lab Units 25  0540 25  0418 255 25  0734   SODIUM mmol/L 140 144 138 138   POTASSIUM mmol/L 4.3 4.9 4.4 4.1   CHLORIDE mmol/L 106 109* 108* 107   CO2 mmol/L 26.2 25.0 20.8* 24.0   BUN mg/dL 13.0 15.0 13.0 12.0   CREATININE mg/dL 0.54* 0.94 0.74  0.77 0.72   GLUCOSE mg/dL 96 97 180* 95   Estimated Creatinine Clearance: 127.5 mL/min (A) (by C-G formula based on SCr of 0.54 mg/dL  "(L)).  Results from last 7 days   Lab Units 06/14/25  0540 06/13/25  0418 06/10/25  2243   ALBUMIN g/dL 3.0* 2.9* 4.0   BILIRUBIN mg/dL <0.2 <0.2 0.4   ALK PHOS U/L 103 107 133*   AST (SGOT) U/L 20 31 27   ALT (SGPT) U/L 38* 41* 37*     Results from last 7 days   Lab Units 06/14/25  0540 06/13/25  0418 06/12/25  0225 06/11/25  0734 06/10/25  2243   CALCIUM mg/dL 8.2* 8.3* 8.2* 8.5* 9.1   ALBUMIN g/dL 3.0* 2.9*  --   --  4.0   MAGNESIUM mg/dL 2.0  --   --   --   --    PHOSPHORUS mg/dL 4.0  --   --   --   --      Results from last 7 days   Lab Units 06/10/25  2353   LACTATE mmol/L 0.8   No results found for: \"COVID19\"  No results found for: \"HGBA1C\", \"POCGLU\"        FL Guided Aspiration Joint  Narrative: LEFT HIP FLUOROSCOPICALLY GUIDED JOINT ASPIRATION, 6/11/2025     HISTORY:  54-year-old female with left hip pain. MRI demonstrates moderate to  large left hip effusion. Rule out septic joint.     CONSENT:  Procedure, risks and alternatives were discussed in detail with the  patient, including the low risk of allergy and infection; patient  questions were answered, and informed consent was obtained. Timeout was  observed in the procedure room for patient identification and procedure  site verification, and the site was marked by me.     PROCEDURE:  *  Reference Air Kerma: 2.4 mGy     Using sterile technique, 1% lidocaine local anesthetic and fluoroscopic  guidance, a 22-gauge spinal needle was placed into the right hip joint  capsule without difficulty. Aspiration of approximately 12 mL of cloudy  viscous yellow/orange-colored fluid. The needle was removed and manual  pressure was applied achieving adequate hemostasis. A Band-Aid was  applied. Specimen was sent to laboratory as ordered by the requesting  provider. The patient tolerated the procedure well without incident.        Impression: Technically successful left hip joint aspiration with removal of  approximately 12 mL of cloudy viscous yellow/orange-colored " fluid.        Procedure performed by ANABELA Blake.  By electronically signing this report, I, the supervising radiologist,  attest that I was not present for the procedure(s) but agree with the  final edited report.     This report was finalized on 6/11/2025 10:55 AM by Dr. Chandra Christie M.D on Workstation: [id number]     MRI Hip Left Without Contrast  Narrative: MRI HIP LEFT WO CONTRAST-     6/11/2025 4:00 AM     INDICATION: acute left hip pain, suspect effusion on CT, possible septic  arthritis.     COMPARISON: CT abdomen pelvis 6/10/2025.     TECHNIQUE: Multiplanar, multisequence MR imaging of the pelvis and hip  was performed without the intravenous administration of contrast.     FINDINGS:  SOFT TISSUES: Mild patchy soft tissue edema anterior and lateral to the  proximal left femur and in the left adductor compartment. No cystic or  solid soft tissue mass. No abnormal bursal fluid collection. No  pathologically enlarged lymph nodes. No well-formed or drainable fluid  collection to suggest abscess. The sciatic nerves are unremarkable as  imaged.     BONES: Tiny likely degenerative subchondral cystic changes at the  anterior/superior left acetabulum. No acute fracture. No concerning bone  marrow lesion or marrow replacing process.     JOINTS: Moderate to large left hip joint effusion. Mild left hip joint  chondromalacia with full-thickness or near full-thickness chondral  fissuring at the anterior/superior left acetabulum. The articular  cartilage in the contralateral (right) hip joint space is fairly well  maintained on the wide field-of-view sequences. Full-thickness or near  full-thickness tear of the left ligamentum teres. Mild DJD at the pubic  symphysis. Mild bilateral SI joint DJD. Partially imaged lumbosacral  spondylosis.     LABRUM: Likely degenerative tear of the anterior/superior left  acetabular labrum. Tiny 0.5 cm associated anterior/superior left  paralabral cyst.     MUSCLES AND TENDONS:  Mild edema throughout the left adductor muscles and  in the left tensor fascia ana muscle. The anterior muscles and tendons  are otherwise intact. The gluteal tendons are intact. Significant  muscular fatty atrophy. The proximal hamstring tendons are intact.     Impression:    1. Nonspecific moderate to large left hip joint effusion with mild soft  tissue edema along the anterior and lateral proximal left femur.  Correlate with ESR and CRP and possible joint fluid analysis if there is  clinical concern for septic arthritis.  2. Full-thickness or near full-thickness tear of the left ligamentum  teres.  3. Mild edema in the left abductor muscles in the left abductor  compartment, which could represent mild muscle strain or nonspecific  myositis.        This report was finalized on 6/11/2025 7:31 AM by Oleksandr Wyatt MD on  Workstation: [id number]     CT Abdomen Pelvis With Contrast  Addendum: Patient does have a left hip effusion. No aggressive osseous   abnormalities are seen.       This report was finalized on 6/11/2025 12:58 AM by Dr. Evelyn Carlos M.D on Workstation: [id number]  Narrative: CT OF THE ABDOMEN PELVIS WITH CONTRAST     HISTORY: Left flank pain and fever     COMPARISON: None available.     TECHNIQUE: Axial CT imaging was obtained through the abdomen and pelvis.  IV contrast was administered.     FINDINGS:  The patient is status post closure of a patent foramen ovale. Images  through the lung bases are degraded by motion artifact. There is a  potential nodule within the left lower lobe, measuring 5 mm. No  suspicious hepatic lesions are seen. The stomach, adrenal glands,  spleen, pancreas, and gallbladder all appear normal there is a duodenal  diverticulum.. The kidneys enhance symmetrically. There is no  hydronephrosis. There are bilateral parapelvic cysts. Uterus appears to  normal. No adnexal masses are seen. No distal ureteral or bladder stones  are seen. There is colonic diverticulosis.  There is no bowel  obstruction. The appendix is normal. No acute osseous abnormalities are  seen.     Impression: 1. No acute intra-abdominal or intrapelvic process seen.     2. Indeterminate solid pulmonary nodule measuring 5 mm. In a low-risk  patient with a solid nodule <6 mm, recommend no follow-up. In a  high-risk patient, CT at 12 months is optional with stronger  consideration if there is suspicious nodule morphology and/or upper lobe  location.     Radiation dose reduction techniques were utilized, including automated  exposure control and exposure modulation based on body size.        This report was finalized on 6/11/2025 12:12 AM by Dr. Evelyn Carlos M.D on Workstation: [id number]       Scheduled Medications  aspirin, 81 mg, Oral, Q12H  cefTRIAXone, 2,000 mg, Intravenous, Q24H  [Held by provider] docusate sodium, 100 mg, Oral, BID  [Held by provider] meloxicam, 15 mg, Oral, Daily  pantoprazole, 40 mg, Intravenous, BID AC  [Held by provider] polyethylene glycol, 17 g, Oral, BID  sodium chloride, 10 mL, Intravenous, Q12H  sodium chloride, 10 mL, Intravenous, Q12H  vancomycin, 1,000 mg, Intravenous, Q12H    Infusions  Pharmacy to dose vancomycin,     Diet  Diet: Regular/House; Texture: Regular (IDDSI 7); Fluid Consistency: Thin (IDDSI 0)    I have personally reviewed     [x]  Laboratory   [x]  Microbiology   [x]  Radiology   [x]  EKG/Telemetry  [x]  Cardiology/Vascular   []  Pathology    []  Records       Assessment/Plan     Active Hospital Problems    Diagnosis  POA    **Acute hip pain, left [M25.552]  Yes    Septic hip [M00.9]  Unknown    S/P patent foramen ovale closure [Z87.74]  Not Applicable      Resolved Hospital Problems   No resolved problems to display.       54-year-old female with history of prior CVA with PFO s/p closure on aspirin, presented to the ED complaining of hospital because of worsening left hip pain.    Left hip septic arthritis  -Status post irrigation and debridement  06/11/2025 per orthopedic surgery.  - Cultures negative to date.  -On vancomycin and ceftriaxone,   - ID evaluated  4 weeks of antibiotics recommended to stop date of 07/11/2025 at which time she will follow-up with Dr. Ellsworth in the infectious diseases clinic.   - PICC line ordered        Anemia  - Hemoglobin close 11.2 on admission, gradually trended down to as low as 8.1  - No signs of active bleeding  -Likely partly related secondary to blood loss anemia due to surgery however would not expect to see significant drop.  - Was initiated on aspirin 81 twice daily postsurgery for DVT prophylaxis, hold for now  - V PPI, hold meloxicam.    - Hemoccult stool positive  - Hemoglobin stable  - Consult GI    History of CVA status with PFO s/p PFO closure  -Completed Plavix, takes aspirin 81 mg daily.  - Follows with cardiology outpatient  - Continue low-dose aspirin    SCDs for DVT prophylaxis.  Full code.  Discussed with patient.  Disposition: Home with home health, likely on Monday once home health for antibiotic infusion set up and pending GI evaluation.  Expected Discharge Date: 6/16/2025; Expected Discharge Time:        Copied text in this note has been reviewed and is accurate as of 06/14/25.         Dictated utilizing Dragon dictation        Dustin Cerna MD  [facility]  06/14/25  15:15 EDT

## 2025-06-14 NOTE — NURSING NOTE
IV team consulted for PICC line placement for discharge Ivabx thru 7/11.  Primary RN will contact IV team when consent and behavorial contract has been signed.  IV team to follow.

## 2025-06-14 NOTE — PLAN OF CARE
Goal Outcome Evaluation:                            [x]  Stable   []  Watcher  []  Unstable  Reason for admission: Left hip pain and fever since Thursday of last week, nausea and abdominal pain  Significant PMH: HTN, Hyperlipidemia, PFO, Loop recorder   Significant 24 hour events: PICC line place consent was signed [facility] health and Home health set up for DC home Monday     Significant Assessment Findings: continue on vancomycin and rocephin     Social:  at bedside  Additional Info: Fluid aspiration 6/10 with concern for infection--  POD 3 I&D of L hip infection this afternoon.    Mobility Plan: Assist x1 to bathroom

## 2025-06-14 NOTE — CASE MANAGEMENT/SOCIAL WORK
Continued Stay Note  [facility]     Patient Name: Linda Castano  MRN: [id number]  Today's Date: 6/14/2025    Admit Date: 6/10/2025    Plan: Home w/ IV antibiotics- [facility] on Monday?   Discharge Plan       Row Name 06/14/25 1453       Plan    Plan Home w/ IV antibiotics- [facility] on Monday?    Plan Comments CCP notified that pt is discharging and needs IV antibiotics confirmed. CCP called [facility] who reports that they are unable to make the IV antibiotics this late in the day and get them to the patient, offices are closed on sunday so [facility] will follow up with the patient on monday to do the teaching and deliver antibiotics. RN updated                   Discharge Codes    No documentation.                 Expected Discharge Date and Time       Expected Discharge Date Expected Discharge Time    Jun 14, 2025               Celine Andrew RN

## 2025-06-14 NOTE — PLAN OF CARE
Goal Outcome Evaluation:              Outcome Evaluation: Pt is AOX4. Pt is on RA. Pt is assist x1 to the bathroom. Pt has PRN pain meds available. CTM.

## 2025-06-14 NOTE — DISCHARGE PLACEMENT REQUEST
"Linda Castano (54 y.o. Female)       Date of Birth   1970    Social Security Number       Address   [address] [place] [address]    Home Phone   [phone]    MRN   [id number]       Congregational   Rastafari    Marital Status                               Admission Date   6/10/2025    Admission Type   Emergency    Admitting Provider   Nik Galvin MD    Attending Provider   Dustin Cerna MD    Department, Room/Bed   [address], P792/1       Discharge Date       Discharge Disposition   Home-Health Care [facility]    Discharge Destination                                 Attending Provider: Dustin Cerna MD    Allergies: Sulfa Antibiotics    Isolation: None   Infection: None   Code Status: CPR    Ht: 157.5 cm (62\")   Wt: 94.3 kg (207 lb 14.3 oz)    Admission Cmt: None   Principal Problem: Acute hip pain, left [M25.552]                   Active Insurance as of 6/10/2025       Primary Coverage       Payor Plan Insurance Group Employer/Plan Group    KRYS MARCANO [id number]       Payor Plan Address Payor Plan Phone Number Payor Plan Fax Number Effective Dates    [address] [phone]  1/1/2025 - None Entered    [facility] [id number]         Subscriber Name Subscriber Birth Date Member ID       Nik Castano 1970 [id number]                     Emergency Contacts        (Rel.) Home Phone Work Phone Mobile Phone    Derek Castano (Spouse) [phone] -- --                "

## 2025-06-15 ENCOUNTER — APPOINTMENT (OUTPATIENT)
Dept: CARDIOLOGY | Facility: HOSPITAL | Age: 55
End: 2025-06-15
Payer: COMMERCIAL

## 2025-06-15 LAB
ALBUMIN SERPL-MCNC: 3.1 G/DL (ref 3.5–5.2)
ALBUMIN/GLOB SERPL: 1.2 G/DL
ALP SERPL-CCNC: 119 U/L (ref 39–117)
ALT SERPL W P-5'-P-CCNC: 36 U/L (ref 1–33)
ANION GAP SERPL CALCULATED.3IONS-SCNC: 9 MMOL/L (ref 5–15)
AST SERPL-CCNC: 24 U/L (ref 1–32)
BASOPHILS # BLD AUTO: 0.05 10*3/MM3 (ref 0–0.2)
BASOPHILS NFR BLD AUTO: 0.6 % (ref 0–1.5)
BH CV UPPER VENOUS LEFT INTERNAL JUGULAR AUGMENT: NORMAL
BH CV UPPER VENOUS LEFT INTERNAL JUGULAR COMPRESS: NORMAL
BH CV UPPER VENOUS LEFT INTERNAL JUGULAR PHASIC: NORMAL
BH CV UPPER VENOUS LEFT INTERNAL JUGULAR SPONT: NORMAL
BH CV UPPER VENOUS LEFT SUBCLAVIAN AUGMENT: NORMAL
BH CV UPPER VENOUS LEFT SUBCLAVIAN COMPRESS: NORMAL
BH CV UPPER VENOUS LEFT SUBCLAVIAN PHASIC: NORMAL
BH CV UPPER VENOUS LEFT SUBCLAVIAN SPONT: NORMAL
BH CV UPPER VENOUS RIGHT AXILLARY AUGMENT: NORMAL
BH CV UPPER VENOUS RIGHT AXILLARY COMPRESS: NORMAL
BH CV UPPER VENOUS RIGHT AXILLARY PHASIC: NORMAL
BH CV UPPER VENOUS RIGHT AXILLARY SPONT: NORMAL
BH CV UPPER VENOUS RIGHT BASILIC FOREARM COMPRESS: NORMAL
BH CV UPPER VENOUS RIGHT BASILIC UPPER COMPRESS: NORMAL
BH CV UPPER VENOUS RIGHT BRACHIAL COMPRESS: NORMAL
BH CV UPPER VENOUS RIGHT CEPHALIC FOREARM COMPRESS: NORMAL
BH CV UPPER VENOUS RIGHT CEPHALIC UPPER COMPRESS: NORMAL
BH CV UPPER VENOUS RIGHT INTERNAL JUGULAR AUGMENT: NORMAL
BH CV UPPER VENOUS RIGHT INTERNAL JUGULAR COMPRESS: NORMAL
BH CV UPPER VENOUS RIGHT INTERNAL JUGULAR PHASIC: NORMAL
BH CV UPPER VENOUS RIGHT INTERNAL JUGULAR SPONT: NORMAL
BH CV UPPER VENOUS RIGHT RADIAL COMPRESS: NORMAL
BH CV UPPER VENOUS RIGHT SUBCLAVIAN AUGMENT: NORMAL
BH CV UPPER VENOUS RIGHT SUBCLAVIAN COMPRESS: NORMAL
BH CV UPPER VENOUS RIGHT SUBCLAVIAN PHASIC: NORMAL
BH CV UPPER VENOUS RIGHT SUBCLAVIAN SPONT: NORMAL
BH CV UPPER VENOUS RIGHT ULNAR COMPRESS: NORMAL
BILIRUB SERPL-MCNC: <0.2 MG/DL (ref 0–1.2)
BUN SERPL-MCNC: 12 MG/DL (ref 6–20)
BUN/CREAT SERPL: 17.9 (ref 7–25)
CALCIUM SPEC-SCNC: 8.2 MG/DL (ref 8.6–10.5)
CHLORIDE SERPL-SCNC: 109 MMOL/L (ref 98–107)
CO2 SERPL-SCNC: 25 MMOL/L (ref 22–29)
CREAT SERPL-MCNC: 0.67 MG/DL (ref 0.57–1)
DEPRECATED RDW RBC AUTO: 40.9 FL (ref 37–54)
EGFRCR SERPLBLD CKD-EPI 2021: 104 ML/MIN/1.73
EOSINOPHIL # BLD AUTO: 0.36 10*3/MM3 (ref 0–0.4)
EOSINOPHIL NFR BLD AUTO: 4.3 % (ref 0.3–6.2)
ERYTHROCYTE [DISTWIDTH] IN BLOOD BY AUTOMATED COUNT: 13.1 % (ref 12.3–15.4)
GLOBULIN UR ELPH-MCNC: 2.6 GM/DL
GLUCOSE SERPL-MCNC: 95 MG/DL (ref 65–99)
HCT VFR BLD AUTO: 25.9 % (ref 34–46.6)
HGB BLD-MCNC: 8.6 G/DL (ref 12–15.9)
IMM GRANULOCYTES # BLD AUTO: 0.03 10*3/MM3 (ref 0–0.05)
IMM GRANULOCYTES NFR BLD AUTO: 0.4 % (ref 0–0.5)
LYMPHOCYTES # BLD AUTO: 2.51 10*3/MM3 (ref 0.7–3.1)
LYMPHOCYTES NFR BLD AUTO: 30.2 % (ref 19.6–45.3)
MCH RBC QN AUTO: 28.9 PG (ref 26.6–33)
MCHC RBC AUTO-ENTMCNC: 33.2 G/DL (ref 31.5–35.7)
MCV RBC AUTO: 86.9 FL (ref 79–97)
MONOCYTES # BLD AUTO: 0.72 10*3/MM3 (ref 0.1–0.9)
MONOCYTES NFR BLD AUTO: 8.7 % (ref 5–12)
NEUTROPHILS NFR BLD AUTO: 4.65 10*3/MM3 (ref 1.7–7)
NEUTROPHILS NFR BLD AUTO: 55.8 % (ref 42.7–76)
NRBC BLD AUTO-RTO: 0 /100 WBC (ref 0–0.2)
PLATELET # BLD AUTO: 280 10*3/MM3 (ref 140–450)
PMV BLD AUTO: 9 FL (ref 6–12)
POTASSIUM SERPL-SCNC: 4.4 MMOL/L (ref 3.5–5.2)
PROT SERPL-MCNC: 5.7 G/DL (ref 6–8.5)
RBC # BLD AUTO: 2.98 10*6/MM3 (ref 3.77–5.28)
SODIUM SERPL-SCNC: 143 MMOL/L (ref 136–145)
WBC NRBC COR # BLD AUTO: 8.32 10*3/MM3 (ref 3.4–10.8)

## 2025-06-15 PROCEDURE — 80053 COMPREHEN METABOLIC PANEL: CPT | Performed by: STUDENT IN AN ORGANIZED HEALTH CARE EDUCATION/TRAINING PROGRAM

## 2025-06-15 PROCEDURE — 85025 COMPLETE CBC W/AUTO DIFF WBC: CPT | Performed by: HOSPITALIST

## 2025-06-15 PROCEDURE — 25010000002 VANCOMYCIN 1 G RECONSTITUTED SOLUTION 1 EACH VIAL: Performed by: STUDENT IN AN ORGANIZED HEALTH CARE EDUCATION/TRAINING PROGRAM

## 2025-06-15 PROCEDURE — 93971 EXTREMITY STUDY: CPT | Performed by: SURGERY

## 2025-06-15 PROCEDURE — 25010000002 MORPHINE PER 10 MG: Performed by: ORTHOPAEDIC SURGERY

## 2025-06-15 PROCEDURE — 93971 EXTREMITY STUDY: CPT

## 2025-06-15 PROCEDURE — 25810000003 SODIUM CHLORIDE 0.9 % SOLUTION 250 ML FLEX CONT: Performed by: STUDENT IN AN ORGANIZED HEALTH CARE EDUCATION/TRAINING PROGRAM

## 2025-06-15 PROCEDURE — 25010000002 CEFTRIAXONE PER 250 MG: Performed by: INTERNAL MEDICINE

## 2025-06-15 PROCEDURE — 99232 SBSQ HOSP IP/OBS MODERATE 35: CPT | Performed by: INTERNAL MEDICINE

## 2025-06-15 RX ADMIN — PANTOPRAZOLE SODIUM 40 MG: 40 INJECTION, POWDER, FOR SOLUTION INTRAVENOUS at 08:06

## 2025-06-15 RX ADMIN — PANTOPRAZOLE SODIUM 40 MG: 40 INJECTION, POWDER, FOR SOLUTION INTRAVENOUS at 18:51

## 2025-06-15 RX ADMIN — Medication 10 ML: at 08:22

## 2025-06-15 RX ADMIN — Medication 10 ML: at 20:10

## 2025-06-15 RX ADMIN — ASPIRIN 81 MG: 81 TABLET, COATED ORAL at 08:21

## 2025-06-15 RX ADMIN — VANCOMYCIN HYDROCHLORIDE 1000 MG: 1 INJECTION, POWDER, LYOPHILIZED, FOR SOLUTION INTRAVENOUS at 18:51

## 2025-06-15 RX ADMIN — Medication 10 ML: at 08:21

## 2025-06-15 RX ADMIN — VANCOMYCIN HYDROCHLORIDE 1000 MG: 1 INJECTION, POWDER, LYOPHILIZED, FOR SOLUTION INTRAVENOUS at 05:51

## 2025-06-15 RX ADMIN — HYDROCODONE BITARTRATE AND ACETAMINOPHEN 1 TABLET: 5; 325 TABLET ORAL at 04:26

## 2025-06-15 RX ADMIN — MORPHINE SULFATE 2 MG: 2 INJECTION, SOLUTION INTRAMUSCULAR; INTRAVENOUS at 22:40

## 2025-06-15 RX ADMIN — ASPIRIN 81 MG: 81 TABLET, COATED ORAL at 20:09

## 2025-06-15 RX ADMIN — CEFTRIAXONE 2000 MG: 2 INJECTION, POWDER, FOR SOLUTION INTRAMUSCULAR; INTRAVENOUS at 11:33

## 2025-06-15 NOTE — PLAN OF CARE
Goal Outcome Evaluation:  Plan of Care Reviewed With: patient        Progress: improving  Outcome Evaluation: POD#4 OF Left Hip I & D . Edmund dressing dry /intact and flashing green. VSS. PO pain medication helping with pain. Ambulating with assistance. Voiding per brp. NPO sips with medication. Patient has a PICC LINE. Education provided on safety and pain control. Patient verbalized understanding. Patient plans to d/c home.

## 2025-06-15 NOTE — PROGRESS NOTES
LOS: 4 days     Chief Complaint: Follow-up septic arthritis of the left hip-culture-negative    Interval History: No acute events.  No fever.  PICC line was placed yesterday without difficulty.  Her hip is stable.  She is tolerating Vanco and ceftriaxone without diarrhea or rash.      Medications:    Current Facility-Administered Medications:     acetaminophen (TYLENOL) tablet 650 mg, 650 mg, Oral, Q4H PRN **OR** acetaminophen (TYLENOL) 160 MG/5ML oral solution 650 mg, 650 mg, Oral, Q4H PRN **OR** acetaminophen (TYLENOL) suppository 650 mg, 650 mg, Rectal, Q4H PRN, Ammon Aly MD    acetaminophen (TYLENOL) tablet 325 mg, 325 mg, Oral, Q4H PRN, Ammon Aly MD    aspirin EC tablet 81 mg, 81 mg, Oral, Q12H, Dustin Cerna MD, 81 mg at 06/15/25 0821    sennosides-docusate (PERICOLACE) 8.6-50 MG per tablet 2 tablet, 2 tablet, Oral, BID PRN, 2 tablet at 06/14/25 1012 **AND** polyethylene glycol (MIRALAX) packet 17 g, 17 g, Oral, Daily PRN, 17 g at 06/14/25 1011 **AND** bisacodyl (DULCOLAX) EC tablet 5 mg, 5 mg, Oral, Daily PRN **AND** bisacodyl (DULCOLAX) suppository 10 mg, 10 mg, Rectal, Daily PRN, Ammon Aly MD    bisacodyl (DULCOLAX) suppository 10 mg, 10 mg, Rectal, Daily PRN, Ammon Aly MD    calcium carbonate (TUMS) chewable tablet 500 mg (200 mg elemental), 2 tablet, Oral, BID PRN, Ammon Aly MD    cefTRIAXone (ROCEPHIN) 2,000 mg in sodium chloride 0.9 % 100 mL MBP, 2,000 mg, Intravenous, Q24H, Farooq Chang MD, Stopped at 06/14/25 1145    [Held by provider] docusate sodium (COLACE) capsule 100 mg, 100 mg, Oral, BID, Ammon Aly MD, 100 mg at 06/13/25 0852    HYDROcodone-acetaminophen (NORCO) 5-325 MG per tablet 1 tablet, 1 tablet, Oral, Q6H PRN, Ammon Aly MD, 1 tablet at 06/15/25 0426    magnesium hydroxide (MILK OF MAGNESIA) suspension 10 mL, 10 mL, Oral, Daily PRN, Ammon Aly MD    [Held by provider] meloxicam (MOBIC) tablet 15 mg, 15 mg,  Oral, Daily, Ammon Aly MD, 15 mg at 06/13/25 0852    morphine injection 2 mg, 2 mg, Intravenous, Q3H PRN, Ammon Aly MD, 2 mg at 06/11/25 0807    ondansetron ODT (ZOFRAN-ODT) disintegrating tablet 4 mg, 4 mg, Oral, Q6H PRN **OR** ondansetron (ZOFRAN) injection 4 mg, 4 mg, Intravenous, Q6H PRN, Ammon Aly MD    ondansetron ODT (ZOFRAN-ODT) disintegrating tablet 4 mg, 4 mg, Oral, Q6H PRN **OR** ondansetron (ZOFRAN) injection 4 mg, 4 mg, Intravenous, Q6H PRN, Ammon Aly MD    pantoprazole (PROTONIX) injection 40 mg, 40 mg, Intravenous, BID , Dustin Cerna MD, 40 mg at 06/15/25 0806    Pharmacy to dose vancomycin, , Not Applicable, Continuous PRN, Farooq Chang MD    [Held by provider] polyethylene glycol (MIRALAX) packet 17 g, 17 g, Oral, BID, Ammon Aly MD, 17 g at 06/12/25 1018    sodium chloride 0.9 % flush 10 mL, 10 mL, Intravenous, Q12H, Ammon Aly MD, 10 mL at 06/15/25 0822    sodium chloride 0.9 % flush 10 mL, 10 mL, Intravenous, PRN, Ammon Aly MD    sodium chloride 0.9 % flush 10 mL, 10 mL, Intravenous, Q12H, Farooq Chang MD, 10 mL at 06/15/25 0821    sodium chloride 0.9 % flush 10 mL, 10 mL, Intravenous, PRN, Farooq Chang MD    sodium chloride 0.9 % flush 20 mL, 20 mL, Intravenous, PRN, Farooq Chang MD    sodium chloride 0.9 % infusion 40 mL, 40 mL, Intravenous, PRN, Ammon Aly MD    sodium chloride 0.9 % infusion 40 mL, 40 mL, Intravenous, PRN, Farooq Chang MD    vancomycin (VANCOCIN) 1,000 mg in sodium chloride 0.9 % 250 mL IVPB-VTB, 1,000 mg, Intravenous, Q12H, Dustin Cerna MD, Last Rate: 250 mL/hr at 06/15/25 0551, 1,000 mg at 06/15/25 0551      Vital Signs  Temp:  [98.2 °F (36.8 °C)-98.4 °F (36.9 °C)] 98.2 °F (36.8 °C)  Heart Rate:  [72-95] 72  Resp:  [16] 16  BP: (105-130)/(50-67) 127/67    Physical Exam:  General: NAD, very nice, sitting up in chair  ENT:  No scleral icterus  Respiratory: Normal work of breathing on RA  Skin: No rashes or lesions in exposed areas  MSK: Left hip bandaged  Access: RUE PICC without erythema.         Results Review:    CBC, BMP, CRP, Vanco level, and operative cultures reviewed today    Lab Results   Component Value Date    WBC 8.32 06/15/2025    HGB 8.6 (L) 06/15/2025    HCT 25.9 (L) 06/15/2025    MCV 86.9 06/15/2025     06/15/2025     Lab Results   Component Value Date    GLUCOSE 95 06/15/2025    BUN 12.0 06/15/2025    CREATININE 0.67 06/15/2025    EGFRIFAFRI >60 12/15/2020    BCR 17.9 06/15/2025    CO2 25.0 06/15/2025    CALCIUM 8.2 (L) 06/15/2025    ALBUMIN 3.1 (L) 06/15/2025    LABIL2 1.3 12/15/2020    AST 24 06/15/2025    ALT 36 (H) 06/15/2025     Lab Results   Component Value Date    CRP 5.78 (H) 06/11/2025       Lab Results   Component Value Date    VANCOTROUGH 18.10 06/14/2025        Microbiology:  Microbiology Results (last 10 days)       Procedure Component Value - Date/Time    Anaerobic Culture - Tissue, Hip, Left [[id number]]  (Normal) Collected: 06/11/25 1550    Lab Status: Preliminary result Specimen: Tissue from Hip, Left Updated: 06/14/25 0759     Anaerobic Culture No anaerobes isolated at 3 days    Tissue / Bone Culture - Tissue, Hip, Left [[id number]] Collected: 06/11/25 1550    Lab Status: Final result Specimen: Tissue from Hip, Left Updated: 06/14/25 0679     Tissue Culture No growth at 3 days     Gram Stain Moderate (3+) WBCs seen      No organisms seen    Anaerobic Culture - Surgical Site, Hip, Left [[id number]]  (Normal) Collected: 06/11/25 1549    Lab Status: Preliminary result Specimen: Surgical Site from Hip, Left Updated: 06/14/25 0759     Anaerobic Culture No anaerobes isolated at 3 days    Wound Culture - Surgical Site, Hip, Left [[id number]] Collected: 06/11/25 1549    Lab Status: Final result Specimen: Surgical Site from Hip, Left Updated: 06/14/25 0648     Wound Culture No growth at 3 days     Gram  Stain Few (2+) WBCs seen      No organisms seen    Anaerobic Culture - Surgical Site, Hip, Left [[id number]]  (Normal) Collected: 06/11/25 1547    Lab Status: Preliminary result Specimen: Surgical Site from Hip, Left Updated: 06/14/25 0759     Anaerobic Culture No anaerobes isolated at 3 days    Wound Culture - Surgical Site, Hip, Left [[id number]] Collected: 06/11/25 1547    Lab Status: Final result Specimen: Surgical Site from Hip, Left Updated: 06/14/25 0648     Wound Culture No growth at 3 days     Gram Stain Moderate (3+) WBCs seen      No organisms seen    Body Fluid Culture - Aspirate, Hip, Left [[id number]] Collected: 06/11/25 0856    Lab Status: Preliminary result Specimen: Aspirate from Hip, Left Updated: 06/15/25 0722     Body Fluid Culture No growth at 4 days     Gram Stain Rare (1+) WBCs seen      No organisms seen    Anaerobic Culture - Swab, Hip, Left [[id number]]  (Normal) Collected: 06/11/25 0856    Lab Status: Preliminary result Specimen: Swab from Hip, Left Updated: 06/14/25 0759     Anaerobic Culture No anaerobes isolated at 3 days    Blood Culture - Blood, Arm, Right [[id number]]  (Normal) Collected: 06/10/25 2359    Lab Status: Preliminary result Specimen: Blood from Arm, Right Updated: 06/15/25 0016     Blood Culture No growth at 4 days    Blood Culture - Blood, Arm, Left [[id number]]  (Normal) Collected: 06/10/25 2353    Lab Status: Preliminary result Specimen: Blood from Arm, Left Updated: 06/15/25 0016     Blood Culture No growth at 4 days           Prior radiology:  MRI left hip shows a moderate to large left hip effusion and a near full-thickness tear of the left ligamentum teres per my review of the radiology report    Isolation:   No active isolations    Assessment      #Left hip native septic arthritis  #Acute left hip pain  #Obesity BMI 38-complicating above    On 6/11/2025, she went to the operating room with orthopedic surgery for debridement of the left hip.  Operative aspiration  cultures are negative to date.  I recommend that she be treated with vancomycin 1,000 mg IV every 12 hours with goal -600 and ceftriaxone 2 g IV every 24 hours x 4 weeks with stop date of 7/11/2025 at which time she will follow-up with Dr. Ellsworth in the infectious diseases clinic.  Single-lumen PICC is in.  She will need weekly CBC with differential, creatinine, vanco level, and CRP faxed to Dr. Ellsworth at [phone].    Thank you for allowing me to be involved in the care of this patient. Infectious diseases will sign off at this time with antibiotics plan in place, but please call me at [phone] if any further ID questions or new ID concerns.

## 2025-06-15 NOTE — PLAN OF CARE
Goal Outcome Evaluation:                 [x]  Stable   []  Watcher  []  Unstable  Reason for admission: Left hip pain and fever since Thursday of last week, nausea and abdominal pain  Significant PMH: HTN, Hyperlipidemia, PFO, Loop recorder   Significant 24 hour events: PICC line place consent was signed [facility] and Home health set up for [place] home Monday     Significant Assessment Findings: continue on vancomycin and rocephin     Social:  at bedside  Additional Info: Fluid aspiration 6/10 with concern for infection--  POD 4 I&D of L hip infection this afternoon.  Pain controlled w/ prn meds   Mobility Plan: stand by Assist to bathroom

## 2025-06-15 NOTE — CONSULTS
Gastroenterology   Initial Inpatient Consult Note  Thank you for asking opinion on this patient  Referring Provider: HEYDI Cerna MD    Reason for Consultation: Anemia, heme positive stool    Subjective     History of present illness:    54 y.o. female that we are asked to see for anemia and heme positive stool.  Patient denies to me any overt bleeding.  She has no melena or hematochezia.  She is currently hospitalized for septic arthritis of the left hip status post left hip aspiration by Ortho.  She is on antibiotics.  No recent endoscopic evaluation.    Past Medical History:  Past Medical History:   Diagnosis Date    Hyperlipidemia 5/22/24    Hypertension     improved with weight loss    Implantable loop recorder present 07/2024    Obesity     PFO (patent foramen ovale)     Stroke      Past Surgical History:  Past Surgical History:   Procedure Laterality Date    CARDIAC ELECTROPHYSIOLOGY PROCEDURE N/A 07/09/2024    Procedure: Loop insertion  medtronic;  Surgeon: Mahin Crooks MD;  Location: [facility] CATH INVASIVE LOCATION;  Service: Cardiovascular;  Laterality: N/A;    INCISION AND DRAINAGE HIP Left 6/11/2025    Procedure: HIP INCISION AND DRAINAGE WITH HANA TABLE;  Surgeon: Ammon Aly MD;  Location: [facility] OR [facility];  Service: Orthopedics;  Laterality: Left;    PATENT FORAMEN OVALE CLOSURE N/A 12/13/2024    Procedure: Patent foramen ovale closure ABBOTT;  Surgeon: Mahin Crooks MD;  Location: [facility] CATH INVASIVE LOCATION;  Service: Cardiology;  Laterality: N/A;      Social History:   Social History     Tobacco Use    Smoking status: Never     Passive exposure: Never    Smokeless tobacco: Never   Substance Use Topics    Alcohol use: Not Currently     Comment: No longer drinking alcohol      Family History:  Family History   Problem Relation Age of Onset    Hypertension Mother     Stroke Father     Hypertension Father     Hyperlipidemia Father     Heart disease Maternal Grandfather        Home  Meds:  Medications Prior to Admission   Medication Sig Dispense Refill Last Dose/Taking    Aspirin Low Dose 81 MG EC tablet Take 1 tablet by mouth Daily.   6/6/2025    clopidogrel (PLAVIX) 75 MG tablet Take 1 tablet by mouth Daily. 90 tablet 0 3/11/2025    [employer] TRIPLE MAGNESIUM COMPLEX PO Take 300 mg by mouth Every Night.   6/10/2025    multivitamin with minerals tablet tablet Take 1 tablet by mouth Daily.   6/10/2025    amoxicillin (AMOXIL) 500 MG capsule Take 4 capsules by mouth See Admin Instructions. 4 capsules 1 hour prior to procedure 12 capsule 0      Current Meds:   [Held by provider] aspirin, 81 mg, Oral, Q12H  cefTRIAXone, 2,000 mg, Intravenous, Q24H  [Held by provider] docusate sodium, 100 mg, Oral, BID  [Held by provider] meloxicam, 15 mg, Oral, Daily  pantoprazole, 40 mg, Intravenous, BID AC  [Held by provider] polyethylene glycol, 17 g, Oral, BID  sodium chloride, 10 mL, Intravenous, Q12H  sodium chloride, 10 mL, Intravenous, Q12H  vancomycin, 1,000 mg, Intravenous, Q12H      Allergies:  Allergies   Allergen Reactions    Sulfa Antibiotics Hives     Review of Systems  Pertinent items are noted in HPI, all other systems reviewed and negative    Objective     Vital Signs  Temp:  [98.4 °F (36.9 °C)-99.1 °F (37.3 °C)] 98.4 °F (36.9 °C)  Heart Rate:  [75-95] 95  Resp:  [16-18] 16  BP: (130-132)/(50-73) 130/50    Physical Exam:  CONSTITUTIONAL:  today's vital signs reviewed  EARS NOSE THROAT: trachea midline and no deformity of the nares  EYES: no scleral icterus  GASTROINTESTINAL: abdomen is soft, nontender, nondistended with normal active bowel sounds, no masses are appreciated  PSYCHIATRIC: appropriate mood and affect  RESPIRATORY: normal inspiratory effort with no increased work of breathing  NEUROLOGIC: patient is awake and alert  DERMATOLOGIC: skin is warm with no cyanosis  LYMPHATIC: no periumbilical lymphadenopathy     Results Review:   I reviewed the patient's new clinical results.    Results from  last 7 days   Lab Units 06/14/25  1220 06/14/25  0540 06/13/25  1209 06/13/25  0418 06/12/25 0225 06/11/25  0734   WBC 10*3/mm3  --  8.19  --  9.59  --  10.60   HEMOGLOBIN g/dL 9.8* 8.5* 9.5* 8.5*   < > 10.3*   HEMATOCRIT % 29.7* 26.2* 29.2* 25.0*   < > 32.0*   PLATELETS 10*3/mm3  --  283  --  251  --  262    < > = values in this interval not displayed.     Results from last 7 days   Lab Units 06/14/25  0540 06/13/25  0418 06/12/25 0225 06/11/25  0734 06/10/25  2243   SODIUM mmol/L 140 144 138   < > 137   POTASSIUM mmol/L 4.3 4.9 4.4   < > 4.7   CHLORIDE mmol/L 106 109* 108*   < > 104   CO2 mmol/L 26.2 25.0 20.8*   < > 22.0   BUN mg/dL 13.0 15.0 13.0   < > 14.0   CREATININE mg/dL 0.54* 0.94 0.74  0.77   < > 0.71   CALCIUM mg/dL 8.2* 8.3* 8.2*   < > 9.1   BILIRUBIN mg/dL <0.2 <0.2  --   --  0.4   ALK PHOS U/L 103 107  --   --  133*   ALT (SGPT) U/L 38* 41*  --   --  37*   AST (SGOT) U/L 20 31  --   --  27   GLUCOSE mg/dL 96 97 180*   < > 107*    < > = values in this interval not displayed.         Lab Results   Lab Value Date/Time    LIPASE 20 06/10/2025 2243       Radiology:  FL Guided Aspiration Joint   Final Result   Technically successful left hip joint aspiration with removal of   approximately 12 mL of cloudy viscous yellow/orange-colored fluid.           Procedure performed by ANABELA Blake.   By electronically signing this report, I, the supervising radiologist,   attest that I was not present for the procedure(s) but agree with the   final edited report.       This report was finalized on 6/11/2025 10:55 AM by Dr. Chandra Christie M.D on Workstation: [id number]          MRI Hip Left Without Contrast   Final Result       1. Nonspecific moderate to large left hip joint effusion with mild soft   tissue edema along the anterior and lateral proximal left femur.   Correlate with ESR and CRP and possible joint fluid analysis if there is   clinical concern for septic arthritis.   2. Full-thickness or near  full-thickness tear of the left ligamentum   teres.   3. Mild edema in the left abductor muscles in the left abductor   compartment, which could represent mild muscle strain or nonspecific   myositis.           This report was finalized on 6/11/2025 7:31 AM by Oleksandr Wyatt MD on   Workstation: [id number]          CT Abdomen Pelvis With Contrast   Final Result   Addendum (preliminary) 1 of 1   Patient does have a left hip effusion. No aggressive osseous   abnormalities are seen.       This report was finalized on 6/11/2025 12:58 AM by Dr. Evelyn Carlos M.D on Workstation: [id number]          Final   1. No acute intra-abdominal or intrapelvic process seen.       2. Indeterminate solid pulmonary nodule measuring 5 mm. In a low-risk   patient with a solid nodule <6 mm, recommend no follow-up. In a   high-risk patient, CT at 12 months is optional with stronger   consideration if there is suspicious nodule morphology and/or upper lobe   location.       Radiation dose reduction techniques were utilized, including automated   exposure control and exposure modulation based on body size.           This report was finalized on 6/11/2025 12:12 AM by Dr. Evelyn Carlos M.D on Workstation: [id number]              Assessment & Plan   Active Hospital Problems    Diagnosis     **Acute hip pain, left     Septic hip     S/P patent foramen ovale closure        Assessment:  Anemia and heme positive stool  Status post PFO closure  Septic left hip status post aspiration on antibiotics    Plan:  Would recommend outpatient EGD and colonoscopy in 2-4 weeks after discharge after she has recovered from the septic arthritis.  I see no need for emergent endoscopy in the absence of overt bleeding and relatively stable hemoglobin.   Please let us know of any change in status in regard to overt or new GI bleeding.       I discussed the patients findings and my recommendations with patient and nursing staff.    Deven Tristan,  MD Deven Tristan M.D.  [facility] [place] [address]  Office: [phone]

## 2025-06-15 NOTE — PROGRESS NOTES
Name: Linda Castano ADMIT: 6/10/2025   : 1970  PCP: Sintia Kemp APRN    MRN: [id number] LOS: 4 days   AGE/SEX: 54 y.o. female  ROOM: [facility]     Subjective   Subjective   Patient seen this morning.  No events overnight.  No fevers no chills.  Hip pain stable.  Remains on IV ceftriaxone vancomycin.    Review of Systems   As above  Objective   Objective   Vital Signs  Temp:  [98.2 °F (36.8 °C)-98.4 °F (36.9 °C)] 98.2 °F (36.8 °C)  Heart Rate:  [72-95] 72  Resp:  [16-18] 18  BP: (105-130)/(50-67) 127/67  SpO2:  [97 %-100 %] 98 %  on   ;   Device (Oxygen Therapy): room air  Body mass index is 38.02 kg/m².  Physical Exam    General: Alert, sitting up in the chair, not in distress,  HEENT: Normocephalic, atraumatic  CV: Regular rate and rhythm, no murmurs rubs or gallops  Lungs: Clear to auscultation bilaterally, no crackles or wheezes  Abdomen: Soft, nontender, nondistended  Extremities:     Results Review     I reviewed the patient's new clinical results.  Results from last 7 days   Lab Units 06/15/25  0548 25  1220 25  0540 25  1209 258 25  0225 25  0734   WBC 10*3/mm3 8.32  --  8.19  --  9.59  --  10.60   HEMOGLOBIN g/dL 8.6* 9.8* 8.5* 9.5* 8.5*   < > 10.3*   PLATELETS 10*3/mm3 280  --  283  --  251  --  262    < > = values in this interval not displayed.     Results from last 7 days   Lab Units 06/15/25  0548 25  0540 25  0418 25  0225   SODIUM mmol/L 143 140 144 138   POTASSIUM mmol/L 4.4 4.3 4.9 4.4   CHLORIDE mmol/L 109* 106 109* 108*   CO2 mmol/L 25.0 26.2 25.0 20.8*   BUN mg/dL 12.0 13.0 15.0 13.0   CREATININE mg/dL 0.67 0.54* 0.94 0.74  0.77   GLUCOSE mg/dL 95 96 97 180*   Estimated Creatinine Clearance: 102.7 mL/min (by C-G formula based on SCr of 0.67 mg/dL).  Results from last 7 days   Lab Units 06/15/25  0548 25  0540 25  0418 06/10/25  2243   ALBUMIN g/dL 3.1* 3.0* 2.9* 4.0   BILIRUBIN mg/dL <0.2 <0.2 <0.2 0.4  "  ALK PHOS U/L 119* 103 107 133*   AST (SGOT) U/L 24 20 31 27   ALT (SGPT) U/L 36* 38* 41* 37*     Results from last 7 days   Lab Units 06/15/25  0548 06/14/25  0540 06/13/25  0418 06/12/25  0225 06/11/25  0734 06/10/25  2243   CALCIUM mg/dL 8.2* 8.2* 8.3* 8.2*   < > 9.1   ALBUMIN g/dL 3.1* 3.0* 2.9*  --   --  4.0   MAGNESIUM mg/dL  --  2.0  --   --   --   --    PHOSPHORUS mg/dL  --  4.0  --   --   --   --     < > = values in this interval not displayed.     Results from last 7 days   Lab Units 06/10/25  2353   LACTATE mmol/L 0.8   No results found for: \"COVID19\"  No results found for: \"HGBA1C\", \"POCGLU\"        FL Guided Aspiration Joint  Narrative: LEFT HIP FLUOROSCOPICALLY GUIDED JOINT ASPIRATION, 6/11/2025     HISTORY:  54-year-old female with left hip pain. MRI demonstrates moderate to  large left hip effusion. Rule out septic joint.     CONSENT:  Procedure, risks and alternatives were discussed in detail with the  patient, including the low risk of allergy and infection; patient  questions were answered, and informed consent was obtained. Timeout was  observed in the procedure room for patient identification and procedure  site verification, and the site was marked by me.     PROCEDURE:  *  Reference Air Kerma: 2.4 mGy     Using sterile technique, 1% lidocaine local anesthetic and fluoroscopic  guidance, a 22-gauge spinal needle was placed into the right hip joint  capsule without difficulty. Aspiration of approximately 12 mL of cloudy  viscous yellow/orange-colored fluid. The needle was removed and manual  pressure was applied achieving adequate hemostasis. A Band-Aid was  applied. Specimen was sent to laboratory as ordered by the requesting  provider. The patient tolerated the procedure well without incident.        Impression: Technically successful left hip joint aspiration with removal of  approximately 12 mL of cloudy viscous yellow/orange-colored fluid.        Procedure performed by Ana Lilia Santoyo, " ANABELA.  By electronically signing this report, I, the supervising radiologist,  attest that I was not present for the procedure(s) but agree with the  final edited report.     This report was finalized on 6/11/2025 10:55 AM by Dr. Chandra Christie M.D on Workstation: [id number]     MRI Hip Left Without Contrast  Narrative: MRI HIP LEFT WO CONTRAST-     6/11/2025 4:00 AM     INDICATION: acute left hip pain, suspect effusion on CT, possible septic  arthritis.     COMPARISON: CT abdomen pelvis 6/10/2025.     TECHNIQUE: Multiplanar, multisequence MR imaging of the pelvis and hip  was performed without the intravenous administration of contrast.     FINDINGS:  SOFT TISSUES: Mild patchy soft tissue edema anterior and lateral to the  proximal left femur and in the left adductor compartment. No cystic or  solid soft tissue mass. No abnormal bursal fluid collection. No  pathologically enlarged lymph nodes. No well-formed or drainable fluid  collection to suggest abscess. The sciatic nerves are unremarkable as  imaged.     BONES: Tiny likely degenerative subchondral cystic changes at the  anterior/superior left acetabulum. No acute fracture. No concerning bone  marrow lesion or marrow replacing process.     JOINTS: Moderate to large left hip joint effusion. Mild left hip joint  chondromalacia with full-thickness or near full-thickness chondral  fissuring at the anterior/superior left acetabulum. The articular  cartilage in the contralateral (right) hip joint space is fairly well  maintained on the wide field-of-view sequences. Full-thickness or near  full-thickness tear of the left ligamentum teres. Mild DJD at the pubic  symphysis. Mild bilateral SI joint DJD. Partially imaged lumbosacral  spondylosis.     LABRUM: Likely degenerative tear of the anterior/superior left  acetabular labrum. Tiny 0.5 cm associated anterior/superior left  paralabral cyst.     MUSCLES AND TENDONS: Mild edema throughout the left adductor muscles  and  in the left tensor fascia ana muscle. The anterior muscles and tendons  are otherwise intact. The gluteal tendons are intact. Significant  muscular fatty atrophy. The proximal hamstring tendons are intact.     Impression:    1. Nonspecific moderate to large left hip joint effusion with mild soft  tissue edema along the anterior and lateral proximal left femur.  Correlate with ESR and CRP and possible joint fluid analysis if there is  clinical concern for septic arthritis.  2. Full-thickness or near full-thickness tear of the left ligamentum  teres.  3. Mild edema in the left abductor muscles in the left abductor  compartment, which could represent mild muscle strain or nonspecific  myositis.        This report was finalized on 6/11/2025 7:31 AM by Oleksandr Wyatt MD on  Workstation: [id number]     CT Abdomen Pelvis With Contrast  Addendum: Patient does have a left hip effusion. No aggressive osseous   abnormalities are seen.       This report was finalized on 6/11/2025 12:58 AM by Dr. Evelyn Carlos M.D on Workstation: [id number]  Narrative: CT OF THE ABDOMEN PELVIS WITH CONTRAST     HISTORY: Left flank pain and fever     COMPARISON: None available.     TECHNIQUE: Axial CT imaging was obtained through the abdomen and pelvis.  IV contrast was administered.     FINDINGS:  The patient is status post closure of a patent foramen ovale. Images  through the lung bases are degraded by motion artifact. There is a  potential nodule within the left lower lobe, measuring 5 mm. No  suspicious hepatic lesions are seen. The stomach, adrenal glands,  spleen, pancreas, and gallbladder all appear normal there is a duodenal  diverticulum.. The kidneys enhance symmetrically. There is no  hydronephrosis. There are bilateral parapelvic cysts. Uterus appears to  normal. No adnexal masses are seen. No distal ureteral or bladder stones  are seen. There is colonic diverticulosis. There is no bowel  obstruction. The appendix is  normal. No acute osseous abnormalities are  seen.     Impression: 1. No acute intra-abdominal or intrapelvic process seen.     2. Indeterminate solid pulmonary nodule measuring 5 mm. In a low-risk  patient with a solid nodule <6 mm, recommend no follow-up. In a  high-risk patient, CT at 12 months is optional with stronger  consideration if there is suspicious nodule morphology and/or upper lobe  location.     Radiation dose reduction techniques were utilized, including automated  exposure control and exposure modulation based on body size.        This report was finalized on 6/11/2025 12:12 AM by Dr. Evelyn Carlos M.D on Workstation: [id number]       Scheduled Medications  aspirin, 81 mg, Oral, Q12H  cefTRIAXone, 2,000 mg, Intravenous, Q24H  [Held by provider] docusate sodium, 100 mg, Oral, BID  [Held by provider] meloxicam, 15 mg, Oral, Daily  pantoprazole, 40 mg, Intravenous, BID AC  [Held by provider] polyethylene glycol, 17 g, Oral, BID  sodium chloride, 10 mL, Intravenous, Q12H  sodium chloride, 10 mL, Intravenous, Q12H  vancomycin, 1,000 mg, Intravenous, Q12H    Infusions  Pharmacy to dose vancomycin,     Diet  Diet: Regular/House; Fluid Consistency: Thin (IDDSI 0)    I have personally reviewed     [x]  Laboratory   [x]  Microbiology   [x]  Radiology   [x]  EKG/Telemetry  [x]  Cardiology/Vascular   []  Pathology    []  Records       Assessment/Plan     Active Hospital Problems    Diagnosis  POA    **Acute hip pain, left [M25.552]  Yes    Septic hip [M00.9]  Unknown    S/P patent foramen ovale closure [Z87.74]  Not Applicable      Resolved Hospital Problems   No resolved problems to display.       54-year-old female with history of prior CVA with PFO s/p closure on aspirin, presented to the ED complaining of hospital because of worsening left hip pain.    Left hip septic arthritis  -Status post irrigation and debridement 06/11/2025 per orthopedic surgery.  - Cultures negative to date.  -On vancomycin and  ceftriaxone,   - ID evaluated  4 weeks of antibiotics recommended to stop date of 07/11/2025 at which time she will follow-up with Dr. Ellsworth in the infectious diseases clinic.   - PICC line ordered        Anemia  - Hemoglobin close 11.2 on admission, gradually trended down to as low as 8.1  - No signs of active bleeding  -Likely partly related secondary to blood loss anemia due to surgery however would not expect to see significant drop.  - IV PPI empirically,   -hold meloxicam.    - Hemoccult stool positive  - Hemoglobin stable  - GI consulted.  Recommended outpatient EGD colonoscopy in 2 to 4 weeks    History of CVA status with PFO s/p PFO closure  -Completed Plavix, takes aspirin 81 mg daily.  - Follows with cardiology outpatient  - Continue low-dose aspirin      Right upper extremity swelling  - Developed swelling following PICC line placement.  - Stat duplex ultrasound ordered    SCDs for DVT prophylaxis.  Full code.  Discussed with patient.  Disposition: Home with home health, likely on Monday once home health for antibiotic infusion set up and pending right upper extremity duplex ultrasound.  Expected Discharge Date: 6/16/2025; Expected Discharge Time:        Copied text in this note has been reviewed and is accurate as of 06/15/25.         Dictated utilizing Dragon dictation        Dustin Cerna MD  [facility]  06/15/25  12:55 EDT

## 2025-06-16 ENCOUNTER — TELEPHONE (OUTPATIENT)
Dept: ORTHOPEDIC SURGERY | Facility: CLINIC | Age: 55
End: 2025-06-16
Payer: COMMERCIAL

## 2025-06-16 ENCOUNTER — READMISSION MANAGEMENT (OUTPATIENT)
Dept: CALL CENTER | Facility: HOSPITAL | Age: 55
End: 2025-06-16
Payer: COMMERCIAL

## 2025-06-16 VITALS
SYSTOLIC BLOOD PRESSURE: 123 MMHG | DIASTOLIC BLOOD PRESSURE: 70 MMHG | HEART RATE: 76 BPM | HEIGHT: 62 IN | RESPIRATION RATE: 16 BRPM | BODY MASS INDEX: 38.26 KG/M2 | WEIGHT: 207.89 LBS | TEMPERATURE: 98.2 F | OXYGEN SATURATION: 97 %

## 2025-06-16 PROBLEM — D64.9 ANEMIA: Status: ACTIVE | Noted: 2025-06-16

## 2025-06-16 LAB
ALBUMIN SERPL-MCNC: 3 G/DL (ref 3.5–5.2)
ALBUMIN/GLOB SERPL: 1.1 G/DL
ALP SERPL-CCNC: 115 U/L (ref 39–117)
ALT SERPL W P-5'-P-CCNC: 40 U/L (ref 1–33)
ANION GAP SERPL CALCULATED.3IONS-SCNC: 6.9 MMOL/L (ref 5–15)
AST SERPL-CCNC: 24 U/L (ref 1–32)
BACTERIA FLD CULT: NORMAL
BACTERIA SPEC AEROBE CULT: NORMAL
BACTERIA SPEC AEROBE CULT: NORMAL
BACTERIA SPEC ANAEROBE CULT: NORMAL
BASOPHILS # BLD AUTO: 0.06 10*3/MM3 (ref 0–0.2)
BASOPHILS NFR BLD AUTO: 0.8 % (ref 0–1.5)
BILIRUB SERPL-MCNC: <0.2 MG/DL (ref 0–1.2)
BUN SERPL-MCNC: 13 MG/DL (ref 6–20)
BUN/CREAT SERPL: 17.6 (ref 7–25)
CALCIUM SPEC-SCNC: 8.7 MG/DL (ref 8.6–10.5)
CHLORIDE SERPL-SCNC: 108 MMOL/L (ref 98–107)
CO2 SERPL-SCNC: 27.1 MMOL/L (ref 22–29)
CREAT SERPL-MCNC: 0.74 MG/DL (ref 0.57–1)
DEPRECATED RDW RBC AUTO: 43 FL (ref 37–54)
EGFRCR SERPLBLD CKD-EPI 2021: 96.3 ML/MIN/1.73
EOSINOPHIL # BLD AUTO: 0.32 10*3/MM3 (ref 0–0.4)
EOSINOPHIL NFR BLD AUTO: 4.4 % (ref 0.3–6.2)
ERYTHROCYTE [DISTWIDTH] IN BLOOD BY AUTOMATED COUNT: 13 % (ref 12.3–15.4)
GLOBULIN UR ELPH-MCNC: 2.7 GM/DL
GLUCOSE SERPL-MCNC: 85 MG/DL (ref 65–99)
GRAM STN SPEC: NORMAL
GRAM STN SPEC: NORMAL
HCT VFR BLD AUTO: 28.7 % (ref 34–46.6)
HGB BLD-MCNC: 9.1 G/DL (ref 12–15.9)
IMM GRANULOCYTES # BLD AUTO: 0.03 10*3/MM3 (ref 0–0.05)
IMM GRANULOCYTES NFR BLD AUTO: 0.4 % (ref 0–0.5)
LYMPHOCYTES # BLD AUTO: 1.96 10*3/MM3 (ref 0.7–3.1)
LYMPHOCYTES NFR BLD AUTO: 26.7 % (ref 19.6–45.3)
MCH RBC QN AUTO: 28.7 PG (ref 26.6–33)
MCHC RBC AUTO-ENTMCNC: 31.7 G/DL (ref 31.5–35.7)
MCV RBC AUTO: 90.5 FL (ref 79–97)
MONOCYTES # BLD AUTO: 0.61 10*3/MM3 (ref 0.1–0.9)
MONOCYTES NFR BLD AUTO: 8.3 % (ref 5–12)
NEUTROPHILS NFR BLD AUTO: 4.37 10*3/MM3 (ref 1.7–7)
NEUTROPHILS NFR BLD AUTO: 59.4 % (ref 42.7–76)
NRBC BLD AUTO-RTO: 0 /100 WBC (ref 0–0.2)
PLATELET # BLD AUTO: 308 10*3/MM3 (ref 140–450)
PMV BLD AUTO: 9 FL (ref 6–12)
POTASSIUM SERPL-SCNC: 4.6 MMOL/L (ref 3.5–5.2)
PROT SERPL-MCNC: 5.7 G/DL (ref 6–8.5)
RBC # BLD AUTO: 3.17 10*6/MM3 (ref 3.77–5.28)
SODIUM SERPL-SCNC: 142 MMOL/L (ref 136–145)
WBC NRBC COR # BLD AUTO: 7.35 10*3/MM3 (ref 3.4–10.8)

## 2025-06-16 PROCEDURE — 85025 COMPLETE CBC W/AUTO DIFF WBC: CPT | Performed by: HOSPITALIST

## 2025-06-16 PROCEDURE — 80053 COMPREHEN METABOLIC PANEL: CPT | Performed by: STUDENT IN AN ORGANIZED HEALTH CARE EDUCATION/TRAINING PROGRAM

## 2025-06-16 PROCEDURE — 25810000003 SODIUM CHLORIDE 0.9 % SOLUTION 250 ML FLEX CONT: Performed by: STUDENT IN AN ORGANIZED HEALTH CARE EDUCATION/TRAINING PROGRAM

## 2025-06-16 PROCEDURE — 25010000002 CEFTRIAXONE PER 250 MG: Performed by: INTERNAL MEDICINE

## 2025-06-16 PROCEDURE — 25010000002 VANCOMYCIN 1 G RECONSTITUTED SOLUTION 1 EACH VIAL: Performed by: STUDENT IN AN ORGANIZED HEALTH CARE EDUCATION/TRAINING PROGRAM

## 2025-06-16 RX ORDER — PANTOPRAZOLE SODIUM 40 MG/1
40 TABLET, DELAYED RELEASE ORAL
Status: DISCONTINUED | OUTPATIENT
Start: 2025-06-16 | End: 2025-06-16 | Stop reason: HOSPADM

## 2025-06-16 RX ADMIN — CEFTRIAXONE 2000 MG: 2 INJECTION, POWDER, FOR SOLUTION INTRAMUSCULAR; INTRAVENOUS at 11:55

## 2025-06-16 RX ADMIN — Medication 10 ML: at 08:00

## 2025-06-16 RX ADMIN — HYDROCODONE BITARTRATE AND ACETAMINOPHEN 1 TABLET: 5; 325 TABLET ORAL at 13:56

## 2025-06-16 RX ADMIN — ASPIRIN 81 MG: 81 TABLET, COATED ORAL at 08:15

## 2025-06-16 RX ADMIN — PANTOPRAZOLE SODIUM 40 MG: 40 TABLET, DELAYED RELEASE ORAL at 08:15

## 2025-06-16 RX ADMIN — VANCOMYCIN HYDROCHLORIDE 1000 MG: 1 INJECTION, POWDER, LYOPHILIZED, FOR SOLUTION INTRAVENOUS at 07:15

## 2025-06-16 NOTE — DISCHARGE SUMMARY
Patient Name: Linda Castano  : 1970  MRN: [id number]    Date of Admission: 6/10/2025  Date of Discharge:  2025  Primary Care Physician: Sintia Kemp APRN      Chief Complaint:   Hip Pain and Fever      Discharge Diagnoses     Active Hospital Problems    Diagnosis  POA    **Acute hip pain, left [M25.552]  Yes    Anemia [D64.9]  Unknown    Septic hip [M00.9]  Unknown    S/P patent foramen ovale closure [Z87.74]  Not Applicable      Resolved Hospital Problems   No resolved problems to display.        Hospital Course     54-year-old female with history of prior CVA with PFO s/p closure on aspirin, presented to the ED complaining of hospital because of worsening left hip pain.     Left hip septic arthritis  -POOL of the left hip showed Nonspecific moderate to large left hip joint effusion with mild soft  tissue edema along the anterior and lateral proximal left femur.2. Full-thickness or near full-thickness tear of the left ligamentum teres.3. Mild edema in the left abductor muscles in the left abductor compartment, which could represent mild muscle strain or nonspecific myositis.  -Status post irrigation and debridement 2025 per orthopedic surgery.  - Blood cultures and operative cultures came back negative.  ID evaluated.  Recommended  4 weeks of antibiotics recommended with  date of 2025 at which time she will follow-up with Dr. Ellsworth in the infectious diseases clinic.  PICC line was placed  Patient will need will need weekly CBC with differential, creatinine, and CRP faxed to Dr. Ellsworth at [phone].    - Will need to follow-up with orthopedic surgery as scheduled  -     Anemia  - Hemoglobin close 11.2 on admission, gradually trended down to as low as 8.1  - No signs of active bleeding were reported  -Likely partly related secondary to blood loss anemia due to surgery however would not expect to see significant drop.  - Was initiated on IV PPI empirically, meloxicam was held,  aspirin was held temporarily.  Hemoccult stool was obtained which came back positive.  GI was consulted.  - Hemoglobin remained stable between 8-9.  GI evaluated,  Recommended outpatient EGD colonoscopy in 2 to 4 weeks as hemoglobin remained stable with no active signs of bleeding.  -Patient to have weekly labs draws while on IV antibiotics, and hemoglobin to monitored with CBC     History of CVA status with PFO s/p PFO closure  -Completed Plavix, takes aspirin 81 mg daily.  - Follows with cardiology outpatient  - Continued aspirin at  discharge as  hemoglobin remained stable with no signs of active bleeding.          Right upper extremity swelling  - Developed swelling following PICC line placement.  - Stat duplex ultrasound was ordered, was negative for DVT/superficial thrombophlebitis.       At the time of discharge patient was told to take all medications as prescribed, keep all follow-up appointments, and call their doctor or return to the hospital with any worsening or concerning symptoms.           Day of Discharge     Subjective:  Patient seen this morning.  No recurrence overnight.  Reports right upper extremity swelling resolved.  Hip pain stable.  No signs of bleeding.    Physical Exam:  Temp:  [98.2 °F (36.8 °C)-98.4 °F (36.9 °C)] 98.2 °F (36.8 °C)  Heart Rate:  [76-95] 76  Resp:  [16] 16  BP: (110-129)/(53-79) 123/70  Body mass index is 38.02 kg/m².  Physical Exam    General: Alert and oriented x3, no acute distress  HEENT: Normocephalic, atraumatic  CV: Regular rate and rhythm, no murmurs rubs or gallops  Lungs: Clear to auscultation bilaterally, no crackles or wheezes  Abdomen: Soft, nontender, nondistended  Extremities: No significant peripheral edema , left hip swollen, dressing in place, clean, intact.    Consultants     Consult Orders (all) (From admission, onward)       Start     Ordered    06/14/25 1511  Inpatient Gastroenterology Consult  Once,   Status:  Canceled        Specialty:   Gastroenterology  Provider:  Charanjit Montaño MD    06/14/25 1511    06/14/25 0906  Inpatient IV Team Consult PICC 1 Lumen  Once        Provider:  (Not yet assigned)    06/14/25 0906    06/11/25 1646  Inpatient Cardiology Consult  Once,   Status:  Canceled        Specialty:  Cardiology  Provider:  Mahin Crooks MD    06/11/25 1645    06/11/25 1024  Inpatient Infectious Diseases Consult  Once        Specialty:  Infectious Diseases  Provider:  Aravind Ellsworth,     06/11/25 1027    06/11/25 0334  Inpatient Case Management  Consult  Once        Provider:  (Not yet assigned)    06/11/25 0334    06/11/25 0031  [facility] (on-call MD unless specified) Details  Once,   Status:  Canceled        Specialty:  Hospitalist  Provider:  (Not yet assigned)    06/11/25 0030    06/11/25 0031  Ortho (on-call MD unless specified)  Once        Specialty:  Orthopedic Surgery  Provider:  (Not yet assigned)    06/11/25 0030                  Procedures     HIP INCISION AND DRAINAGE WITH HANA TABLE      Imaging Results (All)       Procedure Component Value Units Date/Time    FL Guided Aspiration Joint [[id number]] Collected: 06/11/25 1010     Updated: 06/11/25 1058    Narrative:      LEFT HIP FLUOROSCOPICALLY GUIDED JOINT ASPIRATION, 6/11/2025     HISTORY:  54-year-old female with left hip pain. MRI demonstrates moderate to  large left hip effusion. Rule out septic joint.     CONSENT:  Procedure, risks and alternatives were discussed in detail with the  patient, including the low risk of allergy and infection; patient  questions were answered, and informed consent was obtained. Timeout was  observed in the procedure room for patient identification and procedure  site verification, and the site was marked by me.     PROCEDURE:  *  Reference Air Kerma: 2.4 mGy     Using sterile technique, 1% lidocaine local anesthetic and fluoroscopic  guidance, a 22-gauge spinal needle was placed into the right hip joint  capsule  without difficulty. Aspiration of approximately 12 mL of cloudy  viscous yellow/orange-colored fluid. The needle was removed and manual  pressure was applied achieving adequate hemostasis. A Band-Aid was  applied. Specimen was sent to laboratory as ordered by the requesting  provider. The patient tolerated the procedure well without incident.          Impression:      Technically successful left hip joint aspiration with removal of  approximately 12 mL of cloudy viscous yellow/orange-colored fluid.        Procedure performed by ANABELA Blake.  By electronically signing this report, I, the supervising radiologist,  attest that I was not present for the procedure(s) but agree with the  final edited report.     This report was finalized on 6/11/2025 10:55 AM by Dr. Chandra Christie M.D on Workstation: [id number]       MRI Hip Left Without Contrast [[id number]] Collected: 06/11/25 0720     Updated: 06/11/25 0734    Narrative:      MRI HIP LEFT WO CONTRAST-     6/11/2025 4:00 AM     INDICATION: acute left hip pain, suspect effusion on CT, possible septic  arthritis.     COMPARISON: CT abdomen pelvis 6/10/2025.     TECHNIQUE: Multiplanar, multisequence MR imaging of the pelvis and hip  was performed without the intravenous administration of contrast.     FINDINGS:  SOFT TISSUES: Mild patchy soft tissue edema anterior and lateral to the  proximal left femur and in the left adductor compartment. No cystic or  solid soft tissue mass. No abnormal bursal fluid collection. No  pathologically enlarged lymph nodes. No well-formed or drainable fluid  collection to suggest abscess. The sciatic nerves are unremarkable as  imaged.     BONES: Tiny likely degenerative subchondral cystic changes at the  anterior/superior left acetabulum. No acute fracture. No concerning bone  marrow lesion or marrow replacing process.     JOINTS: Moderate to large left hip joint effusion. Mild left hip joint  chondromalacia with full-thickness or  near full-thickness chondral  fissuring at the anterior/superior left acetabulum. The articular  cartilage in the contralateral (right) hip joint space is fairly well  maintained on the wide field-of-view sequences. Full-thickness or near  full-thickness tear of the left ligamentum teres. Mild DJD at the pubic  symphysis. Mild bilateral SI joint DJD. Partially imaged lumbosacral  spondylosis.     LABRUM: Likely degenerative tear of the anterior/superior left  acetabular labrum. Tiny 0.5 cm associated anterior/superior left  paralabral cyst.     MUSCLES AND TENDONS: Mild edema throughout the left adductor muscles and  in the left tensor fascia ana muscle. The anterior muscles and tendons  are otherwise intact. The gluteal tendons are intact. Significant  muscular fatty atrophy. The proximal hamstring tendons are intact.       Impression:         1. Nonspecific moderate to large left hip joint effusion with mild soft  tissue edema along the anterior and lateral proximal left femur.  Correlate with ESR and CRP and possible joint fluid analysis if there is  clinical concern for septic arthritis.  2. Full-thickness or near full-thickness tear of the left ligamentum  teres.  3. Mild edema in the left abductor muscles in the left abductor  compartment, which could represent mild muscle strain or nonspecific  myositis.        This report was finalized on 6/11/2025 7:31 AM by Oleksandr Wyatt MD on  Workstation: [id number]       CT Abdomen Pelvis With Contrast [[id number]] Collected: 06/11/25 0006     Updated: 06/11/25 0102    Addenda:        Patient does have a left hip effusion. No aggressive osseous  abnormalities are seen.     This report was finalized on 6/11/2025 12:58 AM by Dr. Evelyn Carlos M.D on Workstation: [id number]     Signed: 06/11/25 0058 by Evelyn Carlos MD    Narrative:      CT OF THE ABDOMEN PELVIS WITH CONTRAST     HISTORY: Left flank pain and fever     COMPARISON: None available.      TECHNIQUE: Axial CT imaging was obtained through the abdomen and pelvis.  IV contrast was administered.     FINDINGS:  The patient is status post closure of a patent foramen ovale. Images  through the lung bases are degraded by motion artifact. There is a  potential nodule within the left lower lobe, measuring 5 mm. No  suspicious hepatic lesions are seen. The stomach, adrenal glands,  spleen, pancreas, and gallbladder all appear normal there is a duodenal  diverticulum.. The kidneys enhance symmetrically. There is no  hydronephrosis. There are bilateral parapelvic cysts. Uterus appears to  normal. No adnexal masses are seen. No distal ureteral or bladder stones  are seen. There is colonic diverticulosis. There is no bowel  obstruction. The appendix is normal. No acute osseous abnormalities are  seen.       Impression:      1. No acute intra-abdominal or intrapelvic process seen.     2. Indeterminate solid pulmonary nodule measuring 5 mm. In a low-risk  patient with a solid nodule <6 mm, recommend no follow-up. In a  high-risk patient, CT at 12 months is optional with stronger  consideration if there is suspicious nodule morphology and/or upper lobe  location.     Radiation dose reduction techniques were utilized, including automated  exposure control and exposure modulation based on body size.        This report was finalized on 6/11/2025 12:12 AM by Dr. Evelyn Carlos M.D on Workstation: [id number]             Results for orders placed during the hospital encounter of 06/10/25    Duplex Venous Upper Extremity - Right CAR    Interpretation Summary    Normal right upper extremity venous duplex scan.    Results for orders placed during the hospital encounter of 02/19/25    Adult Transthoracic Echo Limited W/ Cont if Necessary Per Protocol    Interpretation Summary    Left ventricular systolic function is normal. Calculated left ventricular EF = 64.4%    Normal left atrial size and volume noted. Saline test  "results are negative for right to left atrial level shunt. ASD or PFO closure device in interatrial septum.    Pertinent Labs     Results from last 7 days   Lab Units 06/16/25  0743 06/15/25  0548 06/14/25  1220 06/14/25  0540 06/13/25  1209 06/13/25  0418   WBC 10*3/mm3 7.35 8.32  --  8.19  --  9.59   HEMOGLOBIN g/dL 9.1* 8.6* 9.8* 8.5*   < > 8.5*   PLATELETS 10*3/mm3 308 280  --  283  --  251    < > = values in this interval not displayed.     Results from last 7 days   Lab Units 06/16/25  0743 06/15/25  0548 06/14/25 0540 06/13/25 0418   SODIUM mmol/L 142 143 140 144   POTASSIUM mmol/L 4.6 4.4 4.3 4.9   CHLORIDE mmol/L 108* 109* 106 109*   CO2 mmol/L 27.1 25.0 26.2 25.0   BUN mg/dL 13.0 12.0 13.0 15.0   CREATININE mg/dL 0.74 0.67 0.54* 0.94   GLUCOSE mg/dL 85 95 96 97   Estimated Creatinine Clearance: 93 mL/min (by C-G formula based on SCr of 0.74 mg/dL).  Results from last 7 days   Lab Units 06/16/25 0743 06/15/25  0548 06/14/25  0540 06/13/25  0418   ALBUMIN g/dL 3.0* 3.1* 3.0* 2.9*   BILIRUBIN mg/dL <0.2 <0.2 <0.2 <0.2   ALK PHOS U/L 115 119* 103 107   AST (SGOT) U/L 24 24 20 31   ALT (SGPT) U/L 40* 36* 38* 41*     Results from last 7 days   Lab Units 06/16/25 0743 06/15/25  0548 06/14/25  0540 06/13/25  0418   CALCIUM mg/dL 8.7 8.2* 8.2* 8.3*   ALBUMIN g/dL 3.0* 3.1* 3.0* 2.9*   MAGNESIUM mg/dL  --   --  2.0  --    PHOSPHORUS mg/dL  --   --  4.0  --      Results from last 7 days   Lab Units 06/10/25  2243   LIPASE U/L 20             Invalid input(s): \"LDLCALC\"  Results from last 7 days   Lab Units 06/11/25  1549 06/11/25  1547 06/11/25  0856 06/10/25  2359 06/10/25  2353   BLOODCX   --   --   --  No growth at 5 days No growth at 5 days   BODYFLDCX   --   --  No growth at 5 days  --   --    WOUNDCX  No growth at 3 days No growth at 3 days  --   --   --          Test Results Pending at Discharge       Discharge Details        Discharge Medications        New Medications        Instructions Start Date "   Acetaminophen Extra Strength 500 MG tablet  Commonly known as: TYLENOL   Take 2 tablets by mouth Every 8 (Eight) Hours As Needed for Moderate Pain or Mild Pain.      cefTRIAXone 2,000 mg in sodium chloride 0.9 % 100 mL IVPB   2,000 mg, Intravenous, Every 24 Hours      HYDROcodone-acetaminophen 5-325 MG per tablet  Commonly known as: NORCO   Take 1 tablet by mouth Every 6 (Six) Hours As Needed for Mild Pain.      Lactobacillus tablet   1 tablet, Oral, Daily      pantoprazole 40 MG EC tablet  Commonly known as: PROTONIX   40 mg, Oral, Daily      Senexon-S 8.6-50 MG per tablet  Generic drug: sennosides-docusate   Take 2 tablets by mouth Daily As Needed for Constipation.      vancomycin 1250 mg in sodium chloride 0.9% 250 mL (CD)   1,250 mg, Intravenous, Every 12 Hours             Continue These Medications        Instructions Start Date   Aspirin Low Dose 81 MG EC tablet  Generic drug: aspirin   81 mg, Oral, Daily      [employer] TRIPLE MAGNESIUM COMPLEX PO   300 mg, Nightly      multivitamin with minerals tablet tablet   1 tablet, Daily             Stop These Medications      amoxicillin 500 MG capsule  Commonly known as: AMOXIL     clopidogrel 75 MG tablet  Commonly known as: PLAVIX              Allergies   Allergen Reactions    Sulfa Antibiotics Hives       Discharge Disposition:  Home-Health Care Svc      Discharge Diet:  No active diet order      Discharge Activity:       CODE STATUS:    Code Status and Medical Interventions: CPR (Attempt to Resuscitate); Full Support   Ordered at: 06/11/25 0120     Code Status (Patient has no pulse and is not breathing):    CPR (Attempt to Resuscitate)     Medical Interventions (Patient has pulse or is breathing):    Full Support       Future Appointments   Date Time Provider Department Center   7/11/2025 10:50 AM Aravind Ellsworth DO [facility]   8/20/2025 11:00 AM Shadi Anguiano MD [id number]   1/14/2026  2:00 PM Rossana Pryor APRN [facility]   2/23/2026  12:20 PM Mahin Crooks MD [id number]     Additional Instructions for the Follow-ups that You Need to Schedule       Ambulatory Referral to Home Health   As directed      Notify the office if dressing becomes dislodged or saturated. Do not change unless instructed by provider    Order Comments: Notify the office if dressing becomes dislodged or saturated. Do not change unless instructed by provider    Face to Face Visit Date: 6/13/2025   Follow-up provider for Plan of Care?: I will be treating the patient on an ongoing basis.  Please send me the Plan of Care for signature.   Follow-up provider: BOBBY OLIVARES [[id number]]   Reason/Clinical Findings: Postsurgical   Describe mobility limitations that make leaving home difficult: Patient requires the assistance of another to leave home   Nursing/Therapeutic Services Requested: Physical Therapy   PT orders: Total joint pathway   Frequency: 1 Week 1        Ambulatory Referral to Home Health   As directed      Patient will need weekly CBC with differential, creatinine, and CRP faxed to Dr. Ellsworth at [phone].    Order Comments: Patient will need weekly CBC with differential, creatinine, and CRP faxed to Dr. Ellsworth at [phone].    Face to Face Visit Date: 6/14/2025   Follow-up provider for Plan of Care?: I treated the patient in an acute care facility and will not continue treatment after discharge.   Follow-up provider: GARRICK LARKIN [[id number]]   Reason/Clinical Findings: Left hip culture native septic arthritis   Describe mobility limitations that make leaving home difficult: Decreased mobility secondary to left hip pain, septic arthritis.   Nursing/Therapeutic Services Requested: Physical Therapy Skilled Nursing   Skilled nursing orders: Infusion therapy (Lab draws) Wound care dressing/changes   PT orders: Therapeutic exercise Gait Training Transfer training Strengthening   Weight Bearing Status: As Tolerated   Frequency: 1 Week 1        Discharge  Follow-up with Specialty: Orthopedics; 2 Weeks   As directed      Specialty: Orthopedics   Follow Up: 2 Weeks   Follow Up Details: Return to the office to see Dr. Ammon Aly               Contact information for follow-up providers       Sintia Kemp APRN. Schedule an appointment as soon as possible for a visit in 1 week(s).    Specialties: Family Medicine, Nurse Practitioner  Contact information:  [address]  [place] [address]  [phone]               Ammon Aly MD. Schedule an appointment as soon as possible for a visit in 1 week(s).    Specialty: Orthopedic Surgery  Contact information:  [address]  [place] [address]  [phone]               Aravind Ellsworth,  Follow up on 7/11/2025.    Specialty: Infectious Diseases  Contact information:  [address]  [place] [address]  [phone]               Deven Tristan MD. Schedule an appointment as soon as possible for a visit in 1 month(s).    Specialty: Gastroenterology  Contact information:  [address]  [place] [facility] [address]  [phone]                       Contact information for after-discharge care       Durable Medical Equipment       [facility] .    Service: Durable Medical Equipment  Contact information:  [address]  [facility] [address]  [phone]                                   Additional Instructions for the Follow-ups that You Need to Schedule       Ambulatory Referral to Home Health   As directed      Notify the office if dressing becomes dislodged or saturated. Do not change unless instructed by provider    Order Comments: Notify the office if dressing becomes dislodged or saturated. Do not change unless instructed by provider    Face to Face Visit Date: 6/13/2025   Follow-up provider for Plan of Care?: I will be treating the patient on an ongoing basis.  Please send me the Plan of Care for  signature.   Follow-up provider: AMMON OLIVARES [[id number]]   Reason/Clinical Findings: Postsurgical   Describe mobility limitations that make leaving home difficult: Patient requires the assistance of another to leave home   Nursing/Therapeutic Services Requested: Physical Therapy   PT orders: Total joint pathway   Frequency: 1 Week 1        Ambulatory Referral to Home Health   As directed      Patient will need weekly CBC with differential, creatinine, and CRP faxed to Dr. Ellsworth at [phone].    Order Comments: Patient will need weekly CBC with differential, creatinine, and CRP faxed to Dr. Ellsworth at [phone].    Face to Face Visit Date: 6/14/2025   Follow-up provider for Plan of Care?: I treated the patient in an acute care facility and will not continue treatment after discharge.   Follow-up provider: GARRICK LARKIN [[id number]]   Reason/Clinical Findings: Left hip culture native septic arthritis   Describe mobility limitations that make leaving home difficult: Decreased mobility secondary to left hip pain, septic arthritis.   Nursing/Therapeutic Services Requested: Physical Therapy Skilled Nursing   Skilled nursing orders: Infusion therapy (Lab draws) Wound care dressing/changes   PT orders: Therapeutic exercise Gait Training Transfer training Strengthening   Weight Bearing Status: As Tolerated   Frequency: 1 Week 1        Discharge Follow-up with Specialty: Orthopedics; 2 Weeks   As directed      Specialty: Orthopedics   Follow Up: 2 Weeks   Follow Up Details: Return to the office to see Dr. Ammon Olivares            Time Spent on Discharge:  Greater than 30 minutes      Dustin Cerna MD  [facility]  06/16/25  18:23 EDT

## 2025-06-16 NOTE — PLAN OF CARE
Goal Outcome Evaluation:  Plan of Care Reviewed With: patient        Progress: improving  Outcome Evaluation: Alert and oriented, room air and ambulates with a walker in the hallway and to the bathroom via stanby assist. Picc line intact for home abx infusion. Possibly DC today. GI recommended outpatient EGD colonoscopy in 2 to 4 weeks.

## 2025-06-16 NOTE — CASE MANAGEMENT/SOCIAL WORK
Continued Stay Note  [facility]     Patient Name: Linda Castano  MRN: [id number]  Today's Date: 6/16/2025    Admit Date: 6/10/2025    Plan: home with IV antibiotics with [facility]   Discharge Plan       Row Name 06/16/25 1040       Plan    Plan home with IV antibiotics with [facility]    Plan Comments Call from [facility] to inform of d/c today. Pt will come to their outpatient clinic for PICC line care.                   Discharge Codes    No documentation.                 Expected Discharge Date and Time       Expected Discharge Date Expected Discharge Time    Jun 16, 2025               MACIEJ Stiles

## 2025-06-17 ENCOUNTER — TELEPHONE (OUTPATIENT)
Dept: INFECTIOUS DISEASES | Facility: CLINIC | Age: 55
End: 2025-06-17
Payer: COMMERCIAL

## 2025-06-17 ENCOUNTER — TELEPHONE (OUTPATIENT)
Dept: ORTHOPEDIC SURGERY | Facility: CLINIC | Age: 55
End: 2025-06-17
Payer: COMMERCIAL

## 2025-06-17 LAB
MDC_IDC_PG_IMPLANT_DTM: NORMAL
MDC_IDC_PG_MFG: NORMAL
MDC_IDC_PG_MODEL: NORMAL
MDC_IDC_PG_SERIAL: NORMAL
MDC_IDC_PG_TYPE: NORMAL
MDC_IDC_SESS_DTM: NORMAL
MDC_IDC_SESS_TYPE: NORMAL

## 2025-06-17 NOTE — TELEPHONE ENCOUNTER
Patient called about making her 1st PO appointment. Her paperwork said to follow up with you in a week, which would be this week. She's closer to the EP office. I could add her today if you like. Also, she's concerned about a burning sensation at the incision sight as well as when to take her vancomycin. She was told to take it at 7a and 7p, but would like to take it at 9a and 9p if she can.

## 2025-06-17 NOTE — OUTREACH NOTE
Prep Survey      Flowsheet Row Responses   [facility] facility patient discharged from? [facility]   Is LACE score < 7 ? No   Eligibility Readm Mgmt   Discharge diagnosis Acute hip pain, left,  HIP INCISION AND DRAINAGE   Does the patient have one of the following disease processes/diagnoses(primary or secondary)? General Surgery   Does the patient have Home health ordered? Yes   What is the Home health agency?  [facility]x [facility] for PICC line care   Is there a DME ordered? Yes   What DME was ordered? walker   Prep survey completed? Yes            Bijal DEJESUS - Registered Nurse

## 2025-06-17 NOTE — TELEPHONE ENCOUNTER
FYI: Could you please addend your note on 6/15/25 for this patient and add a Vanco Trough weekly to the weekly labs Dr. Ellsworth will need. Thank you

## 2025-06-17 NOTE — PAYOR COMM NOTE
"Eyad Linda LLUVIA (54 y.o. Female)    PLEASE SEE ATTACHED FOR DC NOTICE   ALSO ATTACHED ADD CLIN I RECD AUTH APPROVAL FROM 6/11-6/14 NEED APPROVAL THROUGH 6/16  REF#[id number]   THANK YOU  OSORIO SON RN/UM DEPT   [facility]  PH: [phone]  FAX:  [phone]     Date of Birth   1970    Social Security Number       Address   [address] [place] [address]    Home Phone   [phone]    MRN   [id number]       Sabianist   Lutheran    Marital Status                               Admission Date   6/10/2025    Admission Type   Emergency    Admitting Provider   Nik Galvin MD    Attending Provider       Department, Room/Bed   [address], P792/1       Discharge Date   6/16/2025    Discharge Disposition   [place] [facility] [place] Destination                                 Attending Provider: (none)   Allergies: Sulfa Antibiotics    Isolation: None   Infection: None   Code Status: Prior    Ht: 157.5 cm (62\")   Wt: 94.3 kg (207 lb 14.3 oz)    Admission Cmt: None   Principal Problem: Acute hip pain, left [M25.552]                   Active Insurance as of 6/10/2025       Primary Coverage       Payor Plan Insurance Group Employer/Plan Group    [employer] CIGADENIKE [id number]       Payor Plan Address Payor Plan Phone Number Payor Plan Fax Number Effective Dates    [address] [phone]  1/1/2025 - None Entered    [facility] [id number]         Subscriber Name Subscriber Birth Date Member ID       Nik Castano 1970 [id number]                     Emergency Contacts        (Rel.) Home Phone Work Phone Mobile Phone    Derek Castano (Spouse) [phone] -- --              [place]: NPI [id number]  Tax ID [id number]  Medication Administration Report for Linda Castano as of 6/13/25 through 6/16/25     Legend:    Given Hold Not Given Due Canceled Entry Other Actions    Time Time (Time) Time Time-Action         Discontinued "     Completed     Future     MAR Hold     Linked             Medications 06/13/25 06/14/25 06/15/25 06/16/25     Completed Medications   ethyl alcohol 62 % 2 each  Dose: 2 Swab  Freq: Once Route: NA  Start: 06/11/25 1459 End: 06/11/25 1502     Admin Instructions:   Administer 15-60 minutes prior to surgery following these steps: Clean nostrils with a tissue, apply a 62% ethyl alcohol swab to the right nostril gently rotating the swab for 30 seconds, repeat the process with the 2nd swab in the left nostril.            famotidine (PEPCID) injection 20 mg  Dose: 20 mg  Freq: Once Route: IV  Start: 06/11/25 1331 End: 06/11/25 1356     Admin Instructions:   Give IV push over 2 minutes.            iopamidol (ISOVUE-370) 76 % injection 100 mL  Dose: 100 mL  Freq: Once in Imaging Route: IV  Start: 06/10/25 2339 End: 06/10/25 2325            ketorolac (TORADOL) injection 15 mg  Dose: 15 mg  Freq: Once Route: IV  Start: 06/10/25 2350 End: 06/10/25 2349     Admin Instructions:   (BK)    If given for pain, use the following pain scale:  Mild Pain = Pain Score of 1-3, CPOT 1-2  Moderate Pain = Pain Score of 4-6, CPOT 3-4  Severe Pain = Pain Score of 7-10, CPOT 5-8            lidocaine (XYLOCAINE) 1 % injection 10 mL  Dose: 10 mL  Freq: Once Route: INFILTRATION  Start: 06/11/25 0945 End: 06/11/25 0840            meloxicam (MOBIC) tablet 7.5 mg  Dose: 7.5 mg  Freq: Once Route: PO  Start: 06/11/25 1459 End: 06/11/25 1500     Admin Instructions:   Take with food. Based on patient request - if ordered for moderate or severe pain, provider allows for administration of a medication prescribed for a lower pain scale.  If given for pain, use the following pain scale:  Mild Pain = Pain Score of 1-3, CPOT 1-2  Moderate Pain = Pain Score of 4-6, CPOT 3-4  Severe Pain = Pain Score of 7-10, CPOT 5-8            morphine injection 4 mg  Dose: 4 mg  Freq: Once Route: IV  Start: 06/10/25 2236 End: 06/10/25 2249     Admin Instructions:   Based on  "patient request - if ordered for moderate or severe pain, provider allows for administration of a medication prescribed for a lower pain scale.  If given for pain, use the following pain scale:  Mild Pain = Pain Score of 1-3, CPOT 1-2  Moderate Pain = Pain Score of 4-6, CPOT 3-4  Severe Pain = Pain Score of 7-10, CPOT 5-8            ondansetron (ZOFRAN) injection 4 mg  Dose: 4 mg  Freq: Once Route: IV  Start: 06/10/25 2236 End: 06/10/25 2248     Admin Instructions:   \"If multiple N/V medications ordered, use in the following order: Ondansetron, Prochlorperazine, Promethazine. Use PO unless patient refuses or patient unable to swallow.\"            pregabalin (LYRICA) capsule 75 mg  Dose: 75 mg  Freq: Once Route: PO  Start: 06/11/25 1459 End: 06/11/25 1500     Admin Instructions:   (MITZI)            vancomycin IVPB 2000 mg in 0.9% Sodium Chloride 500 mL  Dose: 20 mg/kg  Weight Dosing Info: 94.3 kg  Freq: Once Route: IV  Indications of Use: SKIN AND SOFT TISSUE INFECTION  Start: 06/11/25 1200 End: 06/11/25 1504            Discontinued Medications  Medications 06/13/25 06/14/25 06/15/25 06/16/25       acetaminophen (TYLENOL) tablet 650 mg  Dose: 650 mg  Freq: Every 4 Hours PRN Route: PO  PRN Reason: Mild Pain  Start: 06/11/25 0119 End: 06/16/25 1638     Admin Instructions:   If given for fever, use fever parameter: fever greater than 100.4 °F  Based on patient request - if ordered for moderate or severe pain, provider allows for administration of a medication prescribed for a lower pain scale.    Do not exceed 4 grams of acetaminophen in a 24 hr period. Max dose of 2gm for AST/ALT greater than 120 units/L.    If given for pain, use the following pain scale:   Mild Pain = Pain Score of 1-3, CPOT 1-2  Moderate Pain = Pain Score of 4-6, CPOT 3-4  Severe Pain = Pain Score of 7-10, CPOT 5-8            Or   acetaminophen (TYLENOL) 160 MG/5ML oral solution 650 mg  Dose: 650 mg  Freq: Every 4 Hours PRN Route: PO  PRN Reason: " Mild Pain  Start: 06/11/25 0119 End: 06/16/25 1638     Admin Instructions:   If given for fever, use fever parameter: fever greater than 100.4 °F  Based on patient request - if ordered for moderate or severe pain, provider allows for administration of a medication prescribed for a lower pain scale.    Do not exceed 4 grams of acetaminophen in a 24 hr period. Max dose of 2gm for AST/ALT greater than 120 units/L.    If given for pain, use the following pain scale:   Mild Pain = Pain Score of 1-3, CPOT 1-2  Moderate Pain = Pain Score of 4-6, CPOT 3-4  Severe Pain = Pain Score of 7-10, CPOT 5-8            Or   acetaminophen (TYLENOL) suppository 650 mg  Dose: 650 mg  Freq: Every 4 Hours PRN Route: RE  PRN Reason: Mild Pain  Start: 06/11/25 0119 End: 06/16/25 1638     Admin Instructions:   If given for fever, use fever parameter: fever greater than 100.4 °F  Based on patient request - if ordered for moderate or severe pain, provider allows for administration of a medication prescribed for a lower pain scale.    Do not exceed 4 grams of acetaminophen in a 24 hr period. Max dose of 2gm for AST/ALT greater than 120 units/L.    If given for pain, use the following pain scale:   Mild Pain = Pain Score of 1-3, CPOT 1-2  Moderate Pain = Pain Score of 4-6, CPOT 3-4  Severe Pain = Pain Score of 7-10, CPOT 5-8            acetaminophen (TYLENOL) tablet 325 mg  Dose: 325 mg  Freq: Every 4 Hours PRN Route: PO  PRN Reason: Fever  PRN Comment: Temperature Greater Than 101F  Start: 06/11/25 1809 End: 06/16/25 1638     Admin Instructions:   Based on patient request - if ordered for moderate or severe pain, provider allows for administration of a medication prescribed for a lower pain scale.    Do not exceed 4 grams of acetaminophen in a 24 hr period. Max dose of 2gm for AST/ALT greater than 120 units/L.    If given for pain, use the following pain scale:   Mild Pain = Pain Score of 1-3, CPOT 1-2  Moderate Pain = Pain Score of 4-6, CPOT  3-4  Severe Pain = Pain Score of 7-10, CPOT 5-8            aspirin EC tablet 81 mg  Dose: 81 mg  Freq: Every 12 Hours Scheduled Route: PO  Indications of Use: PROPHYLAXIS OF VENOUS THROMBOEMBOLISM  Indications Comment: VTE Prophylaxis  Start: 06/14/25 1115 End: 06/16/25 1638     Admin Instructions:   Do not exceed 4 grams of aspirin in a 24 hr period.    If given for pain, use the following pain scale:   Mild Pain = Pain Score of 1-3, CPOT 1-2  Moderate Pain = Pain Score of 4-6, CPOT 3-4  Severe Pain = Pain Score of 7-10, CPOT 5-8       1130-Given     1851-Held by provider       2100-Held by provider         0738-Unheld by provider     0821-Given       2009-Given         0815-Given           aspirin EC tablet 81 mg  Dose: 81 mg  Freq: Every 12 Hours Scheduled Route: PO  Indications of Use: PROPHYLAXIS OF VENOUS THROMBOEMBOLISM  Indications Comment: VTE Prophylaxis  Start: 06/12/25 0900 End: 06/14/25 1024     Admin Instructions:   Do not exceed 4 grams of aspirin in a 24 hr period.    If given for pain, use the following pain scale:   Mild Pain = Pain Score of 1-3, CPOT 1-2  Moderate Pain = Pain Score of 4-6, CPOT 3-4  Severe Pain = Pain Score of 7-10, CPOT 5-8      0852-Given     0916-Held by provider       2100-Held by provider         0900-Held by provider     1024-Unheld by provider            Atropine Sulfate (PF) injection 0.4 mg  Dose: 0.4 mg  Freq: Once As Needed Route: IV  PRN Reason: Bradycardia  PRN Comment: symptomatic bradycardia (hypotension, dizziness, confusion). Notify attending anesthesiologist.  Start: 06/11/25 1729 End: 06/11/25 1809             sennosides-docusate (PERICOLACE) 8.6-50 MG per tablet 2 tablet  Dose: 2 tablet  Freq: 2 Times Daily PRN Route: PO  PRN Reason: Constipation  Start: 06/11/25 0119 End: 06/16/25 1638     Admin Instructions:   Start bowel management regimen if patient has not had a bowel movement after 12 hours.       1012-Given             And   polyethylene glycol (MIRALAX)  packet 17 g  Dose: 17 g  Freq: Daily PRN Route: PO  PRN Reason: Constipation  PRN Comment: Use if senna-docusate is ineffective  Start: 06/11/25 0119 End: 06/16/25 1638     Admin Instructions:   Use if no bowel movement after 12 hours. Mix in 6-8 ounces of water.  Use 4-8 ounces of water, tea, or juice for each 17 gram dose.       1011-Given             And   bisacodyl (DULCOLAX) EC tablet 5 mg  Dose: 5 mg  Freq: Daily PRN Route: PO  PRN Reason: Constipation  PRN Comment: Use if polyethylene glycol is ineffective  Start: 06/11/25 0119 End: 06/16/25 1638     Admin Instructions:   Use if no bowel movement after 12 hours.  Swallow whole. Do not crush, split, or chew tablet.            And   bisacodyl (DULCOLAX) suppository 10 mg  Dose: 10 mg  Freq: Daily PRN Route: RE  PRN Reason: Constipation  PRN Comment: Use if bisacodyl oral is ineffective  Start: 06/11/25 0119 End: 06/16/25 1638     Admin Instructions:   Use if no bowel movement after 12 hours.  Hold for diarrhea            bisacodyl (DULCOLAX) suppository 10 mg  Dose: 10 mg  Freq: Daily PRN Route: RE  PRN Reason: Constipation  Start: 06/11/25 1809 End: 06/16/25 1638     Admin Instructions:   Hold for diarrhea            calcium carbonate (TUMS) chewable tablet 500 mg (200 mg elemental)  Dose: 2 tablet  Freq: 2 Times Daily PRN Route: PO  PRN Reason: Heartburn  Start: 06/11/25 0119 End: 06/16/25 1638     Admin Instructions:   One tablet contains 200 mg elemental calcium.  Take with food.            ceFAZolin 2000 mg IVPB in 100 mL NS (MBP)  Dose: 2 g  Freq: Once Route: IV  Indications of Use: PERIOPERATIVE PHARMACOPROPHYLAXIS  Start: 06/11/25 1459 End: 06/11/25 1540     Admin Instructions:   Administer within 1 hour of surgical incision. Redose 4 hours from pre-op dose if procedure ongoing or >1.5 L blood loss.  *if pt >120 kg then give 3gm ancef  * if pt PCn allergic give clindamycin instead of ancef plus vancomycin per weight based pre op protocol if  ordered  Caution: Look alike/sound alike drug alert            cefTRIAXone (ROCEPHIN) 2,000 mg in sodium chloride 0.9 % 100 mL MBP  Dose: 2,000 mg  Freq: Every 24 Hours Route: IV  Indications of Use: SKIN AND SOFT TISSUE INFECTION  Start: 06/14/25 1130 End: 06/16/25 1638     Admin Instructions:   LR should be paused and flushing of the line with NS is recommended prior to and after completion of ceftriaxone infusion due to incompatibility. Do not co-adminster with calcium-containing solutions.  Caution: Look alike/sound alike drug alert       1130-New Bag     1145-Stopped        1133-New Bag         1155-New Bag           cefTRIAXone (ROCEPHIN) 2,000 mg in sodium chloride 0.9 % 100 mL MBP  Dose: 2,000 mg  Freq: Every 24 Hours Route: IV  Indications of Use: SKIN AND SOFT TISSUE INFECTION  Start: 06/11/25 1130 End: 06/14/25 0906     Admin Instructions:   LR should be paused and flushing of the line with NS is recommended prior to and after completion of ceftriaxone infusion due to incompatibility. Do not co-adminster with calcium-containing solutions.  Caution: Look alike/sound alike drug alert      1207-New Bag              cefTRIAXone (ROCEPHIN) 2,000 mg in sodium chloride 0.9 % 100 mL MBP  Dose: 2,000 mg  Freq: Every 24 Hours Route: IV  Indications of Use: SKIN AND SOFT TISSUE INFECTION  Start: 06/11/25 1115 End: 06/11/25 1031     Admin Instructions:   LR should be paused and flushing of the line with NS is recommended prior to and after completion of ceftriaxone infusion due to incompatibility. Do not co-adminster with calcium-containing solutions.  Caution: Look alike/sound alike drug alert            diphenhydrAMINE (BENADRYL) injection 12.5 mg  Dose: 12.5 mg  Freq: Every 15 Minutes PRN Route: IV  PRN Reason: Itching  PRN Comment: May repeat x 1  Start: 06/11/25 1729 End: 06/11/25 1809     Admin Instructions:   Caution: Look alike/sound alike drug alert. This med may be ordered in other forms and routes. Before  giving verify the last time the drug was given by any route/form.            docusate sodium (COLACE) capsule 100 mg  Dose: 100 mg  Freq: 2 Times Daily Route: PO  Start: 06/11/25 2100 End: 06/16/25 1638     Admin Instructions:   Swallow whole.  Do not open, crush, or chew capsule.      0852-Given     0917-Held by provider       2100-Held by provider         0900-Held by provider     2100-Held by provider        0900-Held by provider     2100-Held by provider        0900-Held by provider     1638-Unheld by provider           droperidol (INAPSINE) injection 0.625 mg  Dose: 0.625 mg  Freq: Every 20 Minutes PRN Route: IV  PRN Reasons: Nausea,Vomiting  Indications of Use: NAUSEA AND VOMITING  Start: 06/11/25 1729 End: 06/11/25 1809     Admin Instructions:   Use ondansetron first; proceed to droperidol for nausea refractory to ondansetron.            Or   droperidol (INAPSINE) injection 0.625 mg  Dose: 0.625 mg  Freq: Every 20 Minutes PRN Route: IM  PRN Reasons: Nausea,Vomiting  Start: 06/11/25 1729 End: 06/11/25 1809     Admin Instructions:   Use ondansetron first; proceed to droperidol for nausea refractory to ondansetron.            ePHEDrine injection 5 mg  Dose: 5 mg  Freq: Once As Needed Route: IV  PRN Comment: symptomatic hypotension - Notify attending anesthesiologist if this needs to be given  Start: 06/11/25 1729 End: 06/11/25 1809     Admin Instructions:   Caution: Look alike/sound alike drug alert   Dilute with NS to 5-10 mg/mL.  Central line preferred, if unavailable use large bore IV access with frequent nurse monitoring of IV site.            fentaNYL citrate (PF) (SUBLIMAZE) injection 50 mcg  Dose: 50 mcg  Freq: Every 5 Minutes PRN Route: IV  PRN Reason: Severe Pain  Start: 06/11/25 1729 End: 06/11/25 1809     Admin Instructions:   Maximum total dose of fentanyl is 200 mcg.  If given for pain, use the following pain scale:  Mild Pain = Pain Score of 1-3, CPOT 1-2  Moderate Pain = Pain Score of 4-6, CPOT  3-4  Severe Pain = Pain Score of 7-10, CPOT 5-8            fentaNYL citrate (PF) (SUBLIMAZE) injection 50 mcg  Dose: 50 mcg  Freq: Once As Needed Route: IV  PRN Reason: Severe Pain  Start: 06/11/25 1329 End: 06/11/25 1653     Admin Instructions:   Maximum total dose of fentanyl is 50 mcg without additional orders from physician. May be used for preoperative pain or procedural sedation.  If given for pain, use the following pain scale:  Mild Pain = Pain Score of 1-3, CPOT 1-2  Moderate Pain = Pain Score of 4-6, CPOT 3-4  Severe Pain = Pain Score of 7-10, CPOT 5-8            flumazenil (ROMAZICON) injection 0.2 mg  Dose: 0.2 mg  Freq: As Needed Route: IV  PRN Comment: for benzodiazepine induced unresponsiveness or sedation  Start: 06/11/25 1729 End: 06/11/25 1809     Admin Instructions:   Notify Anesthesia if given  ** give IV over 15-30 seconds **            hydrALAZINE (APRESOLINE) injection 5 mg  Dose: 5 mg  Freq: Every 10 Minutes PRN Route: IV  PRN Reason: High Blood Pressure  PRN Comment: for systolic blood pressure greater than 180 mmHg or diastolic blood pressure greater than 105 mmHg  Start: 06/11/25 1729 End: 06/11/25 1809     Admin Instructions:   Up to 20 mg. If labetalol and hydralazine are both ordered, use labetalol first.  Caution: Look alike/sound alike drug alert            HYDROcodone-acetaminophen (NORCO) 5-325 MG per tablet 1 tablet  Dose: 1 tablet  Freq: Once As Needed Route: PO  PRN Reason: Moderate Pain  Start: 06/11/25 1729 End: 06/11/25 1809     Admin Instructions:   Based on patient request - if ordered for moderate or severe pain, provider allows for administration of a medication prescribed for a lower pain scale.  [MITZI]    Do not exceed 4 grams of acetaminophen in a 24 hr period. Max dose of 2gm for AST/ALT greater than 120 units/L        If given for pain, use the following pain scale:   Mild Pain = Pain Score of 1-3, CPOT 1-2  Moderate Pain = Pain Score of 4-6, CPOT 3-4  Severe Pain =  Pain Score of 7-10, CPOT 5-8            HYDROcodone-acetaminophen (NORCO) 5-325 MG per tablet 1 tablet  Dose: 1 tablet  Freq: Every 6 Hours PRN Route: PO  PRN Reason: Mild Pain  Start: 06/11/25 0119 End: 06/16/25 1638     Admin Instructions:   Based on patient request - if ordered for moderate or severe pain, provider allows for administration of a medication prescribed for a lower pain scale.  [MITZI]    Do not exceed 4 grams of acetaminophen in a 24 hr period. Max dose of 2gm for AST/ALT greater than 120 units/L        If given for pain, use the following pain scale:   Mild Pain = Pain Score of 1-3, CPOT 1-2  Moderate Pain = Pain Score of 4-6, CPOT 3-4  Severe Pain = Pain Score of 7-10, CPOT 5-8      0614-Given     1439-Given        0859-Given     2115-Given        0426-Given     1614-Return to Cabinet        1356-Given           HYDROmorphone (DILAUDID) injection 0.5 mg  Dose: 0.5 mg  Freq: Every 5 Minutes PRN Route: IV  PRN Reason: Moderate Pain  Start: 06/11/25 1729 End: 06/11/25 1809     Admin Instructions:   Maximum total dose of hydromorphone is 2 mg.  If given for pain, use the following pain scale:  Mild Pain = Pain Score of 1-3, CPOT 1-2  Moderate Pain = Pain Score of 4-6, CPOT 3-4  Severe Pain = Pain Score of 7-10, CPOT 5-8            ipratropium-albuterol (DUO-NEB) nebulizer solution 3 mL  Dose: 3 mL  Freq: Once As Needed Route: NEBULIZATION  PRN Reasons: Wheezing,Shortness of Air  PRN Comment: bronchospasm  Start: 06/11/25 1729 End: 06/11/25 1809     Admin Instructions:   Notify Anesthesia if minineb is given            ketorolac (TORADOL) injection 15 mg  Dose: 15 mg  Freq: Every 6 Hours PRN Route: IV  PRN Reason: Moderate Pain  Start: 06/11/25 1809 End: 06/14/25 1024     Admin Instructions:   Max 4 doses  (BKC)    If given for pain, use the following pain scale:  Mild Pain = Pain Score of 1-3, CPOT 1-2  Moderate Pain = Pain Score of 4-6, CPOT 3-4  Severe Pain = Pain Score of 7-10, CPOT 5-8             labetalol (NORMODYNE,TRANDATE) injection 5 mg  Dose: 5 mg  Freq: Every 5 Minutes PRN Route: IV  PRN Reason: High Blood Pressure  PRN Comment: for systolic blood pressure greater than 180 mmHg or diastolic blood pressure greater than 105 mmHg  Start: 06/11/25 1729 End: 06/11/25 1809     Admin Instructions:   Hold for heart rate less than 60.    If labetalol and hydralazine are both ordered, use labetalol first. Maximum dose of labetalol is 20mg IV while in PACU, notify anesthesiologist for further orders if needed.  Give IV Push over 2 minutes.            lactated ringers infusion  Rate: 9 mL/hr Dose: 9 mL/hr  Freq: Continuous Route: IV  Start: 06/11/25 1331 End: 06/12/25 1344            lactated ringers infusion  Rate: 75 mL/hr Dose: 75 mL/hr  Freq: Continuous Route: IV  Start: 06/11/25 1145 End: 06/13/25 0911      0911 (Order Ending) [C]              lidocaine (XYLOCAINE) 1 % injection 0.5 mL  Dose: 0.5 mL  Freq: Once As Needed Route: ID  PRN Comment: IV Start  Start: 06/11/25 1329 End: 06/11/25 1653            magnesium hydroxide (MILK OF MAGNESIA) suspension 10 mL  Dose: 10 mL  Freq: Daily PRN Route: PO  PRN Reason: Constipation  Start: 06/11/25 1809 End: 06/16/25 1638            meloxicam (MOBIC) tablet 15 mg  Dose: 15 mg  Freq: Daily Route: PO  Indications of Use: OSTEOARTHRITIS  Start: 06/12/25 0900 End: 06/16/25 1638     Admin Instructions:   Hold if Cr > 1.1 , or H/O NSAID allergy/ intolerance  If given for pain, use the following pain scale:  Mild Pain = Pain Score of 1-3, CPOT 1-2  Moderate Pain = Pain Score of 4-6, CPOT 3-4  Severe Pain = Pain Score of 7-10, CPOT 5-8      0852-Given     0917-Held by provider        0900-Held by provider         0900-Held by provider         0900-Held by provider     1638-Unheld by provider          midazolam (VERSED) injection 1 mg  Dose: 1 mg  Freq: Every 5 Minutes PRN Route: IV  PRN Comment: Anxiety prophylaxis, Pre-op comfort  Start: 06/11/25 1329 End: 06/11/25  1653     Admin Instructions:   May repeat dose in 5 minutes one time then contact provider for additional orders.  (MITZI)            morphine injection 2 mg  Dose: 2 mg  Freq: Every 3 Hours PRN Route: IV  PRN Reasons: Moderate Pain,Severe Pain  Start: 06/11/25 0119 End: 06/16/25 0118     Admin Instructions:   If given for pain, use the following pain scale:  Mild Pain = Pain Score of 1-3, CPOT 1-2  Moderate Pain = Pain Score of 4-6, CPOT 3-4  Severe Pain = Pain Score of 7-10, CPOT 5-8        2240-Given            naloxone (NARCAN) injection 0.2 mg  Dose: 0.2 mg  Freq: As Needed Route: IV  PRN Reasons: Opioid Reversal,Respiratory Depression  PRN Comment: unresponsiveness, decrease oxygen saturation  Indications of Use: ACUTE RESPIRATORY FAILURE,OPIOID-INDUCED RESPIRATORY DEPRESSION  Start: 06/11/25 1729 End: 06/11/25 1809     Admin Instructions:   Notify Anesthesia if given            ondansetron (ZOFRAN) injection 4 mg  Dose: 4 mg  Freq: Once As Needed Route: IV  PRN Reasons: Nausea,Vomiting  Indications of Use: POSTOPERATIVE NAUSEA AND VOMITING  Start: 06/11/25 1729 End: 06/11/25 1809     Admin Instructions:   If BOTH ondansetron (ZOFRAN) and promethazine (PHENERGAN) are ordered use ondansetron first and THEN promethazine IF ondansetron is ineffective.             ondansetron ODT (ZOFRAN-ODT) disintegrating tablet 4 mg  Dose: 4 mg  Freq: Every 6 Hours PRN Route: PO  PRN Reasons: Nausea,Vomiting  Start: 06/11/25 1809 End: 06/16/25 1638     Admin Instructions:   If BOTH ondansetron (ZOFRAN) and promethazine (PHENERGAN) are ordered use ondansetron first and THEN promethazine IF ondansetron is ineffective.  Place on tongue and allow to dissolve.            Or   ondansetron (ZOFRAN) injection 4 mg  Dose: 4 mg  Freq: Every 6 Hours PRN Route: IV  PRN Reasons: Nausea,Vomiting  Start: 06/11/25 1809 End: 06/16/25 1638     Admin Instructions:   If BOTH ondansetron (ZOFRAN) and promethazine (PHENERGAN) are ordered use  ondansetron first and THEN promethazine IF ondansetron is ineffective.             ondansetron ODT (ZOFRAN-ODT) disintegrating tablet 4 mg  Dose: 4 mg  Freq: Every 6 Hours PRN Route: PO  PRN Reasons: Nausea,Vomiting  Start: 06/11/25 0119 End: 06/16/25 1638     Admin Instructions:   If BOTH ondansetron (ZOFRAN) and promethazine (PHENERGAN) are ordered use ondansetron first and THEN promethazine IF ondansetron is ineffective.  Place on tongue and allow to dissolve.            Or   ondansetron (ZOFRAN) injection 4 mg  Dose: 4 mg  Freq: Every 6 Hours PRN Route: IV  PRN Reasons: Nausea,Vomiting  Start: 06/11/25 0119 End: 06/16/25 1638     Admin Instructions:   If BOTH ondansetron (ZOFRAN) and promethazine (PHENERGAN) are ordered use ondansetron first and THEN promethazine IF ondansetron is ineffective.            oxyCODONE-acetaminophen (PERCOCET) 7.5-325 MG per tablet 1 tablet  Dose: 1 tablet  Freq: Every 4 Hours PRN Route: PO  PRN Reason: Severe Pain  Start: 06/11/25 1729 End: 06/11/25 1809     Admin Instructions:   [MITZI]    Do not exceed 4 grams of acetaminophen in a 24 hr period. Max dose of 2gm for AST/ALT greater than 120 units/L        If given for pain, use the following pain scale:   Mild Pain = Pain Score of 1-3, CPOT 1-2  Moderate Pain = Pain Score of 4-6, CPOT 3-4  Severe Pain = Pain Score of 7-10, CPOT 5-8            pantoprazole (PROTONIX) EC tablet 40 mg  Dose: 40 mg  Freq: 2 Times Daily Before Meals Route: PO  Start: 06/16/25 0900 End: 06/16/25 1638     Admin Instructions:   Swallow whole; do not crush, split, or chew.         0815-Given           pantoprazole (PROTONIX) EC tablet 40 mg  Dose: 40 mg  Freq: Every Early Morning Route: PO  Start: 06/12/25 1045 End: 06/13/25 0919     Admin Instructions:   Swallow whole; do not crush, split, or chew.      0614-Given     0850-Canceled Entry             pantoprazole (PROTONIX) injection 40 mg  Dose: 40 mg  Freq: 2 Times Daily Before Meals Route: IV  Indications  of Use: GASTROESOPHAGEAL REFLUX DISEASE  Start: 06/13/25 1015 End: 06/16/25 0800     Admin Instructions:   Dilute with 10 mL of 0.9% NaCl and give IV push over 2 minutes.      1710-Given     1712-Canceled Entry        0554-Given     1803-Given        0806-Given     1851-Given        (1144)-Not Given           Pharmacy to dose vancomycin  Freq: Continuous PRN Route: XX  PRN Reason: Consult  Indications of Use: SKIN AND SOFT TISSUE INFECTION  Start: 06/14/25 0905 End: 06/16/25 1638            Pharmacy to dose vancomycin  Freq: Continuous PRN Route: XX  PRN Reason: Consult  Indications of Use: SKIN AND SOFT TISSUE INFECTION  Start: 06/11/25 1040 End: 06/14/25 0906            Pharmacy to dose vancomycin  Freq: Continuous PRN Route: XX  PRN Reason: Consult  Indications of Use: SKIN AND SOFT TISSUE INFECTION  Start: 06/11/25 1027 End: 06/11/25 1031            polyethylene glycol (MIRALAX) packet 17 g  Dose: 17 g  Freq: 2 Times Daily Route: PO  Start: 06/11/25 2100 End: 06/16/25 1638     Admin Instructions:   Mix dose in 6-8 ounces of water.  Use 4-8 ounces of water, tea, or juice for each 17 gram dose.      (0851)-Not Given     0917-Held by provider       2100-Held by provider         0900-Held by provider     2100-Held by provider        0900-Held by provider     2100-Held by provider        0900-Held by provider     1638-Unheld by provider           promethazine (PHENERGAN) suppository 25 mg  Dose: 25 mg  Freq: Once As Needed Route: RE  PRN Reasons: Nausea,Vomiting  Start: 06/11/25 1729 End: 06/11/25 1809     Admin Instructions:   If BOTH ondansetron (ZOFRAN) and promethazine (PHENERGAN) are ordered use ondansetron first and THEN promethazine IF ondansetron is ineffective.            Or   promethazine (PHENERGAN) tablet 25 mg  Dose: 25 mg  Freq: Once As Needed Route: PO  PRN Reasons: Nausea,Vomiting  Start: 06/11/25 1729 End: 06/11/25 1809     Admin Instructions:   If BOTH ondansetron (ZOFRAN) and promethazine  (PHENERGAN) are ordered use ondansetron first and THEN promethazine IF ondansetron is ineffective.  ([facility])            sodium chloride (NS) irrigation solution  Freq: As Needed  Start: 06/11/25 1537 End: 06/11/25 1653            sodium chloride 0.9 % flush 10 mL  Dose: 10 mL  Freq: As Needed Route: IV  PRN Reason: Line Care  PRN Comment: After Medication Administration  Start: 06/14/25 1223 End: 06/16/25 1638            sodium chloride 0.9 % flush 10 mL  Dose: 10 mL  Freq: Every 12 Hours Scheduled Route: IV  Start: 06/14/25 1315 End: 06/16/25 1638       1234-Given     2252-Given        0821-Given     2010-Given        0800-Given           sodium chloride 0.9 % flush 10 mL  Dose: 10 mL  Freq: As Needed Route: IV  PRN Reason: Line Care  Start: 06/11/25 0118 End: 06/16/25 1638            sodium chloride 0.9 % flush 10 mL  Dose: 10 mL  Freq: Every 12 Hours Scheduled Route: IV  Start: 06/11/25 0136 End: 06/16/25 1638      0851-Given     2225-Given        1015-Given     2252-Given        0822-Given     2010-Given        0800-Given           sodium chloride 0.9 % flush 20 mL  Dose: 20 mL  Freq: As Needed Route: IV  PRN Reason: Line Care  PRN Comment: After Blood Draws or Blood Product Administration  Start: 06/14/25 1223 End: 06/16/25 1638            sodium chloride 0.9 % flush 3 mL  Dose: 3 mL  Freq: Every 12 Hours Scheduled Route: IV  Start: 06/11/25 1331 End: 06/11/25 1653            sodium chloride 0.9 % flush 3-10 mL  Dose: 3-10 mL  Freq: As Needed Route: IV  PRN Reason: Line Care  Start: 06/11/25 1329 End: 06/11/25 1653            sodium chloride 0.9 % infusion 40 mL  Dose: 40 mL  Freq: As Needed Route: IV  PRN Reason: Line Care  Start: 06/14/25 1223 End: 06/16/25 1638     Admin Instructions:   Following administration of an IV intermittent medication, flush line with 40mL NS at 100mL/hr.            sodium chloride 0.9 % infusion 40 mL  Dose: 40 mL  Freq: As Needed Route: IV  PRN Reason: Line Care  Start: 06/11/25 0118  End: 25 1638     Admin Instructions:   Following administration of an IV intermittent medication, flush line with 40mL NS at 100mL/hr.            vancomycin (VANCOCIN) 1,000 mg in sodium chloride 0.9 % 250 mL IVPB-VTB  Dose: 1,000 mg  Freq: Every 12 Hours Route: IV  Indications of Use: SKIN AND SOFT TISSUE INFECTION  Start: 25 1800 End: 25 1638       1804-New Bag         0551-New Bag     1851-New Bag        0715-New Bag           vancomycin 750 mg in sodium chloride 0.9 % 250 mL IVPB-VTB  Dose: 750 mg  Freq: Every 8 Hours Route: IV  Indications of Use: SKIN AND SOFT TISSUE INFECTION  Start: 25 2300 End: 25 1534            Vancomycin HCl 1,250 mg in sodium chloride 0.9 % 250 mL VTB  Dose: 1,250 mg  Freq: Every 12 Hours Route: IV  Indications of Use: SKIN AND SOFT TISSUE INFECTION  Start: 25 1230 End: 25 1402      1259-New Bag         0040-New Bag     (1554)-Not Given            Vancomycin HCl 1,250 mg in sodium chloride 0.9 % 250 mL VTB  Dose: 1,250 mg  Freq: Every 12 Hours Route: IV  Indications of Use: SKIN AND SOFT TISSUE INFECTION  Start: 25 1100 End: 25 1127      1207-Canceled Entry              Vancomycin HCl 1,250 mg in sodium chloride 0.9 % 250 mL VTB  Dose: 1,250 mg  Freq: Every 12 Hours Route: IV  Indications of Use: SKIN AND SOFT TISSUE INFECTION  Start: 25 1600 End: 25 1026                           Physician Progress Notes (last 4 days)        Dustin Cerna MD at 06/15/25 1251              Name: Linda Castano ADMIT: 6/10/2025   : 1970  PCP: Sintia Kemp APRN    MRN: [id number] LOS: 4 days   AGE/SEX: 54 y.o. female  ROOM: [facility]     Subjective   Subjective   Patient seen this morning.  No events overnight.  No fevers no chills.  Hip pain stable.  Remains on IV ceftriaxone vancomycin.    Review of Systems  As above  Objective   Objective   Vital Signs  Temp:  [98.2 °F (36.8 °C)-98.4 °F (36.9 °C)] 98.2 °F (36.8  °C)  Heart Rate:  [72-95] 72  Resp:  [16-18] 18  BP: (105-130)/(50-67) 127/67  SpO2:  [97 %-100 %] 98 %  on   ;   Device (Oxygen Therapy): room air  Body mass index is 38.02 kg/m².  Physical Exam    General: Alert, sitting up in the chair, not in distress,  HEENT: Normocephalic, atraumatic  CV: Regular rate and rhythm, no murmurs rubs or gallops  Lungs: Clear to auscultation bilaterally, no crackles or wheezes  Abdomen: Soft, nontender, nondistended  Extremities:     Results Review     I reviewed the patient's new clinical results.  Results from last 7 days   Lab Units 06/15/25  0548 06/14/25  1220 06/14/25  0540 06/13/25  1209 06/13/25 0418 06/12/25  0225 06/11/25  0734   WBC 10*3/mm3 8.32  --  8.19  --  9.59  --  10.60   HEMOGLOBIN g/dL 8.6* 9.8* 8.5* 9.5* 8.5*   < > 10.3*   PLATELETS 10*3/mm3 280  --  283  --  251  --  262    < > = values in this interval not displayed.     Results from last 7 days   Lab Units 06/15/25  0548 06/14/25  0540 06/13/25 0418 06/12/25  0225   SODIUM mmol/L 143 140 144 138   POTASSIUM mmol/L 4.4 4.3 4.9 4.4   CHLORIDE mmol/L 109* 106 109* 108*   CO2 mmol/L 25.0 26.2 25.0 20.8*   BUN mg/dL 12.0 13.0 15.0 13.0   CREATININE mg/dL 0.67 0.54* 0.94 0.74  0.77   GLUCOSE mg/dL 95 96 97 180*   Estimated Creatinine Clearance: 102.7 mL/min (by C-G formula based on SCr of 0.67 mg/dL).  Results from last 7 days   Lab Units 06/15/25  0548 06/14/25  0540 06/13/25  0418 06/10/25  2243   ALBUMIN g/dL 3.1* 3.0* 2.9* 4.0   BILIRUBIN mg/dL <0.2 <0.2 <0.2 0.4   ALK PHOS U/L 119* 103 107 133*   AST (SGOT) U/L 24 20 31 27   ALT (SGPT) U/L 36* 38* 41* 37*     Results from last 7 days   Lab Units 06/15/25  0548 06/14/25  0540 06/13/25  0418 06/12/25  0225 06/11/25  0734 06/10/25  2243   CALCIUM mg/dL 8.2* 8.2* 8.3* 8.2*   < > 9.1   ALBUMIN g/dL 3.1* 3.0* 2.9*  --   --  4.0   MAGNESIUM mg/dL  --  2.0  --   --   --   --    PHOSPHORUS mg/dL  --  4.0  --   --   --   --     < > = values in this interval not  "displayed.     Results from last 7 days   Lab Units 06/10/25  2353   LACTATE mmol/L 0.8   No results found for: \"COVID19\"  No results found for: \"HGBA1C\", \"POCGLU\"        FL Guided Aspiration Joint  Narrative: LEFT HIP FLUOROSCOPICALLY GUIDED JOINT ASPIRATION, 6/11/2025     HISTORY:  54-year-old female with left hip pain. MRI demonstrates moderate to  large left hip effusion. Rule out septic joint.     CONSENT:  Procedure, risks and alternatives were discussed in detail with the  patient, including the low risk of allergy and infection; patient  questions were answered, and informed consent was obtained. Timeout was  observed in the procedure room for patient identification and procedure  site verification, and the site was marked by me.     PROCEDURE:  *  Reference Air Kerma: 2.4 mGy     Using sterile technique, 1% lidocaine local anesthetic and fluoroscopic  guidance, a 22-gauge spinal needle was placed into the right hip joint  capsule without difficulty. Aspiration of approximately 12 mL of cloudy  viscous yellow/orange-colored fluid. The needle was removed and manual  pressure was applied achieving adequate hemostasis. A Band-Aid was  applied. Specimen was sent to laboratory as ordered by the requesting  provider. The patient tolerated the procedure well without incident.        Impression: Technically successful left hip joint aspiration with removal of  approximately 12 mL of cloudy viscous yellow/orange-colored fluid.        Procedure performed by ANABELA Blake.  By electronically signing this report, I, the supervising radiologist,  attest that I was not present for the procedure(s) but agree with the  final edited report.     This report was finalized on 6/11/2025 10:55 AM by Dr. Chandra Christie M.D on Workstation: [id number]     MRI Hip Left Without Contrast  Narrative: MRI HIP LEFT WO CONTRAST-     6/11/2025 4:00 AM     INDICATION: acute left hip pain, suspect effusion on CT, possible " septic  arthritis.     COMPARISON: CT abdomen pelvis 6/10/2025.     TECHNIQUE: Multiplanar, multisequence MR imaging of the pelvis and hip  was performed without the intravenous administration of contrast.     FINDINGS:  SOFT TISSUES: Mild patchy soft tissue edema anterior and lateral to the  proximal left femur and in the left adductor compartment. No cystic or  solid soft tissue mass. No abnormal bursal fluid collection. No  pathologically enlarged lymph nodes. No well-formed or drainable fluid  collection to suggest abscess. The sciatic nerves are unremarkable as  imaged.     BONES: Tiny likely degenerative subchondral cystic changes at the  anterior/superior left acetabulum. No acute fracture. No concerning bone  marrow lesion or marrow replacing process.     JOINTS: Moderate to large left hip joint effusion. Mild left hip joint  chondromalacia with full-thickness or near full-thickness chondral  fissuring at the anterior/superior left acetabulum. The articular  cartilage in the contralateral (right) hip joint space is fairly well  maintained on the wide field-of-view sequences. Full-thickness or near  full-thickness tear of the left ligamentum teres. Mild DJD at the pubic  symphysis. Mild bilateral SI joint DJD. Partially imaged lumbosacral  spondylosis.     LABRUM: Likely degenerative tear of the anterior/superior left  acetabular labrum. Tiny 0.5 cm associated anterior/superior left  paralabral cyst.     MUSCLES AND TENDONS: Mild edema throughout the left adductor muscles and  in the left tensor fascia ana muscle. The anterior muscles and tendons  are otherwise intact. The gluteal tendons are intact. Significant  muscular fatty atrophy. The proximal hamstring tendons are intact.     Impression:    1. Nonspecific moderate to large left hip joint effusion with mild soft  tissue edema along the anterior and lateral proximal left femur.  Correlate with ESR and CRP and possible joint fluid analysis if there  is  clinical concern for septic arthritis.  2. Full-thickness or near full-thickness tear of the left ligamentum  teres.  3. Mild edema in the left abductor muscles in the left abductor  compartment, which could represent mild muscle strain or nonspecific  myositis.        This report was finalized on 6/11/2025 7:31 AM by Oleksandr Wyatt MD on  Workstation: [id number]     CT Abdomen Pelvis With Contrast  Addendum: Patient does have a left hip effusion. No aggressive osseous   abnormalities are seen.       This report was finalized on 6/11/2025 12:58 AM by Dr. Evelyn Carlos M.D on Workstation: [id number]  Narrative: CT OF THE ABDOMEN PELVIS WITH CONTRAST     HISTORY: Left flank pain and fever     COMPARISON: None available.     TECHNIQUE: Axial CT imaging was obtained through the abdomen and pelvis.  IV contrast was administered.     FINDINGS:  The patient is status post closure of a patent foramen ovale. Images  through the lung bases are degraded by motion artifact. There is a  potential nodule within the left lower lobe, measuring 5 mm. No  suspicious hepatic lesions are seen. The stomach, adrenal glands,  spleen, pancreas, and gallbladder all appear normal there is a duodenal  diverticulum.. The kidneys enhance symmetrically. There is no  hydronephrosis. There are bilateral parapelvic cysts. Uterus appears to  normal. No adnexal masses are seen. No distal ureteral or bladder stones  are seen. There is colonic diverticulosis. There is no bowel  obstruction. The appendix is normal. No acute osseous abnormalities are  seen.     Impression: 1. No acute intra-abdominal or intrapelvic process seen.     2. Indeterminate solid pulmonary nodule measuring 5 mm. In a low-risk  patient with a solid nodule <6 mm, recommend no follow-up. In a  high-risk patient, CT at 12 months is optional with stronger  consideration if there is suspicious nodule morphology and/or upper lobe  location.     Radiation dose reduction  techniques were utilized, including automated  exposure control and exposure modulation based on body size.        This report was finalized on 6/11/2025 12:12 AM by Dr. Evelyn Carlos M.D on Workstation: [id number]       Scheduled Medications  aspirin, 81 mg, Oral, Q12H  cefTRIAXone, 2,000 mg, Intravenous, Q24H  [Held by provider] docusate sodium, 100 mg, Oral, BID  [Held by provider] meloxicam, 15 mg, Oral, Daily  pantoprazole, 40 mg, Intravenous, BID AC  [Held by provider] polyethylene glycol, 17 g, Oral, BID  sodium chloride, 10 mL, Intravenous, Q12H  sodium chloride, 10 mL, Intravenous, Q12H  vancomycin, 1,000 mg, Intravenous, Q12H    Infusions  Pharmacy to dose vancomycin,     Diet  Diet: Regular/House; Fluid Consistency: Thin (IDDSI 0)    I have personally reviewed     [x]  Laboratory   [x]  Microbiology   [x]  Radiology   [x]  EKG/Telemetry  [x]  Cardiology/Vascular   []  Pathology    []  Records      Assessment/Plan     Active Hospital Problems    Diagnosis  POA    **Acute hip pain, left [M25.552]  Yes    Septic hip [M00.9]  Unknown    S/P patent foramen ovale closure [Z87.74]  Not Applicable      Resolved Hospital Problems   No resolved problems to display.       54-year-old female with history of prior CVA with PFO s/p closure on aspirin, presented to the ED complaining of hospital because of worsening left hip pain.    Left hip septic arthritis  -Status post irrigation and debridement 06/11/2025 per orthopedic surgery.  - Cultures negative to date.  -On vancomycin and ceftriaxone,   - ID evaluated  4 weeks of antibiotics recommended to stop date of 07/11/2025 at which time she will follow-up with Dr. Ellsworth in the infectious diseases clinic.   - PICC line ordered        Anemia  - Hemoglobin close 11.2 on admission, gradually trended down to as low as 8.1  - No signs of active bleeding  -Likely partly related secondary to blood loss anemia due to surgery however would not expect to see significant  drop.  - IV PPI empirically,   -hold meloxicam.    - Hemoccult stool positive  - Hemoglobin stable  - GI consulted.  Recommended outpatient EGD colonoscopy in 2 to 4 weeks    History of CVA status with PFO s/p PFO closure  -Completed Plavix, takes aspirin 81 mg daily.  - Follows with cardiology outpatient  - Continue low-dose aspirin      Right upper extremity swelling  - Developed swelling following PICC line placement.  - Stat duplex ultrasound ordered    SCDs for DVT prophylaxis.  Full code.  Discussed with patient.  Disposition: Home with home health, likely on Monday once home health for antibiotic infusion set up and pending right upper extremity duplex ultrasound.  Expected Discharge Date: 6/16/2025; Expected Discharge Time:        Copied text in this note has been reviewed and is accurate as of 06/15/25.         Dictated utilizing Dragon dictation        Dustin Cerna MD  [facility]  06/15/25  12:55 EDT        Electronically signed by Dustin Cerna MD at 06/15/25 1255       Farooq Chang MD at 06/15/25 1002           LOS: 4 days     Chief Complaint: Follow-up septic arthritis of the left hip-culture-negative    Interval History: No acute events.  No fever.  PICC line was placed yesterday without difficulty.  Her hip is stable.  She is tolerating Vanco and ceftriaxone without diarrhea or rash.      Medications:    Current Facility-Administered Medications:     acetaminophen (TYLENOL) tablet 650 mg, 650 mg, Oral, Q4H PRN **OR** acetaminophen (TYLENOL) 160 MG/5ML oral solution 650 mg, 650 mg, Oral, Q4H PRN **OR** acetaminophen (TYLENOL) suppository 650 mg, 650 mg, Rectal, Q4H PRN, Ammon Aly MD    acetaminophen (TYLENOL) tablet 325 mg, 325 mg, Oral, Q4H PRN, Ammon Aly MD    aspirin EC tablet 81 mg, 81 mg, Oral, Q12H, Dustin Cerna MD, 81 mg at 06/15/25 0821    sennosides-docusate (PERICOLACE) 8.6-50 MG per tablet 2 tablet, 2 tablet, Oral,  BID PRN, 2 tablet at 06/14/25 1012 **AND** polyethylene glycol (MIRALAX) packet 17 g, 17 g, Oral, Daily PRN, 17 g at 06/14/25 1011 **AND** bisacodyl (DULCOLAX) EC tablet 5 mg, 5 mg, Oral, Daily PRN **AND** bisacodyl (DULCOLAX) suppository 10 mg, 10 mg, Rectal, Daily PRN, Ammon Aly MD    bisacodyl (DULCOLAX) suppository 10 mg, 10 mg, Rectal, Daily PRN, Ammon Aly MD    calcium carbonate (TUMS) chewable tablet 500 mg (200 mg elemental), 2 tablet, Oral, BID PRN, Ammon Aly MD    cefTRIAXone (ROCEPHIN) 2,000 mg in sodium chloride 0.9 % 100 mL MBP, 2,000 mg, Intravenous, Q24H, Farooq Chang MD, Stopped at 06/14/25 1145    [Held by provider] docusate sodium (COLACE) capsule 100 mg, 100 mg, Oral, BID, Ammon Aly MD, 100 mg at 06/13/25 0852    HYDROcodone-acetaminophen (NORCO) 5-325 MG per tablet 1 tablet, 1 tablet, Oral, Q6H PRN, Ammon Aly MD, 1 tablet at 06/15/25 0426    magnesium hydroxide (MILK OF MAGNESIA) suspension 10 mL, 10 mL, Oral, Daily PRN, Ammon Aly MD    [Held by provider] meloxicam (MOBIC) tablet 15 mg, 15 mg, Oral, Daily, Ammon Aly MD, 15 mg at 06/13/25 0852    morphine injection 2 mg, 2 mg, Intravenous, Q3H PRN, Ammon Aly MD, 2 mg at 06/11/25 0807    ondansetron ODT (ZOFRAN-ODT) disintegrating tablet 4 mg, 4 mg, Oral, Q6H PRN **OR** ondansetron (ZOFRAN) injection 4 mg, 4 mg, Intravenous, Q6H PRN, Ammon Aly MD    ondansetron ODT (ZOFRAN-ODT) disintegrating tablet 4 mg, 4 mg, Oral, Q6H PRN **OR** ondansetron (ZOFRAN) injection 4 mg, 4 mg, Intravenous, Q6H PRN, Ammon Aly MD    pantoprazole (PROTONIX) injection 40 mg, 40 mg, Intravenous, BID AC, Dustin Cerna MD, 40 mg at 06/15/25 0806    Pharmacy to dose vancomycin, , Not Applicable, Continuous PRN, Farooq Chang MD    [Held by provider] polyethylene glycol (MIRALAX) packet 17 g, 17 g, Oral, BID, Ammon Aly MD, 17 g at 06/12/25 1018    sodium  chloride 0.9 % flush 10 mL, 10 mL, Intravenous, Q12H, Ammon Aly MD, 10 mL at 06/15/25 0822    sodium chloride 0.9 % flush 10 mL, 10 mL, Intravenous, PRN, Ammon Aly MD    sodium chloride 0.9 % flush 10 mL, 10 mL, Intravenous, Q12H, Farooq Chang MD, 10 mL at 06/15/25 0821    sodium chloride 0.9 % flush 10 mL, 10 mL, Intravenous, PRN, Farooq Chang MD    sodium chloride 0.9 % flush 20 mL, 20 mL, Intravenous, PRN, Farooq Chang MD    sodium chloride 0.9 % infusion 40 mL, 40 mL, Intravenous, PRN, Ammon Aly MD    sodium chloride 0.9 % infusion 40 mL, 40 mL, Intravenous, PRN, Farooq Chang MD    vancomycin (VANCOCIN) 1,000 mg in sodium chloride 0.9 % 250 mL IVPB-VTB, 1,000 mg, Intravenous, Q12H, Dustin Cerna MD, Last Rate: 250 mL/hr at 06/15/25 0551, 1,000 mg at 06/15/25 0551      Vital Signs  Temp:  [98.2 °F (36.8 °C)-98.4 °F (36.9 °C)] 98.2 °F (36.8 °C)  Heart Rate:  [72-95] 72  Resp:  [16] 16  BP: (105-130)/(50-67) 127/67    Physical Exam:  General: NAD, very nice, sitting up in chair  ENT: No scleral icterus  Respiratory: Normal work of breathing on RA  Skin: No rashes or lesions in exposed areas  MSK: Left hip bandaged  Access: RUE PICC without erythema.         Results Review:    CBC, BMP, CRP, Vanco level, and operative cultures reviewed today    Lab Results   Component Value Date    WBC 8.32 06/15/2025    HGB 8.6 (L) 06/15/2025    HCT 25.9 (L) 06/15/2025    MCV 86.9 06/15/2025     06/15/2025     Lab Results   Component Value Date    GLUCOSE 95 06/15/2025    BUN 12.0 06/15/2025    CREATININE 0.67 06/15/2025    EGFRIFAFRI >60 12/15/2020    BCR 17.9 06/15/2025    CO2 25.0 06/15/2025    CALCIUM 8.2 (L) 06/15/2025    ALBUMIN 3.1 (L) 06/15/2025    LABIL2 1.3 12/15/2020    AST 24 06/15/2025    ALT 36 (H) 06/15/2025     Lab Results   Component Value Date    CRP 5.78 (H) 06/11/2025       Lab Results   Component Value Date     [facility] 06/14[facility]2025        Microbiology:  Microbiology Results (last 10 days)       Procedure Component Value - Date/Time    Anaerobic Culture - Tissue, Hip, Left [[id number]]  (Normal) Collected: 06/11/25 1550    Lab Status: Preliminary result Specimen: Tissue from Hip, Left Updated: 06/14/25 0759     Anaerobic Culture No anaerobes isolated at 3 days    Tissue / Bone Culture - Tissue, Hip, Left [[id number]] Collected: 06/11/25 1550    Lab Status: Final result Specimen: Tissue from Hip, Left Updated: 06/14/25 0647     Tissue Culture No growth at 3 days     Gram Stain Moderate (3+) WBCs seen      No organisms seen    Anaerobic Culture - Surgical Site, Hip, Left [[id number]]  (Normal) Collected: 06/11/25 1549    Lab Status: Preliminary result Specimen: Surgical Site from Hip, Left Updated: 06/14/25 0759     Anaerobic Culture No anaerobes isolated at 3 days    Wound Culture - Surgical Site, Hip, Left [[id number]] Collected: 06/11/25 1549    Lab Status: Final result Specimen: Surgical Site from Hip, Left Updated: 06/14/25 0648     Wound Culture No growth at 3 days     Gram Stain Few (2+) WBCs seen      No organisms seen    Anaerobic Culture - Surgical Site, Hip, Left [[id number]]  (Normal) Collected: 06/11/25 1547    Lab Status: Preliminary result Specimen: Surgical Site from Hip, Left Updated: 06/14/25 0759     Anaerobic Culture No anaerobes isolated at 3 days    Wound Culture - Surgical Site, Hip, Left [[id number]] Collected: 06/11/25 1547    Lab Status: Final result Specimen: Surgical Site from Hip, Left Updated: 06/14/25 0648     Wound Culture No growth at 3 days     Gram Stain Moderate (3+) WBCs seen      No organisms seen    Body Fluid Culture - Aspirate, Hip, Left [[id number]] Collected: 06/11/25 0856    Lab Status: Preliminary result Specimen: Aspirate from Hip, Left Updated: 06/15/25 0722     Body Fluid Culture No growth at 4 days     Gram Stain Rare (1+) WBCs seen      No organisms seen    Anaerobic Culture  - Swab, Hip, Left [[id number]]  (Normal) Collected: 25 0856    Lab Status: Preliminary result Specimen: Swab from Hip, Left Updated: 25 0759     Anaerobic Culture No anaerobes isolated at 3 days    Blood Culture - Blood, Arm, Right [[id number]]  (Normal) Collected: 06/10/25 235    Lab Status: Preliminary result Specimen: Blood from Arm, Right Updated: 06/15/25 0016     Blood Culture No growth at 4 days    Blood Culture - Blood, Arm, Left [[id number]]  (Normal) Collected: 06/10/25 2353    Lab Status: Preliminary result Specimen: Blood from Arm, Left Updated: 06/15/25 0016     Blood Culture No growth at 4 days           Prior radiology:  MRI left hip shows a moderate to large left hip effusion and a near full-thickness tear of the left ligamentum teres per my review of the radiology report    Isolation:   No active isolations    Assessment      #Left hip native septic arthritis  #Acute left hip pain  #Obesity BMI 38-complicating above    On 2025, she went to the operating room with orthopedic surgery for debridement of the left hip.  Operative aspiration cultures are negative to date.  I recommend that she be treated with vancomycin 1,000 mg IV every 12 hours with goal -600 and ceftriaxone 2 g IV every 24 hours x 4 weeks with stop date of 2025 at which time she will follow-up with Dr. Ellsworth in the infectious diseases clinic.  Single-lumen PICC is in.  She will need weekly CBC with differential, creatinine, and CRP faxed to Dr. Ellsworth at [phone].    Thank you for allowing me to be involved in the care of this patient. Infectious diseases will sign off at this time with antibiotics plan in place, but please call me at [phone] if any further ID questions or new ID concerns.        Electronically signed by Farooq Chang MD at 06/15/25 7442       Dustin Cerna MD at 25 [phone]              Name: Linda Castano ADMIT: 6/10/2025   : 1970  PCP: Justo  ANABELA Robles    MRN: [id number] LOS: 3 days   AGE/SEX: 54 y.o. female  ROOM: [facility]     Subjective   Subjective   Patient seen this morning.  No acute events overnight.  Sitting up in the chair, eating breakfast.  Hip pain stable.  Tolerating antibiotics.  No fevers no chills.  Having BM, no signs of bleeding/dark stool reported    Review of Systems  As above  Objective   Objective   Vital Signs  Temp:  [98.6 °F (37 °C)-99.1 °F (37.3 °C)] 98.6 °F (37 °C)  Heart Rate:  [75-85] 80  Resp:  [18] 18  BP: (131-132)/(59-73) 131/73  SpO2:  [95 %-100 %] 98 %  on   ;   Device (Oxygen Therapy): room air  Body mass index is 38.02 kg/m².  Physical Exam    General: Alert, sitting up in the chair, not in distress,  HEENT: Normocephalic, atraumatic  CV: Regular rate and rhythm, no murmurs rubs or gallops  Lungs: Clear to auscultation bilaterally, no crackles or wheezes  Abdomen: Soft, nontender, nondistended  Extremities: Left hip dressing in place, swollen.  No obvious signs of significant hematoma    Results Review     I reviewed the patient's new clinical results.  Results from last 7 days   Lab Units 06/14/25  1220 06/14/25  0540 06/13/25  1209 06/13/25 0418 06/12/25 0225 06/11/25  0734 06/10/25  2243   WBC 10*3/mm3  --  8.19  --  9.59  --  10.60 10.56   HEMOGLOBIN g/dL 9.8* 8.5* 9.5* 8.5*   < > 10.3* 11.2*   PLATELETS 10*3/mm3  --  283  --  251  --  262 305    < > = values in this interval not displayed.     Results from last 7 days   Lab Units 06/14/25  0540 06/13/25  0418 06/12/25 0225 06/11/25  0734   SODIUM mmol/L 140 144 138 138   POTASSIUM mmol/L 4.3 4.9 4.4 4.1   CHLORIDE mmol/L 106 109* 108* 107   CO2 mmol/L 26.2 25.0 20.8* 24.0   BUN mg/dL 13.0 15.0 13.0 12.0   CREATININE mg/dL 0.54* 0.94 0.74  0.77 0.72   GLUCOSE mg/dL 96 97 180* 95   Estimated Creatinine Clearance: 127.5 mL/min (A) (by C-G formula based on SCr of 0.54 mg/dL (L)).  Results from last 7 days   Lab Units 06/14/25  0540 06/13/25  0418  "06/10/25  2243   ALBUMIN g/dL 3.0* 2.9* 4.0   BILIRUBIN mg/dL <0.2 <0.2 0.4   ALK PHOS U/L 103 107 133*   AST (SGOT) U/L 20 31 27   ALT (SGPT) U/L 38* 41* 37*     Results from last 7 days   Lab Units 06/14/25  0540 06/13/25  0418 06/12/25  0225 06/11/25  0734 06/10/25  2243   CALCIUM mg/dL 8.2* 8.3* 8.2* 8.5* 9.1   ALBUMIN g/dL 3.0* 2.9*  --   --  4.0   MAGNESIUM mg/dL 2.0  --   --   --   --    PHOSPHORUS mg/dL 4.0  --   --   --   --      Results from last 7 days   Lab Units 06/10/25  2353   LACTATE mmol/L 0.8   No results found for: \"COVID19\"  No results found for: \"HGBA1C\", \"POCGLU\"        FL Guided Aspiration Joint  Narrative: LEFT HIP FLUOROSCOPICALLY GUIDED JOINT ASPIRATION, 6/11/2025     HISTORY:  54-year-old female with left hip pain. MRI demonstrates moderate to  large left hip effusion. Rule out septic joint.     CONSENT:  Procedure, risks and alternatives were discussed in detail with the  patient, including the low risk of allergy and infection; patient  questions were answered, and informed consent was obtained. Timeout was  observed in the procedure room for patient identification and procedure  site verification, and the site was marked by me.     PROCEDURE:  *  Reference Air Kerma: 2.4 mGy     Using sterile technique, 1% lidocaine local anesthetic and fluoroscopic  guidance, a 22-gauge spinal needle was placed into the right hip joint  capsule without difficulty. Aspiration of approximately 12 mL of cloudy  viscous yellow/orange-colored fluid. The needle was removed and manual  pressure was applied achieving adequate hemostasis. A Band-Aid was  applied. Specimen was sent to laboratory as ordered by the requesting  provider. The patient tolerated the procedure well without incident.        Impression: Technically successful left hip joint aspiration with removal of  approximately 12 mL of cloudy viscous yellow/orange-colored fluid.        Procedure performed by ANABELA Blake.  By electronically " signing this report, I, the supervising radiologist,  attest that I was not present for the procedure(s) but agree with the  final edited report.     This report was finalized on 6/11/2025 10:55 AM by Dr. Chandra Christie M.D on Workstation: [id number]     MRI Hip Left Without Contrast  Narrative: MRI HIP LEFT WO CONTRAST-     6/11/2025 4:00 AM     INDICATION: acute left hip pain, suspect effusion on CT, possible septic  arthritis.     COMPARISON: CT abdomen pelvis 6/10/2025.     TECHNIQUE: Multiplanar, multisequence MR imaging of the pelvis and hip  was performed without the intravenous administration of contrast.     FINDINGS:  SOFT TISSUES: Mild patchy soft tissue edema anterior and lateral to the  proximal left femur and in the left adductor compartment. No cystic or  solid soft tissue mass. No abnormal bursal fluid collection. No  pathologically enlarged lymph nodes. No well-formed or drainable fluid  collection to suggest abscess. The sciatic nerves are unremarkable as  imaged.     BONES: Tiny likely degenerative subchondral cystic changes at the  anterior/superior left acetabulum. No acute fracture. No concerning bone  marrow lesion or marrow replacing process.     JOINTS: Moderate to large left hip joint effusion. Mild left hip joint  chondromalacia with full-thickness or near full-thickness chondral  fissuring at the anterior/superior left acetabulum. The articular  cartilage in the contralateral (right) hip joint space is fairly well  maintained on the wide field-of-view sequences. Full-thickness or near  full-thickness tear of the left ligamentum teres. Mild DJD at the pubic  symphysis. Mild bilateral SI joint DJD. Partially imaged lumbosacral  spondylosis.     LABRUM: Likely degenerative tear of the anterior/superior left  acetabular labrum. Tiny 0.5 cm associated anterior/superior left  paralabral cyst.     MUSCLES AND TENDONS: Mild edema throughout the left adductor muscles and  in the left tensor  fascia ana muscle. The anterior muscles and tendons  are otherwise intact. The gluteal tendons are intact. Significant  muscular fatty atrophy. The proximal hamstring tendons are intact.     Impression:    1. Nonspecific moderate to large left hip joint effusion with mild soft  tissue edema along the anterior and lateral proximal left femur.  Correlate with ESR and CRP and possible joint fluid analysis if there is  clinical concern for septic arthritis.  2. Full-thickness or near full-thickness tear of the left ligamentum  teres.  3. Mild edema in the left abductor muscles in the left abductor  compartment, which could represent mild muscle strain or nonspecific  myositis.        This report was finalized on 6/11/2025 7:31 AM by Oleksandr Wyatt MD on  Workstation: [id number]     CT Abdomen Pelvis With Contrast  Addendum: Patient does have a left hip effusion. No aggressive osseous   abnormalities are seen.       This report was finalized on 6/11/2025 12:58 AM by Dr. Evelyn Carlos M.D on Workstation: [id number]  Narrative: CT OF THE ABDOMEN PELVIS WITH CONTRAST     HISTORY: Left flank pain and fever     COMPARISON: None available.     TECHNIQUE: Axial CT imaging was obtained through the abdomen and pelvis.  IV contrast was administered.     FINDINGS:  The patient is status post closure of a patent foramen ovale. Images  through the lung bases are degraded by motion artifact. There is a  potential nodule within the left lower lobe, measuring 5 mm. No  suspicious hepatic lesions are seen. The stomach, adrenal glands,  spleen, pancreas, and gallbladder all appear normal there is a duodenal  diverticulum.. The kidneys enhance symmetrically. There is no  hydronephrosis. There are bilateral parapelvic cysts. Uterus appears to  normal. No adnexal masses are seen. No distal ureteral or bladder stones  are seen. There is colonic diverticulosis. There is no bowel  obstruction. The appendix is normal. No acute osseous  abnormalities are  seen.     Impression: 1. No acute intra-abdominal or intrapelvic process seen.     2. Indeterminate solid pulmonary nodule measuring 5 mm. In a low-risk  patient with a solid nodule <6 mm, recommend no follow-up. In a  high-risk patient, CT at 12 months is optional with stronger  consideration if there is suspicious nodule morphology and/or upper lobe  location.     Radiation dose reduction techniques were utilized, including automated  exposure control and exposure modulation based on body size.        This report was finalized on 6/11/2025 12:12 AM by Dr. Evelyn Carlos M.D on Workstation: [id number]       Scheduled Medications  aspirin, 81 mg, Oral, Q12H  cefTRIAXone, 2,000 mg, Intravenous, Q24H  [Held by provider] docusate sodium, 100 mg, Oral, BID  [Held by provider] meloxicam, 15 mg, Oral, Daily  pantoprazole, 40 mg, Intravenous, BID AC  [Held by provider] polyethylene glycol, 17 g, Oral, BID  sodium chloride, 10 mL, Intravenous, Q12H  sodium chloride, 10 mL, Intravenous, Q12H  vancomycin, 1,000 mg, Intravenous, Q12H    Infusions  Pharmacy to dose vancomycin,     Diet  Diet: Regular/House; Texture: Regular (IDDSI 7); Fluid Consistency: Thin (IDDSI 0)    I have personally reviewed     [x]  Laboratory   [x]  Microbiology   [x]  Radiology   [x]  EKG/Telemetry  [x]  Cardiology/Vascular   []  Pathology    []  Records      Assessment/Plan     Active Hospital Problems    Diagnosis  POA    **Acute hip pain, left [M25.552]  Yes    Septic hip [M00.9]  Unknown    S/P patent foramen ovale closure [Z87.74]  Not Applicable      Resolved Hospital Problems   No resolved problems to display.       54-year-old female with history of prior CVA with PFO s/p closure on aspirin, presented to the ED complaining of hospital because of worsening left hip pain.    Left hip septic arthritis  -Status post irrigation and debridement 06/11/2025 per orthopedic surgery.  - Cultures negative to date.  -On vancomycin  and ceftriaxone,   - ID evaluated  4 weeks of antibiotics recommended to stop date of 07/11/2025 at which time she will follow-up with Dr. Ellsworth in the infectious diseases clinic.   - PICC line ordered        Anemia  - Hemoglobin close 11.2 on admission, gradually trended down to as low as 8.1  - No signs of active bleeding  -Likely partly related secondary to blood loss anemia due to surgery however would not expect to see significant drop.  - Was initiated on aspirin 81 twice daily postsurgery for DVT prophylaxis, hold for now  - V PPI, hold meloxicam.    - Hemoccult stool positive  - Hemoglobin stable  - Consult GI    History of CVA status with PFO s/p PFO closure  -Completed Plavix, takes aspirin 81 mg daily.  - Follows with cardiology outpatient  - Continue low-dose aspirin    SCDs for DVT prophylaxis.  Full code.  Discussed with patient.  Disposition: Home with home health, likely on Monday once home health for antibiotic infusion set up and pending GI evaluation.  Expected Discharge Date: 6/16/2025; Expected Discharge Time:        Copied text in this note has been reviewed and is accurate as of 06/14/25.         Dictated utilizing Dragon dictation        Dustin Cerna MD  [facility]  06/14/25  15:15 EDT        Electronically signed by Dustin Cerna MD at 06/14/25 1516       Farooq Chang MD at 06/14/25 0927           LOS: 3 days     Chief Complaint: Follow-up septic arthritis of the left hip-culture-negative    Interval History: No acute events.  She reports ongoing dull left hip pain.  She is tolerating Vanco and ceftriaxone without rash or diarrhea.  No fever or chills.    Medications:    Current Facility-Administered Medications:     acetaminophen (TYLENOL) tablet 650 mg, 650 mg, Oral, Q4H PRN **OR** acetaminophen (TYLENOL) 160 MG/5ML oral solution 650 mg, 650 mg, Oral, Q4H PRN **OR** acetaminophen (TYLENOL) suppository 650 mg, 650 mg, Rectal, Q4H PRN,  Ammon Aly MD    acetaminophen (TYLENOL) tablet 325 mg, 325 mg, Oral, Q4H PRN, Ammon Aly MD    [Held by provider] aspirin EC tablet 81 mg, 81 mg, Oral, Q12H, Ammon Aly MD, 81 mg at 06/13/25 0852    sennosides-docusate (PERICOLACE) 8.6-50 MG per tablet 2 tablet, 2 tablet, Oral, BID PRN **AND** polyethylene glycol (MIRALAX) packet 17 g, 17 g, Oral, Daily PRN **AND** bisacodyl (DULCOLAX) EC tablet 5 mg, 5 mg, Oral, Daily PRN **AND** bisacodyl (DULCOLAX) suppository 10 mg, 10 mg, Rectal, Daily PRN, Ammon Aly MD    bisacodyl (DULCOLAX) suppository 10 mg, 10 mg, Rectal, Daily PRN, Ammon Aly MD    calcium carbonate (TUMS) chewable tablet 500 mg (200 mg elemental), 2 tablet, Oral, BID PRN, Ammon Aly MD    cefTRIAXone (ROCEPHIN) 2,000 mg in sodium chloride 0.9 % 100 mL MBP, 2,000 mg, Intravenous, Q24H, Farooq Chang MD    [Held by provider] docusate sodium (COLACE) capsule 100 mg, 100 mg, Oral, BID, Ammon Aly MD, 100 mg at 06/13/25 0852    HYDROcodone-acetaminophen (NORCO) 5-325 MG per tablet 1 tablet, 1 tablet, Oral, Q6H PRN, Ammon Aly MD, 1 tablet at 06/14/25 0859    ketorolac (TORADOL) injection 15 mg, 15 mg, Intravenous, Q6H PRN, Ammon Aly MD    magnesium hydroxide (MILK OF MAGNESIA) suspension 10 mL, 10 mL, Oral, Daily PRN, Ammon Aly MD    [Held by provider] meloxicam (MOBIC) tablet 15 mg, 15 mg, Oral, Daily, Ammon Aly MD, 15 mg at 06/13/25 0852    morphine injection 2 mg, 2 mg, Intravenous, Q3H PRN, Ammon Aly MD, 2 mg at 06/11/25 0807    ondansetron ODT (ZOFRAN-ODT) disintegrating tablet 4 mg, 4 mg, Oral, Q6H PRN **OR** ondansetron (ZOFRAN) injection 4 mg, 4 mg, Intravenous, Q6H PRN, Ammon Aly MD    ondansetron ODT (ZOFRAN-ODT) disintegrating tablet 4 mg, 4 mg, Oral, Q6H PRN **OR** ondansetron (ZOFRAN) injection 4 mg, 4 mg, Intravenous, Q6H PRN, Ammon Aly MD    pantoprazole (PROTONIX) injection  40 mg, 40 mg, Intravenous, BID AC, Dustin Cerna MD, 40 mg at 06/14/25 0554    Pharmacy to dose vancomycin, , Not Applicable, Continuous PRN, Farooq Chang MD    [Held by provider] polyethylene glycol (MIRALAX) packet 17 g, 17 g, Oral, BID, Ammon Aly MD, 17 g at 06/12/25 1018    sodium chloride 0.9 % flush 10 mL, 10 mL, Intravenous, Q12H, Ammon Aly MD, 10 mL at 06/13/25 2225    sodium chloride 0.9 % flush 10 mL, 10 mL, Intravenous, PRN, Ammon Aly MD    sodium chloride 0.9 % infusion 40 mL, 40 mL, Intravenous, PRN, Ammon Aly MD    Vancomycin HCl 1,250 mg in sodium chloride 0.9 % 250 mL VTB, 1,250 mg, Intravenous, Q12H, Aravind Ellsworth, DO, Last Rate: 200 mL/hr at 06/14/25 0040, 1,250 mg at 06/14/25 0040      Vital Signs  Temp:  [98.6 °F (37 °C)-99.1 °F (37.3 °C)] 98.6 °F (37 °C)  Heart Rate:  [74-85] 80  Resp:  [18] 18  BP: (131-140)/(59-73) 131/73    Physical Exam:  General: NAD, very nice, sitting up in chair  ENT: No scleral icterus  Respiratory: Normal work of breathing on room air without wheezing  Skin: No rashes or lesions in exposed areas  MSK: Left hip bandaged  Access: Peripheral IV in RUE without erythema.         Results Review:    CBC, BMP, CRP, Vanco level, and operative cultures reviewed today    Lab Results   Component Value Date    WBC 8.19 06/14/2025    HGB 8.5 (L) 06/14/2025    HCT 26.2 (L) 06/14/2025    MCV 87.0 06/14/2025     06/14/2025     Lab Results   Component Value Date    GLUCOSE 96 06/14/2025    BUN 13.0 06/14/2025    CREATININE 0.54 (L) 06/14/2025    EGFRIFAFRI >60 12/15/2020    BCR 24.1 06/14/2025    CO2 26.2 06/14/2025    CALCIUM 8.2 (L) 06/14/2025    ALBUMIN 3.0 (L) 06/14/2025    LABIL2 1.3 12/15/2020    AST 20 06/14/2025    ALT 38 (H) 06/14/2025     Lab Results   Component Value Date    CRP 5.78 (H) 06/11/2025       Lab Results   Component Value Date    [facility] 06/12[facility]20[facility]        Microbiology:  Microbiology  Results (last 10 days)       Procedure Component Value - Date/Time    Anaerobic Culture - Tissue, Hip, Left [[id number]]  (Normal) Collected: 06/11/25 1550    Lab Status: Preliminary result Specimen: Tissue from Hip, Left Updated: 06/14/25 0759     Anaerobic Culture No anaerobes isolated at 3 days    Tissue / Bone Culture - Tissue, Hip, Left [[id number]] Collected: 06/11/25 1550    Lab Status: Final result Specimen: Tissue from Hip, Left Updated: 06/14/25 0647     Tissue Culture No growth at 3 days     Gram Stain Moderate (3+) WBCs seen      No organisms seen    Anaerobic Culture - Surgical Site, Hip, Left [[id number]]  (Normal) Collected: 06/11/25 1549    Lab Status: Preliminary result Specimen: Surgical Site from Hip, Left Updated: 06/14/25 0759     Anaerobic Culture No anaerobes isolated at 3 days    Wound Culture - Surgical Site, Hip, Left [[id number]] Collected: 06/11/25 1549    Lab Status: Final result Specimen: Surgical Site from Hip, Left Updated: 06/14/25 0648     Wound Culture No growth at 3 days     Gram Stain Few (2+) WBCs seen      No organisms seen    Anaerobic Culture - Surgical Site, Hip, Left [[id number]]  (Normal) Collected: 06/11/25 1547    Lab Status: Preliminary result Specimen: Surgical Site from Hip, Left Updated: 06/14/25 0759     Anaerobic Culture No anaerobes isolated at 3 days    Wound Culture - Surgical Site, Hip, Left [[id number]] Collected: 06/11/25 1547    Lab Status: Final result Specimen: Surgical Site from Hip, Left Updated: 06/14/25 0648     Wound Culture No growth at 3 days     Gram Stain Moderate (3+) WBCs seen      No organisms seen    Body Fluid Culture - Aspirate, Hip, Left [[id number]] Collected: 06/11/25 0856    Lab Status: Preliminary result Specimen: Aspirate from Hip, Left Updated: 06/14/25 0648     Body Fluid Culture No growth at 3 days     Gram Stain Rare (1+) WBCs seen      No organisms seen    Anaerobic Culture - Swab, Hip, Left [[id number]]  (Normal) Collected: 06/11/25 0856     Lab Status: Preliminary result Specimen: Swab from Hip, Left Updated: 06/14/25 0759     Anaerobic Culture No anaerobes isolated at 3 days    Blood Culture - Blood, Arm, Right [[id number]]  (Normal) Collected: 06/10/25 2359    Lab Status: Preliminary result Specimen: Blood from Arm, Right Updated: 06/14/25 0015     Blood Culture No growth at 3 days    Blood Culture - Blood, Arm, Left [[id number]]  (Normal) Collected: 06/10/25 2353    Lab Status: Preliminary result Specimen: Blood from Arm, Left Updated: 06/14/25 0015     Blood Culture No growth at 3 days           New radiology:  MRI left hip shows a moderate to large left hip effusion and a near full-thickness tear of the left ligamentum teres per my review of the radiology report    Isolation:   No active isolations    Assessment      #Left hip native septic arthritis  #Acute left hip pain  #Obesity BMI 38-complicating above    On 6/11/2025, she went to the operating room with orthopedic surgery for debridement of the left hip.  Operative aspiration cultures are negative to date.  I recommend that she be treated with vancomycin 1250 mg IV every 12 hours with goal -600 and ceftriaxone 2 g IV every 24 hours x 4 weeks with stop date of 7/11/2025 at which time she will follow-up with Dr. Ellsworth in the infectious diseases clinic.  I will go ahead and order a single-lumen PICC line today.  She will need weekly CBC with differential, creatinine, and CRP faxed to Dr. Ellsworth at [phone].    Antibiotic recommendations discussed with her primary attending.    No objection to discharge from an infectious diseases standpoint once her PICC line is in and home antibiotics are arranged.      Electronically signed by Farooq Chang MD at 06/14/25 5188       Ammon Aly MD at 06/13/25 8518          Orthopedic Progress Note      Patient: Linda Castano  Date of Admission: 6/10/2025  YOB: 1970  Medical Record Number: [id number]    POD #  :  2 Days Post-Op Procedure(s) (LRB):  HIP INCISION AND DRAINAGE WITH HANA TABLE (Left)    Patient seen exam this morning overall resting well ready to take some pain medicine but does report hip is improved.    Physical Exam:  54 y.o.  female  Vitals:  Temp:  [98.6 °F (37 °C)-99.9 °F (37.7 °C)] 99.5 °F (37.5 °C)  Heart Rate:  [77-83] 77  Resp:  [18] 18  BP: (124-150)/(57-75) 124/62  alert and oriented    Ext: NV intact. ROM appropriate. Calf is soft and nontender. Negative Homans Sn.  2+ pedal pulses  Skin: Incision clean dry and intact w/out signs or  symptoms of infection.    Activity: Mobilizing Per P.T.   Weight Bearing: As Tolerated    Data Review     Admission on 06/10/2025   Component Date Value Ref Range Status    Glucose 06/10/2025 107 (H)  65 - 99 mg/dL Final    BUN 06/10/2025 14.0  6.0 - 20.0 mg/dL Final    Creatinine 06/10/2025 0.71  0.57 - 1.00 mg/dL Final    Sodium 06/10/2025 137  136 - 145 mmol/L Final    Potassium 06/10/2025 4.7  3.5 - 5.2 mmol/L Final    Chloride 06/10/2025 104  98 - 107 mmol/L Final    CO2 06/10/2025 22.0  22.0 - 29.0 mmol/L Final    Calcium 06/10/2025 9.1  8.6 - 10.5 mg/dL Final    Total Protein 06/10/2025 7.2  6.0 - 8.5 g/dL Final    Albumin 06/10/2025 4.0  3.5 - 5.2 g/dL Final    ALT (SGPT) 06/10/2025 37 (H)  1 - 33 U/L Final    AST (SGOT) 06/10/2025 27  1 - 32 U/L Final    Alkaline Phosphatase 06/10/2025 133 (H)  39 - 117 U/L Final    Total Bilirubin 06/10/2025 0.4  0.0 - 1.2 mg/dL Final    Globulin 06/10/2025 3.2  gm/dL Final    A/G Ratio 06/10/2025 1.3  g/dL Final    BUN/Creatinine Ratio 06/10/2025 19.7  7.0 - 25.0 Final    Anion Gap 06/10/2025 11.0  5.0 - 15.0 mmol/L Final    eGFR 06/10/2025 101.2  >60.0 mL/min/1.73 Final    Color, UA 06/10/2025 Yellow  Yellow, Straw Final    Appearance, UA 06/10/2025 Clear  Clear Final    pH, UA 06/10/2025 5.5  5.0 - 8.0 Final    Specific Gravity, UA 06/10/2025 1.019  1.005 - 1.030 Final    Glucose, UA 06/10/2025 Negative  Negative Final     Ketones, UA 06/10/2025 15 mg/dL (1+) (A)  Negative Final    Bilirubin, UA 06/10/2025 Negative  Negative Final    Blood, UA 06/10/2025 Negative  Negative Final    Protein, UA 06/10/2025 Negative  Negative Final    Leuk Esterase, UA 06/10/2025 Small (1+) (A)  Negative Final    Nitrite, UA 06/10/2025 Negative  Negative Final    Urobilinogen, UA 06/10/2025 0.2 E.U./dL  0.2 - 1.0 E.U./dL Final    WBC 06/10/2025 10.56  3.40 - 10.80 10*3/mm3 Final    RBC 06/10/2025 3.91  3.77 - 5.28 10*6/mm3 Final    Hemoglobin 06/10/2025 11.2 (L)  12.0 - 15.9 g/dL Final    Hematocrit 06/10/2025 33.7 (L)  34.0 - 46.6 % Final    MCV 06/10/2025 86.2  79.0 - 97.0 fL Final    MCH 06/10/2025 28.6  26.6 - 33.0 pg Final    MCHC 06/10/2025 33.2  31.5 - 35.7 g/dL Final    RDW 06/10/2025 13.2  12.3 - 15.4 % Final    RDW-SD 06/10/2025 41.3  37.0 - 54.0 fl Final    MPV 06/10/2025 9.4  6.0 - 12.0 fL Final    Platelets 06/10/2025 305  140 - 450 10*3/mm3 Final    Neutrophil % 06/10/2025 77.4 (H)  42.7 - 76.0 % Final    Lymphocyte % 06/10/2025 13.4 (L)  19.6 - 45.3 % Final    Monocyte % 06/10/2025 7.9  5.0 - 12.0 % Final    Eosinophil % 06/10/2025 0.5  0.3 - 6.2 % Final    Basophil % 06/10/2025 0.3  0.0 - 1.5 % Final    Immature Grans % 06/10/2025 0.5  0.0 - 0.5 % Final    Neutrophils, Absolute 06/10/2025 8.18 (H)  1.70 - 7.00 10*3/mm3 Final    Lymphocytes, Absolute 06/10/2025 1.42  0.70 - 3.10 10*3/mm3 Final    Monocytes, Absolute 06/10/2025 0.83  0.10 - 0.90 10*3/mm3 Final    Eosinophils, Absolute 06/10/2025 0.05  0.00 - 0.40 10*3/mm3 Final    Basophils, Absolute 06/10/2025 0.03  0.00 - 0.20 10*3/mm3 Final    Immature Grans, Absolute 06/10/2025 0.05  0.00 - 0.05 10*3/mm3 Final    nRBC 06/10/2025 0.0  0.0 - 0.2 /100 WBC Final    Lipase 06/10/2025 20  13 - 60 U/L Final    Sed Rate 06/10/2025 43 (H)  0 - 30 mm/hr Final    C-Reactive Protein 06/10/2025 6.89 (H)  0.00 - 0.50 mg/dL Final    Blood Culture 06/10/2025 No growth at 2 days   Preliminary     Blood Culture 06/10/2025 No growth at 2 days   Preliminary    Lactate 06/10/2025 0.8  0.5 - 2.0 mmol/L Final    RBC, UA 06/10/2025 0-2  None Seen, 0-2 /HPF Final    WBC, UA 06/10/2025 3-5 (A)  None Seen, 0-2 /HPF Final    Urine culture not indicated.    Bacteria, UA 06/10/2025 None Seen  None Seen /HPF Final    Squamous Epithelial Cells, UA 06/10/2025 0-2  None Seen, 0-2 /HPF Final    Hyaline Casts, UA 06/10/2025 None Seen  None Seen /LPF Final    Methodology 06/10/2025 Automated Microscopy   Final    Glucose 06/11/2025 95  65 - 99 mg/dL Final    BUN 06/11/2025 12.0  6.0 - 20.0 mg/dL Final    Creatinine 06/11/2025 0.72  0.57 - 1.00 mg/dL Final    Sodium 06/11/2025 138  136 - 145 mmol/L Final    Potassium 06/11/2025 4.1  3.5 - 5.2 mmol/L Final    Chloride 06/11/2025 107  98 - 107 mmol/L Final    CO2 06/11/2025 24.0  22.0 - 29.0 mmol/L Final    Calcium 06/11/2025 8.5 (L)  8.6 - 10.5 mg/dL Final    BUN/Creatinine Ratio 06/11/2025 16.7  7.0 - 25.0 Final    Anion Gap 06/11/2025 7.0  5.0 - 15.0 mmol/L Final    eGFR 06/11/2025 99.5  >60.0 mL/min/1.73 Final    WBC 06/11/2025 10.60  3.40 - 10.80 10*3/mm3 Final    RBC 06/11/2025 3.55 (L)  3.77 - 5.28 10*6/mm3 Final    Hemoglobin 06/11/2025 10.3 (L)  12.0 - 15.9 g/dL Final    Hematocrit 06/11/2025 32.0 (L)  34.0 - 46.6 % Final    MCV 06/11/2025 90.1  79.0 - 97.0 fL Final    MCH 06/11/2025 29.0  26.6 - 33.0 pg Final    MCHC 06/11/2025 32.2  31.5 - 35.7 g/dL Final    RDW 06/11/2025 13.6  12.3 - 15.4 % Final    RDW-SD 06/11/2025 44.6  37.0 - 54.0 fl Final    MPV 06/11/2025 9.5  6.0 - 12.0 fL Final    Platelets 06/11/2025 262  140 - 450 10*3/mm3 Final    Body Fluid Culture 06/11/2025 No growth at 2 days   Preliminary    Gram Stain 06/11/2025 Rare (1+) WBCs seen   Preliminary    Gram Stain 06/11/2025 No organisms seen   Preliminary    Crystals, Fluid 06/11/2025 No crystals seen   Final    Color, Fluid 06/11/2025 Red   Final    Appearance, Fluid 06/11/2025 Cloudy (A)  Clear Final     RBC, Fluid 06/11/2025 40,000  /mm3 Final    Nucleated Cells, Fluid 06/11/2025 58,720  /mm3 Final    Method: 06/11/2025 Automated Sysmex XN Method   Final    Neutrophils, Fluid % 06/11/2025 89  % Final    Lymphocytes, Fluid % 06/11/2025 8  % Final    Monocytes, Fluid % 06/11/2025 3  % Final    C-Reactive Protein 06/11/2025 5.78 (H)  0.00 - 0.50 mg/dL Final    Wound Culture 06/11/2025 No growth at 2 days   Preliminary    Gram Stain 06/11/2025 Moderate (3+) WBCs seen   Preliminary    Gram Stain 06/11/2025 No organisms seen   Preliminary    Wound Culture 06/11/2025 No growth at 2 days   Preliminary    Gram Stain 06/11/2025 Few (2+) WBCs seen   Preliminary    Gram Stain 06/11/2025 No organisms seen   Preliminary    Tissue Culture 06/11/2025 No growth at 2 days   Preliminary    Gram Stain 06/11/2025 Moderate (3+) WBCs seen   Preliminary    Gram Stain 06/11/2025 No organisms seen   Preliminary    Case Report 06/11/2025    Final                    Value:Surgical Pathology Report                         Case: [id number]                                  Authorizing Provider:  Nik Galvin MD      Collected:           06/11/2025 03:50 PM          Ordering Location:     [facility]  Received:            06/11/2025 08:21 PM                                 [address]                                                                       Pathologist:           Carlos Keene MD                                                         Specimen:    Hip, Left, L hip; recv'd from micro                                                        Final Diagnosis 06/11/2025    Final                    Value:1.  Soft tissue, left hip, excision: Extensive fibrinous debris with extensive acute inflammation and areas of necroinflammatory debris and fragments of fibrous tissue    Comment: A minute foci of degenerated bone was identified with surrounding acute inflammation      Gross Description 06/11/2025    Final                   "  Value:1. Hip, Left.  Received fresh and subsequently placed in formalin, labeled \"left hip\" is a 1.5 x 0.6 x 0.3 cm tan-white irregular rubbery tissue, which is submitted in toto as 1A.    jap/o/josé miguel        Microscopic Description 06/11/2025    Final                    Value:Unless \"gross only\" is specified, the final diagnosis for each specimen is based on microscopic examination of tissue.      Creatinine 06/12/2025 0.77  0.57 - 1.00 mg/dL Final    eGFR 06/12/2025 91.8  >60.0 mL/min/1.73 Final    Hemoglobin 06/12/2025 9.6 (L)  12.0 - 15.9 g/dL Final    Hematocrit 06/12/2025 29.6 (L)  34.0 - 46.6 % Final    Vancomycin Trough 06/12/2025 11.20  5.00 - 20.00 mcg/mL Final    Glucose 06/12/2025 180 (H)  65 - 99 mg/dL Final    BUN 06/12/2025 13.0  6.0 - 20.0 mg/dL Final    Creatinine 06/12/2025 0.74  0.57 - 1.00 mg/dL Final    Sodium 06/12/2025 138  136 - 145 mmol/L Final    Potassium 06/12/2025 4.4  3.5 - 5.2 mmol/L Final    Chloride 06/12/2025 108 (H)  98 - 107 mmol/L Final    CO2 06/12/2025 20.8 (L)  22.0 - 29.0 mmol/L Final    Calcium 06/12/2025 8.2 (L)  8.6 - 10.5 mg/dL Final    BUN/Creatinine Ratio 06/12/2025 17.6  7.0 - 25.0 Final    Anion Gap 06/12/2025 9.2  5.0 - 15.0 mmol/L Final    eGFR 06/12/2025 96.3  >60.0 mL/min/1.73 Final    Glucose 06/13/2025 97  65 - 99 mg/dL Final    BUN 06/13/2025 15.0  6.0 - 20.0 mg/dL Final    Creatinine 06/13/2025 0.94  0.57 - 1.00 mg/dL Final    Sodium 06/13/2025 144  136 - 145 mmol/L Final    Potassium 06/13/2025 4.9  3.5 - 5.2 mmol/L Final    Chloride 06/13/2025 109 (H)  98 - 107 mmol/L Final    CO2 06/13/2025 25.0  22.0 - 29.0 mmol/L Final    Calcium 06/13/2025 8.3 (L)  8.6 - 10.5 mg/dL Final    Total Protein 06/13/2025 5.7 (L)  6.0 - 8.5 g/dL Final    Albumin 06/13/2025 2.9 (L)  3.5 - 5.2 g/dL Final    ALT (SGPT) 06/13/2025 41 (H)  1 - 33 U/L Final    AST (SGOT) 06/13/2025 31  1 - 32 U/L Final    Alkaline Phosphatase 06/13/2025 107  39 - 117 U/L Final    Total Bilirubin " 06/13/2025 <0.2  0.0 - 1.2 mg/dL Final    Globulin 06/13/2025 2.8  gm/dL Final    A/G Ratio 06/13/2025 1.0  g/dL Final    BUN/Creatinine Ratio 06/13/2025 16.0  7.0 - 25.0 Final    Anion Gap 06/13/2025 10.0  5.0 - 15.0 mmol/L Final    eGFR 06/13/2025 72.3  >60.0 mL/min/1.73 Final    WBC 06/13/2025 9.59  3.40 - 10.80 10*3/mm3 Final    RBC 06/13/2025 2.92 (L)  3.77 - 5.28 10*6/mm3 Final    Hemoglobin 06/13/2025 8.5 (L)  12.0 - 15.9 g/dL Final    Hematocrit 06/13/2025 25.0 (L)  34.0 - 46.6 % Final    MCV 06/13/2025 85.6  79.0 - 97.0 fL Final    MCH 06/13/2025 29.1  26.6 - 33.0 pg Final    MCHC 06/13/2025 34.0  31.5 - 35.7 g/dL Final    RDW 06/13/2025 13.1  12.3 - 15.4 % Final    RDW-SD 06/13/2025 40.7  37.0 - 54.0 fl Final    MPV 06/13/2025 9.6  6.0 - 12.0 fL Final    Platelets 06/13/2025 251  140 - 450 10*3/mm3 Final    Neutrophil % 06/13/2025 61.2  42.7 - 76.0 % Final    Lymphocyte % 06/13/2025 28.8  19.6 - 45.3 % Final    Monocyte % 06/13/2025 7.7  5.0 - 12.0 % Final    Eosinophil % 06/13/2025 1.5  0.3 - 6.2 % Final    Basophil % 06/13/2025 0.5  0.0 - 1.5 % Final    Immature Grans % 06/13/2025 0.3  0.0 - 0.5 % Final    Neutrophils, Absolute 06/13/2025 5.87  1.70 - 7.00 10*3/mm3 Final    Lymphocytes, Absolute 06/13/2025 2.76  0.70 - 3.10 10*3/mm3 Final    Monocytes, Absolute 06/13/2025 0.74  0.10 - 0.90 10*3/mm3 Final    Eosinophils, Absolute 06/13/2025 0.14  0.00 - 0.40 10*3/mm3 Final    Basophils, Absolute 06/13/2025 0.05  0.00 - 0.20 10*3/mm3 Final    Immature Grans, Absolute 06/13/2025 0.03  0.00 - 0.05 10*3/mm3 Final    nRBC 06/13/2025 0.0  0.0 - 0.2 /100 WBC Final    Iron 06/13/2025 31 (L)  37 - 145 mcg/dL Final    Iron Saturation (TSAT) 06/13/2025 12 (L)  20 - 50 % Final    Transferrin 06/13/2025 170 (L)  200 - 360 mg/dL Final    TIBC 06/13/2025 253 (L)  298 - 536 mcg/dL Final    Ferritin 06/13/2025 172.00 (H)  13.00 - 150.00 ng/mL Final       FL Guided Aspiration Joint  Result Date: 6/11/2025  Narrative: LEFT  HIP FLUOROSCOPICALLY GUIDED JOINT ASPIRATION, 6/11/2025  HISTORY: 54-year-old female with left hip pain. MRI demonstrates moderate to large left hip effusion. Rule out septic joint.  CONSENT: Procedure, risks and alternatives were discussed in detail with the patient, including the low risk of allergy and infection; patient questions were answered, and informed consent was obtained. Timeout was observed in the procedure room for patient identification and procedure site verification, and the site was marked by me.  PROCEDURE: *  Reference Air Kerma: 2.4 mGy  Using sterile technique, 1% lidocaine local anesthetic and fluoroscopic guidance, a 22-gauge spinal needle was placed into the right hip joint capsule without difficulty. Aspiration of approximately 12 mL of cloudy viscous yellow/orange-colored fluid. The needle was removed and manual pressure was applied achieving adequate hemostasis. A Band-Aid was applied. Specimen was sent to laboratory as ordered by the requesting provider. The patient tolerated the procedure well without incident.       Impression: Technically successful left hip joint aspiration with removal of approximately 12 mL of cloudy viscous yellow/orange-colored fluid.   Procedure performed by ANABELA Blake. By electronically signing this report, I, the supervising radiologist, attest that I was not present for the procedure(s) but agree with the final edited report.  This report was finalized on 6/11/2025 10:55 AM by Dr. Chandra Christie M.D on Workstation: [id number]      MRI Hip Left Without Contrast  Result Date: 6/11/2025  Narrative: MRI HIP LEFT WO CONTRAST-  6/11/2025 4:00 AM  INDICATION: acute left hip pain, suspect effusion on CT, possible septic arthritis.  COMPARISON: CT abdomen pelvis 6/10/2025.  TECHNIQUE: Multiplanar, multisequence MR imaging of the pelvis and hip was performed without the intravenous administration of contrast.  FINDINGS: SOFT TISSUES: Mild patchy soft tissue  edema anterior and lateral to the proximal left femur and in the left adductor compartment. No cystic or solid soft tissue mass. No abnormal bursal fluid collection. No pathologically enlarged lymph nodes. No well-formed or drainable fluid collection to suggest abscess. The sciatic nerves are unremarkable as imaged.  BONES: Tiny likely degenerative subchondral cystic changes at the anterior/superior left acetabulum. No acute fracture. No concerning bone marrow lesion or marrow replacing process.  JOINTS: Moderate to large left hip joint effusion. Mild left hip joint chondromalacia with full-thickness or near full-thickness chondral fissuring at the anterior/superior left acetabulum. The articular cartilage in the contralateral (right) hip joint space is fairly well maintained on the wide field-of-view sequences. Full-thickness or near full-thickness tear of the left ligamentum teres. Mild DJD at the pubic symphysis. Mild bilateral SI joint DJD. Partially imaged lumbosacral spondylosis.  LABRUM: Likely degenerative tear of the anterior/superior left acetabular labrum. Tiny 0.5 cm associated anterior/superior left paralabral cyst.  MUSCLES AND TENDONS: Mild edema throughout the left adductor muscles and in the left tensor fascia ana muscle. The anterior muscles and tendons are otherwise intact. The gluteal tendons are intact. Significant muscular fatty atrophy. The proximal hamstring tendons are intact.      Impression:  1. Nonspecific moderate to large left hip joint effusion with mild soft tissue edema along the anterior and lateral proximal left femur. Correlate with ESR and CRP and possible joint fluid analysis if there is clinical concern for septic arthritis. 2. Full-thickness or near full-thickness tear of the left ligamentum teres. 3. Mild edema in the left abductor muscles in the left abductor compartment, which could represent mild muscle strain or nonspecific myositis.   This report was finalized on  6/11/2025 7:31 AM by Oleksandr Wyatt MD on Workstation: [id number]      CT Abdomen Pelvis With Contrast  Addendum Date: 6/11/2025  Addendum: Patient does have a left hip effusion. No aggressive osseous abnormalities are seen.  This report was finalized on 6/11/2025 12:58 AM by Dr. Evelyn Carlos M.D on Workstation: [id number]      Result Date: 6/11/2025  Narrative: CT OF THE ABDOMEN PELVIS WITH CONTRAST  HISTORY: Left flank pain and fever  COMPARISON: None available.  TECHNIQUE: Axial CT imaging was obtained through the abdomen and pelvis. IV contrast was administered.  FINDINGS: The patient is status post closure of a patent foramen ovale. Images through the lung bases are degraded by motion artifact. There is a potential nodule within the left lower lobe, measuring 5 mm. No suspicious hepatic lesions are seen. The stomach, adrenal glands, spleen, pancreas, and gallbladder all appear normal there is a duodenal diverticulum.. The kidneys enhance symmetrically. There is no hydronephrosis. There are bilateral parapelvic cysts. Uterus appears to normal. No adnexal masses are seen. No distal ureteral or bladder stones are seen. There is colonic diverticulosis. There is no bowel obstruction. The appendix is normal. No acute osseous abnormalities are seen.      Impression: 1. No acute intra-abdominal or intrapelvic process seen.  2. Indeterminate solid pulmonary nodule measuring 5 mm. In a low-risk patient with a solid nodule <6 mm, recommend no follow-up. In a high-risk patient, CT at 12 months is optional with stronger consideration if there is suspicious nodule morphology and/or upper lobe location.  Radiation dose reduction techniques were utilized, including automated exposure control and exposure modulation based on body size.   This report was finalized on 6/11/2025 12:12 AM by Dr. Evelyn Carlos M.D on Workstation: [id number]        Medications:  [Held by provider] aspirin, 81 mg, Oral, Q12H  cefTRIAXone,  2,000 mg, Intravenous, Q24H  [Held by provider] docusate sodium, 100 mg, Oral, BID  [Held by provider] meloxicam, 15 mg, Oral, Daily  pantoprazole, 40 mg, Intravenous, BID AC  [Held by provider] polyethylene glycol, 17 g, Oral, BID  sodium chloride, 10 mL, Intravenous, Q12H  vancomycin, 1,250 mg, Intravenous, Q12H        acetaminophen **OR** acetaminophen **OR** acetaminophen    acetaminophen    senna-docusate sodium **AND** polyethylene glycol **AND** bisacodyl **AND** bisacodyl    bisacodyl    calcium carbonate    HYDROcodone-acetaminophen    ketorolac    magnesium hydroxide    Morphine    ondansetron ODT **OR** ondansetron    ondansetron ODT **OR** ondansetron    Pharmacy to dose vancomycin    sodium chloride    sodium chloride      Minimal drain output plan to remove this morning.    Assessment:  Doing well POD  # 2 Days Post-Op Procedure(s) (LRB):  HIP INCISION AND DRAINAGE WITH HANA TABLE (Left)  Problems Addressed this Visit          Musculoskeletal and Injuries    * (Principal) Acute hip pain, left       Other    Septic hip - Primary    Relevant Medications    cefTRIAXone (ROCEPHIN) 2,000 mg in sodium chloride 0.9 % 100 mL MBP    Other Relevant Orders    Case Request (Completed)    Anaerobic Culture - Surgical Site, Hip, Left    Anaerobic Culture - Surgical Site, Hip, Left    Wound Culture - Surgical Site, Hip, Left (Completed)    Wound Culture - Surgical Site, Hip, Left (Completed)    Anaerobic Culture - Tissue, Hip, Left    Tissue / Bone Culture - Tissue, Hip, Left (Completed)     Other Visit Diagnoses         Fever, unspecified fever cause          Effusion of left hip              Diagnoses         Codes Comments      Septic hip    -  Primary ICD-10-CM: M00.9  ICD-9-CM: 711.05       Acute hip pain, left     ICD-10-CM: M25.552  ICD-9-CM: 719.45       Fever, unspecified fever cause     ICD-10-CM: R50.9  ICD-9-CM: 780.60       Effusion of left hip     ICD-10-CM: M25.452  ICD-9-CM: 719.05              Plan:  Antibiotics per infectious disease.  Will remove drain today discussed with nursing.  Continue efforts to mobilize  Continue Pain Control Measures  Continue incisional Care  DVT prophylaxis    Follow-up with orthopedics in 2 weeks please call with questions or concerns thank you    Ammon Aly MD    Date: 6/13/2025  Time: 11:37 EDT     Electronically signed by Ammon Aly MD at 06/13/25 1138          Consult Notes (last 4 days)        Deven Tristan MD at 06/14/25 2234          Gastroenterology   Initial Inpatient Consult Note  Thank you for asking opinion on this patient  Referring Provider: HEYDI Cerna MD    Reason for Consultation: Anemia, heme positive stool    Subjective     History of present illness:    54 y.o. female that we are asked to see for anemia and heme positive stool.  Patient denies to me any overt bleeding.  She has no melena or hematochezia.  She is currently hospitalized for septic arthritis of the left hip status post left hip aspiration by Ortho.  She is on antibiotics.  No recent endoscopic evaluation.    Past Medical History:  Past Medical History:   Diagnosis Date    Hyperlipidemia 5/22/24    Hypertension     improved with weight loss    Implantable loop recorder present 07/2024    Obesity     PFO (patent foramen ovale)     Stroke      Past Surgical History:  Past Surgical History:   Procedure Laterality Date    CARDIAC ELECTROPHYSIOLOGY PROCEDURE N/A 07/09/2024    Procedure: Loop insertion  medtronic;  Surgeon: Mahin Crooks MD;  Location: [facility] CATH INVASIVE LOCATION;  Service: Cardiovascular;  Laterality: N/A;    INCISION AND DRAINAGE HIP Left 6/11/2025    Procedure: HIP INCISION AND DRAINAGE WITH HANA TABLE;  Surgeon: Ammon Aly MD;  Location: [facility] OR [facility];  Service: Orthopedics;  Laterality: Left;    PATENT FORAMEN OVALE CLOSURE N/A 12/13/2024    Procedure: Patent foramen ovale closure ABBOTT;  Surgeon: Mahin Crooks MD;   Location: [facility] INVASIVE LOCATION;  Service: Cardiology;  Laterality: N/A;      Social History:   Social History     Tobacco Use    Smoking status: Never     Passive exposure: Never    Smokeless tobacco: Never   Substance Use Topics    Alcohol use: Not Currently     Comment: No longer drinking alcohol      Family History:  Family History   Problem Relation Age of Onset    Hypertension Mother     Stroke Father     Hypertension Father     Hyperlipidemia Father     Heart disease Maternal Grandfather        Home Meds:  Medications Prior to Admission   Medication Sig Dispense Refill Last Dose/Taking    Aspirin Low Dose 81 MG EC tablet Take 1 tablet by mouth Daily.   6/6/2025    clopidogrel (PLAVIX) 75 MG tablet Take 1 tablet by mouth Daily. 90 tablet 0 3/11/2025    [employer] TRIPLE MAGNESIUM COMPLEX PO Take 300 mg by mouth Every Night.   6/10/2025    multivitamin with minerals tablet tablet Take 1 tablet by mouth Daily.   6/10/2025    amoxicillin (AMOXIL) 500 MG capsule Take 4 capsules by mouth See Admin Instructions. 4 capsules 1 hour prior to procedure 12 capsule 0      Current Meds:   [Held by provider] aspirin, 81 mg, Oral, Q12H  cefTRIAXone, 2,000 mg, Intravenous, Q24H  [Held by provider] docusate sodium, 100 mg, Oral, BID  [Held by provider] meloxicam, 15 mg, Oral, Daily  pantoprazole, 40 mg, Intravenous, BID AC  [Held by provider] polyethylene glycol, 17 g, Oral, BID  sodium chloride, 10 mL, Intravenous, Q12H  sodium chloride, 10 mL, Intravenous, Q12H  vancomycin, 1,000 mg, Intravenous, Q12H      Allergies:  Allergies   Allergen Reactions    Sulfa Antibiotics Hives     Review of Systems  Pertinent items are noted in HPI, all other systems reviewed and negative    Objective     Vital Signs  Temp:  [98.4 °F (36.9 °C)-99.1 °F (37.3 °C)] 98.4 °F (36.9 °C)  Heart Rate:  [75-95] 95  Resp:  [16-18] 16  BP: (130-132)/(50-73) 130/50    Physical Exam:  CONSTITUTIONAL:  today's vital signs reviewed  EARS NOSE THROAT: trachea  midline and no deformity of the nares  EYES: no scleral icterus  GASTROINTESTINAL: abdomen is soft, nontender, nondistended with normal active bowel sounds, no masses are appreciated  PSYCHIATRIC: appropriate mood and affect  RESPIRATORY: normal inspiratory effort with no increased work of breathing  NEUROLOGIC: patient is awake and alert  DERMATOLOGIC: skin is warm with no cyanosis  LYMPHATIC: no periumbilical lymphadenopathy     Results Review:   I reviewed the patient's new clinical results.    Results from last 7 days   Lab Units 06/14/25  1220 06/14/25  0540 06/13/25  1209 06/13/25 0418 06/12/25 0225 06/11/25  0734   WBC 10*3/mm3  --  8.19  --  9.59  --  10.60   HEMOGLOBIN g/dL 9.8* 8.5* 9.5* 8.5*   < > 10.3*   HEMATOCRIT % 29.7* 26.2* 29.2* 25.0*   < > 32.0*   PLATELETS 10*3/mm3  --  283  --  251  --  262    < > = values in this interval not displayed.     Results from last 7 days   Lab Units 06/14/25  0540 06/13/25  0418 06/12/25 0225 06/11/25  0734 06/10/25  2243   SODIUM mmol/L 140 144 138   < > 137   POTASSIUM mmol/L 4.3 4.9 4.4   < > 4.7   CHLORIDE mmol/L 106 109* 108*   < > 104   CO2 mmol/L 26.2 25.0 20.8*   < > 22.0   BUN mg/dL 13.0 15.0 13.0   < > 14.0   CREATININE mg/dL 0.54* 0.94 0.74  0.77   < > 0.71   CALCIUM mg/dL 8.2* 8.3* 8.2*   < > 9.1   BILIRUBIN mg/dL <0.2 <0.2  --   --  0.4   ALK PHOS U/L 103 107  --   --  133*   ALT (SGPT) U/L 38* 41*  --   --  37*   AST (SGOT) U/L 20 31  --   --  27   GLUCOSE mg/dL 96 97 180*   < > 107*    < > = values in this interval not displayed.         Lab Results   Lab Value Date/Time    LIPASE 20 06/10/2025 2243       Radiology:  FL Guided Aspiration Joint   Final Result   Technically successful left hip joint aspiration with removal of   approximately 12 mL of cloudy viscous yellow/orange-colored fluid.           Procedure performed by ANABELA Blake.   By electronically signing this report, I, the supervising radiologist,   attest that I was not present  for the procedure(s) but agree with the   final edited report.       This report was finalized on 6/11/2025 10:55 AM by Dr. Chandra Christie M.D on Workstation: [id number]          MRI Hip Left Without Contrast   Final Result       1. Nonspecific moderate to large left hip joint effusion with mild soft   tissue edema along the anterior and lateral proximal left femur.   Correlate with ESR and CRP and possible joint fluid analysis if there is   clinical concern for septic arthritis.   2. Full-thickness or near full-thickness tear of the left ligamentum   teres.   3. Mild edema in the left abductor muscles in the left abductor   compartment, which could represent mild muscle strain or nonspecific   myositis.           This report was finalized on 6/11/2025 7:31 AM by Oleksandr Wyatt MD on   Workstation: [id number]          CT Abdomen Pelvis With Contrast   Final Result   Addendum (preliminary) 1 of 1   Patient does have a left hip effusion. No aggressive osseous   abnormalities are seen.       This report was finalized on 6/11/2025 12:58 AM by Dr. Evelyn Carlos M.D on Workstation: [id number]          Final   1. No acute intra-abdominal or intrapelvic process seen.       2. Indeterminate solid pulmonary nodule measuring 5 mm. In a low-risk   patient with a solid nodule <6 mm, recommend no follow-up. In a   high-risk patient, CT at 12 months is optional with stronger   consideration if there is suspicious nodule morphology and/or upper lobe   location.       Radiation dose reduction techniques were utilized, including automated   exposure control and exposure modulation based on body size.           This report was finalized on 6/11/2025 12:12 AM by Dr. Evelyn Carlos M.D on Workstation: [id number]              Assessment & Plan   Active Hospital Problems    Diagnosis     **Acute hip pain, left     Septic hip     S/P patent foramen ovale closure        Assessment:  Anemia and heme positive stool  Status  post PFO closure  Septic left hip status post aspiration on antibiotics    Plan:  Would recommend outpatient EGD and colonoscopy in 2-4 weeks after discharge after she has recovered from the septic arthritis.  I see no need for emergent endoscopy in the absence of overt bleeding and relatively stable hemoglobin.   Please let us know of any change in status in regard to overt or new GI bleeding.       I discussed the patients findings and my recommendations with patient and nursing staff.    MD Deven Garcia M.D.  [facility] [place] [address]  Office: [phone]    Electronically signed by Deven Tristan MD at [phone]          Discharge Summary        Dustin Cerna MD at 25 [phone]              Patient Name: Linda Castano  : 1970  MRN: [id number]    Date of Admission: 6/10/2025  Date of Discharge:  2025  Primary Care Physician: Sintia Kemp APRN      Chief Complaint:   Hip Pain and Fever      Discharge Diagnoses     Active Hospital Problems    Diagnosis  POA    **Acute hip pain, left [M25.552]  Yes    Anemia [D64.9]  Unknown    Septic hip [M00.9]  Unknown    S/P patent foramen ovale closure [Z87.74]  Not Applicable      Resolved Hospital Problems   No resolved problems to display.        Hospital Course     54-year-old female with history of prior CVA with PFO s/p closure on aspirin, presented to the ED complaining of hospital because of worsening left hip pain.     Left hip septic arthritis  -POOL of the left hip showed Nonspecific moderate to large left hip joint effusion with mild soft  tissue edema along the anterior and lateral proximal left femur.2. Full-thickness or near full-thickness tear of the left ligamentum teres.3. Mild edema in the left abductor muscles in the left abductor compartment, which could represent mild muscle strain or nonspecific myositis.  -Status  post irrigation and debridement 06/11/2025 per orthopedic surgery.  - Blood cultures and operative cultures came back negative.  ID evaluated.  Recommended  4 weeks of antibiotics recommended with  date of 07/11/2025 at which time she will follow-up with Dr. Ellsworth in the infectious diseases clinic.  PICC line was placed  Patient will need will need weekly CBC with differential, creatinine, and CRP faxed to Dr. Ellsworth at [phone].    - Will need to follow-up with orthopedic surgery as scheduled  -     Anemia  - Hemoglobin close 11.2 on admission, gradually trended down to as low as 8.1  - No signs of active bleeding were reported  -Likely partly related secondary to blood loss anemia due to surgery however would not expect to see significant drop.  - Was initiated on IV PPI empirically, meloxicam was held, aspirin was held temporarily.  Hemoccult stool was obtained which came back positive.  GI was consulted.  - Hemoglobin remained stable between 8-9.  GI evaluated,  Recommended outpatient EGD colonoscopy in 2 to 4 weeks as hemoglobin remained stable with no active signs of bleeding.  -Patient to have weekly labs draws while on IV antibiotics, and hemoglobin to monitored with CBC     History of CVA status with PFO s/p PFO closure  -Completed Plavix, takes aspirin 81 mg daily.  - Follows with cardiology outpatient  - Continued aspirin at  discharge as  hemoglobin remained stable with no signs of active bleeding.          Right upper extremity swelling  - Developed swelling following PICC line placement.  - Stat duplex ultrasound was ordered, was negative for DVT/superficial thrombophlebitis.       At the time of discharge patient was told to take all medications as prescribed, keep all follow-up appointments, and call their doctor or return to the hospital with any worsening or concerning symptoms.           Day of Discharge     Subjective:  Patient seen this morning.  No recurrence overnight.  Reports right  upper extremity swelling resolved.  Hip pain stable.  No signs of bleeding.    Physical Exam:  Temp:  [98.2 °F (36.8 °C)-98.4 °F (36.9 °C)] 98.2 °F (36.8 °C)  Heart Rate:  [76-95] 76  Resp:  [16] 16  BP: (110-129)/(53-79) 123/70  Body mass index is 38.02 kg/m².  Physical Exam    General: Alert and oriented x3, no acute distress  HEENT: Normocephalic, atraumatic  CV: Regular rate and rhythm, no murmurs rubs or gallops  Lungs: Clear to auscultation bilaterally, no crackles or wheezes  Abdomen: Soft, nontender, nondistended  Extremities: No significant peripheral edema , left hip swollen, dressing in place, clean, intact.    Consultants     Consult Orders (all) (From admission, onward)       Start     Ordered    06/14/25 1511  Inpatient Gastroenterology Consult  Once,   Status:  Canceled        Specialty:  Gastroenterology  Provider:  Charanjit Montaño MD    06/14/25 1511    06/14/25 0906  Inpatient IV Team Consult PICC 1 Lumen  Once        Provider:  (Not yet assigned)    06/14/25 0906    06/11/25 1646  Inpatient Cardiology Consult  Once,   Status:  Canceled        Specialty:  Cardiology  Provider:  Mahin Crooks MD    06/11/25 1645    06/11/25 1024  Inpatient Infectious Diseases Consult  Once        Specialty:  Infectious Diseases  Provider:  Aravind Ellsworth DO    06/11/25 1027    06/11/25 0334  Inpatient Case Management  Consult  Once        Provider:  (Not yet assigned)    06/11/25 0334    06/11/25 0031  [facility] (on-call MD unless specified) Details  Once,   Status:  Canceled        Specialty:  Hospitalist  Provider:  (Not yet assigned)    06/11/25 0030    06/11/25 0031  Ortho (on-call MD unless specified)  Once        Specialty:  Orthopedic Surgery  Provider:  (Not yet assigned)    06/11/25 0030                  Procedures     HIP INCISION AND DRAINAGE WITH HANA TABLE      Imaging Results (All)       Procedure Component Value Units Date/Time    FL Guided Aspiration Joint [[id number]]  Collected: 06/11/25 1010     Updated: 06/11/25 1058    Narrative:      LEFT HIP FLUOROSCOPICALLY GUIDED JOINT ASPIRATION, 6/11/2025     HISTORY:  54-year-old female with left hip pain. MRI demonstrates moderate to  large left hip effusion. Rule out septic joint.     CONSENT:  Procedure, risks and alternatives were discussed in detail with the  patient, including the low risk of allergy and infection; patient  questions were answered, and informed consent was obtained. Timeout was  observed in the procedure room for patient identification and procedure  site verification, and the site was marked by me.     PROCEDURE:  *  Reference Air Kerma: 2.4 mGy     Using sterile technique, 1% lidocaine local anesthetic and fluoroscopic  guidance, a 22-gauge spinal needle was placed into the right hip joint  capsule without difficulty. Aspiration of approximately 12 mL of cloudy  viscous yellow/orange-colored fluid. The needle was removed and manual  pressure was applied achieving adequate hemostasis. A Band-Aid was  applied. Specimen was sent to laboratory as ordered by the requesting  provider. The patient tolerated the procedure well without incident.          Impression:      Technically successful left hip joint aspiration with removal of  approximately 12 mL of cloudy viscous yellow/orange-colored fluid.        Procedure performed by ANABELA Blake.  By electronically signing this report, I, the supervising radiologist,  attest that I was not present for the procedure(s) but agree with the  final edited report.     This report was finalized on 6/11/2025 10:55 AM by Dr. Chandra Christie M.D on Workstation: [id number]       MRI Hip Left Without Contrast [[id number]] Collected: 06/11/25 0720     Updated: 06/11/25 0734    Narrative:      MRI HIP LEFT WO CONTRAST-     6/11/2025 4:00 AM     INDICATION: acute left hip pain, suspect effusion on CT, possible septic  arthritis.     COMPARISON: CT abdomen pelvis 6/10/2025.      TECHNIQUE: Multiplanar, multisequence MR imaging of the pelvis and hip  was performed without the intravenous administration of contrast.     FINDINGS:  SOFT TISSUES: Mild patchy soft tissue edema anterior and lateral to the  proximal left femur and in the left adductor compartment. No cystic or  solid soft tissue mass. No abnormal bursal fluid collection. No  pathologically enlarged lymph nodes. No well-formed or drainable fluid  collection to suggest abscess. The sciatic nerves are unremarkable as  imaged.     BONES: Tiny likely degenerative subchondral cystic changes at the  anterior/superior left acetabulum. No acute fracture. No concerning bone  marrow lesion or marrow replacing process.     JOINTS: Moderate to large left hip joint effusion. Mild left hip joint  chondromalacia with full-thickness or near full-thickness chondral  fissuring at the anterior/superior left acetabulum. The articular  cartilage in the contralateral (right) hip joint space is fairly well  maintained on the wide field-of-view sequences. Full-thickness or near  full-thickness tear of the left ligamentum teres. Mild DJD at the pubic  symphysis. Mild bilateral SI joint DJD. Partially imaged lumbosacral  spondylosis.     LABRUM: Likely degenerative tear of the anterior/superior left  acetabular labrum. Tiny 0.5 cm associated anterior/superior left  paralabral cyst.     MUSCLES AND TENDONS: Mild edema throughout the left adductor muscles and  in the left tensor fascia ana muscle. The anterior muscles and tendons  are otherwise intact. The gluteal tendons are intact. Significant  muscular fatty atrophy. The proximal hamstring tendons are intact.       Impression:         1. Nonspecific moderate to large left hip joint effusion with mild soft  tissue edema along the anterior and lateral proximal left femur.  Correlate with ESR and CRP and possible joint fluid analysis if there is  clinical concern for septic arthritis.  2. Full-thickness or  near full-thickness tear of the left ligamentum  teres.  3. Mild edema in the left abductor muscles in the left abductor  compartment, which could represent mild muscle strain or nonspecific  myositis.        This report was finalized on 6/11/2025 7:31 AM by Oleksandr Wyatt MD on  Workstation: [id number]       CT Abdomen Pelvis With Contrast [[id number]] Collected: 06/11/25 0006     Updated: 06/11/25 0102    Addenda:        Patient does have a left hip effusion. No aggressive osseous  abnormalities are seen.     This report was finalized on 6/11/2025 12:58 AM by Dr. Evelyn Carlos M.D on Workstation: [id number]     Signed: 06/11/25 0058 by Evelyn Carlos MD    Narrative:      CT OF THE ABDOMEN PELVIS WITH CONTRAST     HISTORY: Left flank pain and fever     COMPARISON: None available.     TECHNIQUE: Axial CT imaging was obtained through the abdomen and pelvis.  IV contrast was administered.     FINDINGS:  The patient is status post closure of a patent foramen ovale. Images  through the lung bases are degraded by motion artifact. There is a  potential nodule within the left lower lobe, measuring 5 mm. No  suspicious hepatic lesions are seen. The stomach, adrenal glands,  spleen, pancreas, and gallbladder all appear normal there is a duodenal  diverticulum.. The kidneys enhance symmetrically. There is no  hydronephrosis. There are bilateral parapelvic cysts. Uterus appears to  normal. No adnexal masses are seen. No distal ureteral or bladder stones  are seen. There is colonic diverticulosis. There is no bowel  obstruction. The appendix is normal. No acute osseous abnormalities are  seen.       Impression:      1. No acute intra-abdominal or intrapelvic process seen.     2. Indeterminate solid pulmonary nodule measuring 5 mm. In a low-risk  patient with a solid nodule <6 mm, recommend no follow-up. In a  high-risk patient, CT at 12 months is optional with stronger  consideration if there is suspicious nodule  morphology and/or upper lobe  location.     Radiation dose reduction techniques were utilized, including automated  exposure control and exposure modulation based on body size.        This report was finalized on 6/11/2025 12:12 AM by Dr. Evelyn Carlos M.D on Workstation: [id number]             Results for orders placed during the hospital encounter of 06/10/25    Duplex Venous Upper Extremity - Right CAR    Interpretation Summary    Normal right upper extremity venous duplex scan.    Results for orders placed during the hospital encounter of 02/19/25    Adult Transthoracic Echo Limited W/ Cont if Necessary Per Protocol    Interpretation Summary    Left ventricular systolic function is normal. Calculated left ventricular EF = 64.4%    Normal left atrial size and volume noted. Saline test results are negative for right to left atrial level shunt. ASD or PFO closure device in interatrial septum.    Pertinent Labs     Results from last 7 days   Lab Units 06/16/25  0743 06/15/25  0548 06/14/25  1220 06/14/25  0540 06/13/25  1209 06/13/25  0418   WBC 10*3/mm3 7.35 8.32  --  8.19  --  9.59   HEMOGLOBIN g/dL 9.1* 8.6* 9.8* 8.5*   < > 8.5*   PLATELETS 10*3/mm3 308 280  --  283  --  251    < > = values in this interval not displayed.     Results from last 7 days   Lab Units 06/16/25  0743 06/15/25  0548 06/14/25  0540 06/13/25  0418   SODIUM mmol/L 142 143 140 144   POTASSIUM mmol/L 4.6 4.4 4.3 4.9   CHLORIDE mmol/L 108* 109* 106 109*   CO2 mmol/L 27.1 25.0 26.2 25.0   BUN mg/dL 13.0 12.0 13.0 15.0   CREATININE mg/dL 0.74 0.67 0.54* 0.94   GLUCOSE mg/dL 85 95 96 97   Estimated Creatinine Clearance: 93 mL/min (by C-G formula based on SCr of 0.74 mg/dL).  Results from last 7 days   Lab Units 06/16/25  0743 06/15/25  0548 06/14/25  0540 06/13/25  0418   ALBUMIN g/dL 3.0* 3.1* 3.0* 2.9*   BILIRUBIN mg/dL <0.2 <0.2 <0.2 <0.2   ALK PHOS U/L 115 119* 103 107   AST (SGOT) U/L 24 24 20 31   ALT (SGPT) U/L 40* 36* 38* 41*  "    Results from last 7 days   Lab Units 06/16/25  0743 06/15/25  0548 06/14/25  0540 06/13/25  0418   CALCIUM mg/dL 8.7 8.2* 8.2* 8.3*   ALBUMIN g/dL 3.0* 3.1* 3.0* 2.9*   MAGNESIUM mg/dL  --   --  2.0  --    PHOSPHORUS mg/dL  --   --  4.0  --      Results from last 7 days   Lab Units 06/10/25  2243   LIPASE U/L 20             Invalid input(s): \"LDLCALC\"  Results from last 7 days   Lab Units 06/11/25  1549 06/11/25  1547 06/11/25  0856 06/10/25  2359 06/10/25  2353   BLOODCX   --   --   --  No growth at 5 days No growth at 5 days   BODYFLDCX   --   --  No growth at 5 days  --   --    WOUNDCX  No growth at 3 days No growth at 3 days  --   --   --          Test Results Pending at Discharge       Discharge Details        Discharge Medications        New Medications        Instructions Start Date   Acetaminophen Extra Strength 500 MG tablet  Commonly known as: TYLENOL   Take 2 tablets by mouth Every 8 (Eight) Hours As Needed for Moderate Pain or Mild Pain.      cefTRIAXone 2,000 mg in sodium chloride 0.9 % 100 mL IVPB   2,000 mg, Intravenous, Every 24 Hours      HYDROcodone-acetaminophen 5-325 MG per tablet  Commonly known as: NORCO   Take 1 tablet by mouth Every 6 (Six) Hours As Needed for Mild Pain.      Lactobacillus tablet   1 tablet, Oral, Daily      pantoprazole 40 MG EC tablet  Commonly known as: PROTONIX   40 mg, Oral, Daily      Senexon-S 8.6-50 MG per tablet  Generic drug: sennosides-docusate   Take 2 tablets by mouth Daily As Needed for Constipation.      vancomycin 1250 mg in sodium chloride 0.9% 250 mL (CD)   1,250 mg, Intravenous, Every 12 Hours             Continue These Medications        Instructions Start Date   Aspirin Low Dose 81 MG EC tablet  Generic drug: aspirin   81 mg, Oral, Daily      [employer] TRIPLE MAGNESIUM COMPLEX PO   300 mg, Nightly      multivitamin with minerals tablet tablet   1 tablet, Daily             Stop These Medications      amoxicillin 500 MG capsule  Commonly known as: AMOXIL   "   clopidogrel 75 MG tablet  Commonly known as: PLAVIX              Allergies   Allergen Reactions    Sulfa Antibiotics Hives       Discharge Disposition:  Home-Health Care Svc      Discharge Diet:  No active diet order      Discharge Activity:       CODE STATUS:    Code Status and Medical Interventions: CPR (Attempt to Resuscitate); Full Support   Ordered at: 06/11/25 0120     Code Status (Patient has no pulse and is not breathing):    CPR (Attempt to Resuscitate)     Medical Interventions (Patient has pulse or is breathing):    Full Support       Future Appointments   Date Time Provider Department Center   7/11/2025 10:50 AM Aravind Ellsworth,  [facility]   8/20/2025 11:00 AM Shadi Anguiano MD [id number]   1/14/2026  2:00 PM Rossana Pryor APRN [facility]   2/23/2026 12:20 PM Mahin Crooks MD [id number]     Additional Instructions for the Follow-ups that You Need to Schedule       Ambulatory Referral to Home Health   As directed      Notify the office if dressing becomes dislodged or saturated. Do not change unless instructed by provider    Order Comments: Notify the office if dressing becomes dislodged or saturated. Do not change unless instructed by provider    Face to Face Visit Date: 6/13/2025   Follow-up provider for Plan of Care?: I will be treating the patient on an ongoing basis.  Please send me the Plan of Care for signature.   Follow-up provider: BOBBY OLIVARES [[id number]]   Reason/Clinical Findings: Postsurgical   Describe mobility limitations that make leaving home difficult: Patient requires the assistance of another to leave home   Nursing/Therapeutic Services Requested: Physical Therapy   PT orders: Total joint pathway   Frequency: 1 Week 1        Ambulatory Referral to Home Health   As directed      Patient will need weekly CBC with differential, creatinine, and CRP faxed to Dr. Ellsworth at [phone].    Order Comments: Patient will need weekly CBC with  differential, creatinine, and CRP faxed to Dr. Ellsworth at [phone].    Face to Face Visit Date: 6/14/2025   Follow-up provider for Plan of Care?: I treated the patient in an acute care facility and will not continue treatment after discharge.   Follow-up provider: SINTIA KEMP [[id number]]   Reason/Clinical Findings: Left hip culture native septic arthritis   Describe mobility limitations that make leaving home difficult: Decreased mobility secondary to left hip pain, septic arthritis.   Nursing/Therapeutic Services Requested: Physical Therapy Skilled Nursing   Skilled nursing orders: Infusion therapy (Lab draws) Wound care dressing/changes   PT orders: Therapeutic exercise Gait Training Transfer training Strengthening   Weight Bearing Status: As Tolerated   Frequency: 1 Week 1        Discharge Follow-up with Specialty: Orthopedics; 2 Weeks   As directed      Specialty: Orthopedics   Follow Up: 2 Weeks   Follow Up Details: Return to the office to see Dr. Ammon Aly               Contact information for follow-up providers       Sintia Kemp, APRN. Schedule an appointment as soon as possible for a visit in 1 week(s).    Specialties: Family Medicine, Nurse Practitioner  Contact information:  [address]   [facility] [address]  [place] [address]  [phone]               Ammon Aly MD. Schedule an appointment as soon as possible for a visit in 1 week(s).    Specialty: Orthopedic Surgery  Contact information:  [address]  [place] [address]  [phone]               Aravind Ellsworth,  Follow up on 7/11/2025.    Specialty: Infectious Diseases  Contact information:  [address]  [place] [id number] [phone]               Deven Tristan MD. Schedule an appointment as soon as possible for a visit in 1 month(s).    Specialty: Gastroenterology  Contact information:  [address] [facility]  [address]  [place] [id number] [phone]                        Contact information for after-discharge care       Durable Medical Equipment       [facility] .    Service: Durable Medical Equipment  Contact information:  [address]  [place] [address]  [phone]                                   Additional Instructions for the Follow-ups that You Need to Schedule       Ambulatory Referral to Home Health   As directed      Notify the office if dressing becomes dislodged or saturated. Do not change unless instructed by provider    Order Comments: Notify the office if dressing becomes dislodged or saturated. Do not change unless instructed by provider    Face to Face Visit Date: 6/13/2025   Follow-up provider for Plan of Care?: I will be treating the patient on an ongoing basis.  Please send me the Plan of Care for signature.   Follow-up provider: BOBBY OLIVARES [[id number]]   Reason/Clinical Findings: Postsurgical   Describe mobility limitations that make leaving home difficult: Patient requires the assistance of another to leave home   Nursing/Therapeutic Services Requested: Physical Therapy   PT orders: Total joint pathway   Frequency: 1 Week 1        Ambulatory Referral to Home Health   As directed      Patient will need weekly CBC with differential, creatinine, and CRP faxed to Dr. Ellsworth at [phone].    Order Comments: Patient will need weekly CBC with differential, creatinine, and CRP faxed to Dr. Ellsworth at [phone].    Face to Face Visit Date: 6/14/2025   Follow-up provider for Plan of Care?: I treated the patient in an acute care facility and will not continue treatment after discharge.   Follow-up provider: GARRICK LARKIN [[id number]]   Reason/Clinical Findings: Left hip culture native septic arthritis   Describe mobility limitations that make leaving home difficult: Decreased mobility secondary to left hip pain, septic arthritis.   Nursing/Therapeutic Services Requested: Physical Therapy Skilled Nursing   Skilled nursing  orders: Infusion therapy (Lab draws) Wound care dressing/changes   PT orders: Therapeutic exercise Gait Training Transfer training Strengthening   Weight Bearing Status: As Tolerated   Frequency: 1 Week 1        Discharge Follow-up with Specialty: Orthopedics; 2 Weeks   As directed      Specialty: Orthopedics   Follow Up: 2 Weeks   Follow Up Details: Return to the office to see Dr. Ammon Aly            Time Spent on Discharge:  Greater than 30 minutes      Dustin Cerna MD  [facility]  06/16/25  18:23 EDT                Electronically signed by Dustin Cerna MD at 06/16/25 2629

## 2025-06-20 ENCOUNTER — READMISSION MANAGEMENT (OUTPATIENT)
Dept: CALL CENTER | Facility: HOSPITAL | Age: 55
End: 2025-06-20
Payer: COMMERCIAL

## 2025-06-20 NOTE — OUTREACH NOTE
General Surgery Week 1 Survey      Flowsheet Row Responses   [facility] patient discharged from? [place]   Does the patient have one of the following disease processes/diagnoses(primary or secondary)? General Surgery   Week 1 attempt successful? Yes   Call start time 1624   Call end time 1629   Discharge diagnosis Acute hip pain, left,  HIP INCISION AND DRAINAGE   Meds reviewed with patient/caregiver? Yes   Is the patient having any side effects they believe may be caused by any medication additions or changes? No   Does the patient have all medications related to this admission filled (includes all antibiotics, pain medications, etc.) Yes   Is the patient taking all medications as directed (includes completed medication regime)? Yes   Does the patient have a follow up appointment scheduled with their surgeon? Yes   Has the patient kept scheduled appointments due by today? Yes   Comments Surgeon on Tues   What is the Home health agency?  [facility]   Has home health visited the patient within 72 hours of discharge? Yes   What DME was ordered? walker   Has all DME been delivered? Yes   Psychosocial issues? No   Did the patient receive a copy of their discharge instructions? Yes   Nursing interventions Reviewed instructions with patient   What is the patient's perception of their health status since discharge? Improving   Is the patient /caregiver able to teach back basic post-op care? Continue use of incentive spirometry at least 1 week post discharge, Practice 'cough and deep breath', No tub bath, swimming, or hot tub until instructed by MD, Keep incision areas clean,dry and protected   Is the patient/caregiver able to teach back signs and symptoms of incisional infection? Increased redness, swelling or pain at the incisonal site, Increased drainage or bleeding   Is the patient/caregiver able to teach back steps to recovery at home? Set small,  achievable goals for return to baseline health, Rest and rebuild strength, gradually increase activity, Eat a well-balance diet   If the patient is a current smoker, are they able to teach back resources for cessation? Not a smoker   Is the patient/caregiver able to teach back the hierarchy of who to call/visit for symptoms/problems? PCP, Specialist, Home health nurse, Urgent Care, ED, 911 Yes   Additional teach back comments States she is doing well.  Has some swelling to ankle and keeping elevated and ice which seems to be helping. If it continues or worsen's she will contact surgeon.   Week 1 call completed? Yes   Wrap up additional comments If swelling to ankle worsens or continues will notify the surgeon   Call end time [id number]            Monica LANE - Licensed Nurse

## 2025-06-24 ENCOUNTER — OFFICE VISIT (OUTPATIENT)
Dept: ORTHOPEDIC SURGERY | Facility: CLINIC | Age: 55
End: 2025-06-24
Payer: COMMERCIAL

## 2025-06-24 VITALS — TEMPERATURE: 98.3 F | BODY MASS INDEX: 37.94 KG/M2 | WEIGHT: 206.2 LBS | HEIGHT: 62 IN

## 2025-06-24 DIAGNOSIS — M00.9 JOINT INFECTION: Primary | ICD-10-CM

## 2025-06-24 PROCEDURE — 99024 POSTOP FOLLOW-UP VISIT: CPT | Performed by: ORTHOPAEDIC SURGERY

## 2025-06-24 RX ORDER — HYDROCODONE BITARTRATE AND ACETAMINOPHEN 5; 325 MG/1; MG/1
1 TABLET ORAL EVERY 6 HOURS PRN
COMMUNITY

## 2025-06-24 NOTE — PROGRESS NOTES
Linda Castano : 1970 MRN: [id number] DATE: 2025    DIAGNOSIS: 2 week follow up irrigation debridement.    SUBJECTIVE:Patient returns today for 2 week follow up of left hip irrigation debridement.  Still on antibiotics per infectious disease recent lab work done yesterday.    OBJECTIVE:   Exam:. The incision is healing appropriately. No sign of infection. Range of motion is progressing as expected. The calf is soft and nontender with a negative Homans sign.    Recent lab work reviewed white count normal.  CRP minimally elevated 0.7 down from almost 6.        ASSESSMENT: 2 week status post left hip irrigation debridement.    PLAN: 1) Dressing removed and steri strips applied   2) patient okay wean off walking aid has a daughter in the therapy world is given her some instruction she can call if she wishes to do outpatient therapy.   3) Continue ice PRN.  Antibiotics per infectious disease.   4) WBAT   5) home aspirin   6) Follow up in 4 weeks     Ammon Aly MD  2025   Answers submitted by the patient for this visit:  Post Operative Visit (Submitted on 2025)  Chief Complaint: Follow-up  Pain Control: well controlled  Fever: no fever  Diet: adequate intake  Activity: impaired due to weakness  Operative Site Issues: No

## 2025-06-30 ENCOUNTER — READMISSION MANAGEMENT (OUTPATIENT)
Dept: CALL CENTER | Facility: HOSPITAL | Age: 55
End: 2025-06-30
Payer: COMMERCIAL

## 2025-06-30 NOTE — OUTREACH NOTE
General Surgery Week 1 Survey      Flowsheet Row Responses   [facility] patient discharged from? [place]   Does the patient have one of the following disease processes/diagnoses(primary or secondary)? General Surgery   Call start time 0949   Call end time 0954   Discharge diagnosis Acute hip pain, left,  HIP INCISION AND DRAINAGE   Meds reviewed with patient/caregiver? Yes   Is the patient taking all medications as directed (includes completed medication regime)? Yes   Does the patient have a follow up appointment scheduled with their surgeon? Yes   Has the patient kept scheduled appointments due by today? Yes   Home health comments Pt goes to [facility] on Mondays for picc care.   Psychosocial issues? No   Did the patient receive a copy of their discharge instructions? Yes   Nursing interventions Reviewed instructions with patient   What is the patient's perception of their health status since discharge? Improving   Nursing interventions Nurse provided patient education   Is the patient/caregiver able to teach back signs and symptoms of incisional infection? Fever, Pus or odor from incision, Incisional warmth, Increased drainage or bleeding, Increased redness, swelling or pain at the incisonal site   Is the patient/caregiver able to teach back steps to recovery at home? Set small, achievable goals for return to baseline health   Is the patient/caregiver able to teach back the hierarchy of who to call/visit for symptoms/problems? PCP, Specialist, Home health nurse, Urgent Care, ED, 911 Yes   Graduated Yes   Graduated/Revoked comments Pt reports she is doing very well.   Call end time 0954            JC ANGELES - Registered Nurse

## 2025-07-11 ENCOUNTER — OFFICE VISIT (OUTPATIENT)
Dept: INFECTIOUS DISEASES | Facility: CLINIC | Age: 55
End: 2025-07-11
Payer: COMMERCIAL

## 2025-07-11 VITALS
HEIGHT: 62 IN | BODY MASS INDEX: 39.05 KG/M2 | HEART RATE: 80 BPM | WEIGHT: 212.2 LBS | SYSTOLIC BLOOD PRESSURE: 140 MMHG | RESPIRATION RATE: 16 BRPM | DIASTOLIC BLOOD PRESSURE: 88 MMHG | TEMPERATURE: 97.1 F

## 2025-07-11 DIAGNOSIS — M00.9 SEPTIC HIP: Primary | ICD-10-CM

## 2025-07-11 RX ORDER — CEFADROXIL 500 MG/1
500 CAPSULE ORAL 2 TIMES DAILY
Qty: 28 CAPSULE | Refills: 0 | Status: SHIPPED | OUTPATIENT
Start: 2025-07-11 | End: 2025-07-25

## 2025-07-11 NOTE — PROGRESS NOTES
"Chief Complaint  Follow-up    Subjective        Linda Castano presents to [facility] INFECTIOUS DISEASES  History of Present Illness    Patient is a 54 y.o. female here today for follow-up on recent admission in June for left hip culture-negative septic arthritis.  Patient seen by ID at that time with orthopedic evaluation status post debridement of the left hip on 6/11/2025.  Operative cultures were negative and patient was placed on empiric vancomycin and ceftriaxone for 4 weeks through stop date of 7/11/2025.  Patient is here today for follow-up.    Patient reports that she has done well with her PICC line.  Denies any side effects from the antibiotics.  States she has been taking the medication without difficulty.  Does report that her pain of the hip initially improved but the last couple days it is continued to worsen.  States that it is not sharp but a more nagging pain.  States she has been more active over the last couple days.  Denies any fevers or chills.    Objective   Vital Signs:  /88   Pulse 80   Temp 97.1 °F (36.2 °C)   Resp 16   Ht 157.5 cm (62\")   Wt 96.3 kg (212 lb 3.2 oz)   BMI 38.81 kg/m²   Estimated body mass index is 38.81 kg/m² as calculated from the following:    Height as of this encounter: 157.5 cm (62\").    Weight as of this encounter: 96.3 kg (212 lb 3.2 oz).    Physical Exam  Constitutional:       General: She is not in acute distress.     Appearance: Normal appearance. She is normal weight. She is not ill-appearing.   HENT:      Head: Normocephalic and atraumatic.      Nose: Nose normal. No rhinorrhea.      Mouth/Throat:      Mouth: Mucous membranes are moist.      Pharynx: No oropharyngeal exudate.   Eyes:      General: No scleral icterus.     Extraocular Movements: Extraocular movements intact.      Pupils: Pupils are equal, round, and reactive to light.   Cardiovascular:      Rate and Rhythm: Normal rate.      Pulses: Normal pulses.   Pulmonary:      " Effort: Pulmonary effort is normal. No respiratory distress.   Abdominal:      General: Abdomen is flat.      Palpations: Abdomen is soft.   Musculoskeletal:         General: No swelling or tenderness. Normal range of motion.      Cervical back: Normal range of motion and neck supple.      Right lower leg: No edema.      Left lower leg: No edema.   Skin:     General: Skin is warm and dry.      Findings: No rash.   Neurological:      General: No focal deficit present.      Mental Status: She is alert and oriented to person, place, and time.   Psychiatric:         Mood and Affect: Mood normal.         Behavior: Behavior normal.        Result Review :  The following data was reviewed by: Aravind Ellsworth DO on 07/11/2025:  Common labs          6/14/2025    05:40 6/14/2025    12:20 6/15/2025    05:48 6/16/2025    07:43   Common Labs   Glucose 96   95  85    BUN 13.0   12.0  13.0    Creatinine 0.54   0.67  0.74    Sodium 140   143  142    Potassium 4.3   4.4  4.6    Chloride 106   109  108    Calcium 8.2   8.2  8.7    Albumin 3.0   3.1  3.0    Total Bilirubin <0.2   <0.2  <0.2    Alkaline Phosphatase 103   119  115    AST (SGOT) 20   24  24    ALT (SGPT) 38   36  40    WBC 8.19   8.32  7.35    Hemoglobin 8.5  9.8  8.6  9.1    Hematocrit 26.2  29.7  25.9  28.7    Platelets 283   280  308      Data reviewed : Antibiotic monitoring labs, microbiology and operative note          Assessment and Plan   Diagnoses and all orders for this visit:    1. Septic hip (Primary)    Other orders  -     cefadroxil (DURICEF) 500 MG capsule; Take 1 capsule by mouth 2 (Two) Times a Day for 14 days.  Dispense: 28 capsule; Refill: 0    Patient is now status post 4 weeks of empiric antibiotic coverage with vancomycin and ceftriaxone for left native hip septic arthritis.  CRP is returned to normal.  We discussed her ongoing pain and the fact that this may be related to her increased activity.  We discussed that her inflammatory markers have  shown vast improvement and likely exhibits good response to therapy.  We discussed a further consolidative course of antibiotics for 2 weeks given she has had some increased pain over the last couple days.  Will plan to complete out 2 weeks of cefadroxil 500 mg twice daily and this will total 6 weeks of antibiotics.  Plan to remove her PICC line and stop IV therapy today.    She was counseled on symptoms to monitor for and follow-up with orthopedics.  Infectious disease will remain available as needed.       I spent 49 minutes caring for Linda on this date of service. This time includes time spent by me in the following activities:preparing for the visit, reviewing tests, obtaining and/or reviewing a separately obtained history, performing a medically appropriate examination and/or evaluation , counseling and educating the patient/family/caregiver, ordering medications, tests, or procedures, documenting information in the medical record, independently interpreting results and communicating that information with the patient/family/caregiver, and care coordination  Follow Up   No follow-ups on file.  Patient was given instructions and counseling regarding her condition or for health maintenance advice. Please see specific information pulled into the AVS if appropriate.

## 2025-07-15 ENCOUNTER — OFFICE VISIT (OUTPATIENT)
Dept: ORTHOPEDIC SURGERY | Facility: CLINIC | Age: 55
End: 2025-07-15
Payer: COMMERCIAL

## 2025-07-15 VITALS — WEIGHT: 210.6 LBS | BODY MASS INDEX: 38.76 KG/M2 | HEIGHT: 62 IN | TEMPERATURE: 98.4 F

## 2025-07-15 DIAGNOSIS — M00.9 JOINT INFECTION: Primary | ICD-10-CM

## 2025-07-15 NOTE — PROGRESS NOTES
"Patient: Linda Castano  YOB: 1970 54 y.o. female  Medical Record Number: [id number]    Chief Complaint:   Chief Complaint   Patient presents with    Left Hip - Follow-up       History of Present Illness:Linda Castano is a 54 y.o. female who presents for follow-up of left hip irrigation debridement.  Patient states she is overall doing well past few days does report even prior to all of this she had some hip issues.    Allergies:   Allergies   Allergen Reactions    Sulfa Antibiotics Hives       Medications:   Current Outpatient Medications   Medication Sig Dispense Refill    aspirin 81 MG EC tablet Take 1 tablet by mouth Daily. Indications: Prevention of Unwanted Clot in Veins, VTE Prophylaxis 30 tablet 0    cefadroxil (DURICEF) 500 MG capsule Take 1 capsule by mouth 2 (Two) Times a Day for 14 days. 28 capsule 0    [employer] TRIPLE MAGNESIUM COMPLEX PO Take 300 mg by mouth Every Night.      HYDROcodone-acetaminophen (NORCO) 5-325 MG per tablet Take 1 tablet by mouth Every 6 (Six) Hours As Needed for Moderate Pain.      multivitamin with minerals tablet tablet Take 1 tablet by mouth Daily.       No current facility-administered medications for this visit.         The following portions of the patient's history were reviewed and updated as appropriate: allergies, current medications, past family history, past medical history, past social history, past surgical history and problem list.    Review of Systems:   A 14-point review of systems was performed. All systems negative except pertinent positives/negative listed in HPI above    Physical Exam:   Vitals:    07/15/25 0847   Temp: 98.4 °F (36.9 °C)   TempSrc: Temporal   Weight: 95.5 kg (210 lb 9.6 oz)   Height: 157.5 cm (62\")   PainSc: 10-Worst pain ever   PainLoc: Hip       General: A and O x 3, ASA, NAD    SCLERAE:    Normal    DENTITION:   Normal  Left hip range of motion tolerated.  Incision healing well.        Assessment/Plan:  4 weeks status post " left hip irrigation debridement    Patient seen infectious disease she is now on oral medication they state labs have returned to normal.  She is doing quite well.  Told her to ease into some activities here in a few weeks no pools or tubs for at least other month.  Plan to see her back in 4 weeks if needed otherwise can see her in 1 year.  All questions answered patient or stands agrees    Disclaimer: Please note that areas of this note were completed with computer voice recognition software.  Quite often unanticipated grammatical, syntax, homophones, and other interpretive errors are inadvertently transcribed by the computer software. Please excuse any errors that have escaped final proofreading.    MD Ammon De Souza MD  7/15/2025

## 2025-08-20 ENCOUNTER — OFFICE VISIT (OUTPATIENT)
Dept: CARDIOLOGY | Age: 55
End: 2025-08-20
Payer: COMMERCIAL

## 2025-08-20 VITALS
BODY MASS INDEX: 40.23 KG/M2 | SYSTOLIC BLOOD PRESSURE: 140 MMHG | DIASTOLIC BLOOD PRESSURE: 90 MMHG | HEART RATE: 69 BPM | WEIGHT: 218.6 LBS | HEIGHT: 62 IN

## 2025-08-20 DIAGNOSIS — Z87.74 S/P PATENT FORAMEN OVALE CLOSURE: ICD-10-CM

## 2025-08-20 DIAGNOSIS — Q21.12 PFO WITH ATRIAL SEPTAL ANEURYSM: ICD-10-CM

## 2025-08-20 DIAGNOSIS — Z95.818 IMPLANTABLE LOOP RECORDER PRESENT: ICD-10-CM

## 2025-08-20 DIAGNOSIS — Z29.89 NEED FOR SBE (SUBACUTE BACTERIAL ENDOCARDITIS) PROPHYLAXIS: ICD-10-CM

## 2025-08-20 DIAGNOSIS — I25.3 PFO WITH ATRIAL SEPTAL ANEURYSM: ICD-10-CM

## 2025-08-20 DIAGNOSIS — I63.9 ACUTE CVA (CEREBROVASCULAR ACCIDENT): Primary | ICD-10-CM

## 2025-08-20 PROBLEM — M25.552 ACUTE HIP PAIN, LEFT: Status: RESOLVED | Noted: 2025-06-11 | Resolved: 2025-08-20

## 2025-08-20 PROBLEM — M00.9 SEPTIC HIP: Status: RESOLVED | Noted: 2025-06-10 | Resolved: 2025-08-20

## 2025-08-20 LAB
MDC_IDC_PG_IMPLANT_DTM: NORMAL
MDC_IDC_PG_MFG: NORMAL
MDC_IDC_PG_MODEL: NORMAL
MDC_IDC_PG_SERIAL: NORMAL
MDC_IDC_PG_TYPE: NORMAL
MDC_IDC_SESS_DTM: NORMAL
MDC_IDC_SESS_TYPE: NORMAL

## (undated) DEVICE — PERCLOSE™ PROSTYLE™ SUTURE-MEDIATED CLOSURE AND REPAIR SYSTEM: Brand: PERCLOSE™ PROSTYLE™

## (undated) DEVICE — KT DRN EVAC WND PVC PCH WTROC RND 10F400

## (undated) DEVICE — PINNACLE INTRODUCER SHEATH: Brand: PINNACLE

## (undated) DEVICE — CULT AER/ANAEROB FASTIDIOUS BACT

## (undated) DEVICE — PREP SOL POVIDONE/IODINE BT 4OZ

## (undated) DEVICE — TRAP FLD MINIVAC MEGADYNE 100ML

## (undated) DEVICE — HANDPIECE SET WITH COAXIAL HIGH FLOW TIP AND SUCTION TUBE: Brand: INTERPULSE

## (undated) DEVICE — WEREWOLF FASTSEAL 6.0 HEMOSTASIS WAND: Brand: FASTSEAL 6.0 HEMOSTASIS WAND

## (undated) DEVICE — LOU LOOP RECORDER PACK: Brand: MEDLINE INDUSTRIES, INC.

## (undated) DEVICE — GLV SURG SENSICARE PI LF PF 8 GRN STRL

## (undated) DEVICE — Device

## (undated) DEVICE — 450 ML BOTTLE OF 0.05% CHLORHEXIDINE GLUCONATE IN 99.95% STERILE WATER FOR IRRIGATION, USP AND APPLICATOR.: Brand: IRRISEPT ANTIMICROBIAL WOUND LAVAGE

## (undated) DEVICE — SUT PDS 1 CT1 36IN Z347H

## (undated) DEVICE — PENCL SMOKE/EVAC MEGADYNE TELESCP 10FT

## (undated) DEVICE — SYS DEL TREVISIO 45D 9F 80CM

## (undated) DEVICE — DRSNG BURN ACTICOAT FLEX 7 1X24IN

## (undated) DEVICE — SUT VIC 1 CT1 36IN J947H

## (undated) DEVICE — GLV SURG BIOGEL LTX PF 8

## (undated) DEVICE — CATH ULTRASND ECHO ACUNAV FOR ACUSON 8F 90CM

## (undated) DEVICE — PICO 7 DUAL 15X20CM: Brand: PICO™ 7

## (undated) DEVICE — COAXIAL HIGH FLOW TIP WITH SOFT SHIELD

## (undated) DEVICE — SPONGE,LAP,18"X18",DLX,XR,ST,5/PK,40/PK: Brand: MEDLINE

## (undated) DEVICE — APPL DURAPREP IODOPHOR APL 26ML

## (undated) DEVICE — DGW .035 FC J3MM 260CM TEF: Brand: EMERALD

## (undated) DEVICE — SKIN PREP TRAY 4 COMPARTM TRAY: Brand: MEDLINE INDUSTRIES, INC.

## (undated) DEVICE — PK ANT HIP 40

## (undated) DEVICE — ANTIBACTERIAL UNDYED BRAIDED (POLYGLACTIN 910), SYNTHETIC ABSORBABLE SUTURE: Brand: COATED VICRYL

## (undated) DEVICE — CATH DIAG IMPULSE W/SH MPA2 6F125CM

## (undated) DEVICE — GW AMPLTZ SUPERSTIFF SHT/TPR STR .035IN 260CM

## (undated) DEVICE — MAT FLR ABS LIQUILOC 28X56IN PNK